# Patient Record
Sex: FEMALE | Race: WHITE | NOT HISPANIC OR LATINO
[De-identification: names, ages, dates, MRNs, and addresses within clinical notes are randomized per-mention and may not be internally consistent; named-entity substitution may affect disease eponyms.]

---

## 2017-01-04 ENCOUNTER — APPOINTMENT (OUTPATIENT)
Dept: PLASTIC SURGERY | Facility: CLINIC | Age: 67
End: 2017-01-04

## 2017-01-07 ENCOUNTER — EMERGENCY (EMERGENCY)
Facility: HOSPITAL | Age: 67
LOS: 1 days | Discharge: HOME | End: 2017-01-07
Attending: EMERGENCY MEDICINE | Admitting: EMERGENCY MEDICINE
Payer: MEDICARE

## 2017-01-07 VITALS
HEART RATE: 84 BPM | SYSTOLIC BLOOD PRESSURE: 144 MMHG | RESPIRATION RATE: 18 BRPM | DIASTOLIC BLOOD PRESSURE: 73 MMHG | TEMPERATURE: 98 F | OXYGEN SATURATION: 99 %

## 2017-01-07 VITALS — HEART RATE: 84 BPM | RESPIRATION RATE: 18 BRPM | SYSTOLIC BLOOD PRESSURE: 143 MMHG | DIASTOLIC BLOOD PRESSURE: 83 MMHG

## 2017-01-07 DIAGNOSIS — Z90.710 ACQUIRED ABSENCE OF BOTH CERVIX AND UTERUS: Chronic | ICD-10-CM

## 2017-01-07 DIAGNOSIS — Z90.49 ACQUIRED ABSENCE OF OTHER SPECIFIED PARTS OF DIGESTIVE TRACT: Chronic | ICD-10-CM

## 2017-01-07 DIAGNOSIS — Z93.3 COLOSTOMY STATUS: Chronic | ICD-10-CM

## 2017-01-07 LAB
ALBUMIN SERPL ELPH-MCNC: 3.7 G/DL — SIGNIFICANT CHANGE UP (ref 3.3–5)
ALP SERPL-CCNC: 78 U/L — SIGNIFICANT CHANGE UP (ref 40–120)
ALT FLD-CCNC: 20 U/L — SIGNIFICANT CHANGE UP (ref 4–33)
APTT BLD: 41.7 SEC — HIGH (ref 27.5–37.4)
AST SERPL-CCNC: 24 U/L — SIGNIFICANT CHANGE UP (ref 4–32)
BASE EXCESS BLDV CALC-SCNC: 3.8 MMOL/L — SIGNIFICANT CHANGE UP
BASOPHILS # BLD AUTO: 0.02 K/UL — SIGNIFICANT CHANGE UP (ref 0–0.2)
BASOPHILS NFR BLD AUTO: 0.2 % — SIGNIFICANT CHANGE UP (ref 0–2)
BILIRUB SERPL-MCNC: 0.3 MG/DL — SIGNIFICANT CHANGE UP (ref 0.2–1.2)
BLD GP AB SCN SERPL QL: NEGATIVE — SIGNIFICANT CHANGE UP
BLOOD GAS VENOUS - CREATININE: 1.11 MG/DL — SIGNIFICANT CHANGE UP (ref 0.5–1.3)
BUN SERPL-MCNC: 25 MG/DL — HIGH (ref 7–23)
CALCIUM SERPL-MCNC: 9 MG/DL — SIGNIFICANT CHANGE UP (ref 8.4–10.5)
CHLORIDE BLDV-SCNC: 99 MMOL/L — SIGNIFICANT CHANGE UP (ref 96–108)
CHLORIDE SERPL-SCNC: 97 MMOL/L — LOW (ref 98–107)
CO2 SERPL-SCNC: 27 MMOL/L — SIGNIFICANT CHANGE UP (ref 22–31)
CREAT SERPL-MCNC: 1.03 MG/DL — SIGNIFICANT CHANGE UP (ref 0.5–1.3)
EOSINOPHIL # BLD AUTO: 0.25 K/UL — SIGNIFICANT CHANGE UP (ref 0–0.5)
EOSINOPHIL NFR BLD AUTO: 2.5 % — SIGNIFICANT CHANGE UP (ref 0–6)
GAS PNL BLDV: 131 MMOL/L — LOW (ref 136–146)
GLUCOSE BLDV-MCNC: 119 — HIGH (ref 70–99)
GLUCOSE SERPL-MCNC: 123 MG/DL — HIGH (ref 70–99)
HCO3 BLDV-SCNC: 27 MMOL/L — SIGNIFICANT CHANGE UP (ref 20–27)
HCT VFR BLD CALC: 28.8 % — LOW (ref 34.5–45)
HCT VFR BLDV CALC: 30.5 % — LOW (ref 34.5–45)
HGB BLD-MCNC: 9.6 G/DL — LOW (ref 11.5–15.5)
HGB BLDV-MCNC: 9.9 G/DL — LOW (ref 11.5–15.5)
IMM GRANULOCYTES NFR BLD AUTO: 0.5 % — SIGNIFICANT CHANGE UP (ref 0–1.5)
INR BLD: 2.43 — HIGH (ref 0.87–1.18)
LACTATE BLDV-MCNC: 1.2 MMOL/L — SIGNIFICANT CHANGE UP (ref 0.5–2)
LYMPHOCYTES # BLD AUTO: 1.08 K/UL — SIGNIFICANT CHANGE UP (ref 1–3.3)
LYMPHOCYTES # BLD AUTO: 11 % — LOW (ref 13–44)
MCHC RBC-ENTMCNC: 33 PG — SIGNIFICANT CHANGE UP (ref 27–34)
MCHC RBC-ENTMCNC: 33.3 % — SIGNIFICANT CHANGE UP (ref 32–36)
MCV RBC AUTO: 99 FL — SIGNIFICANT CHANGE UP (ref 80–100)
MONOCYTES # BLD AUTO: 0.83 K/UL — SIGNIFICANT CHANGE UP (ref 0–0.9)
MONOCYTES NFR BLD AUTO: 8.5 % — SIGNIFICANT CHANGE UP (ref 2–14)
NEUTROPHILS # BLD AUTO: 7.59 K/UL — HIGH (ref 1.8–7.4)
NEUTROPHILS NFR BLD AUTO: 77.3 % — HIGH (ref 43–77)
PCO2 BLDV: 53 MMHG — HIGH (ref 41–51)
PH BLDV: 7.36 PH — SIGNIFICANT CHANGE UP (ref 7.32–7.43)
PLATELET # BLD AUTO: 164 K/UL — SIGNIFICANT CHANGE UP (ref 150–400)
PMV BLD: 9.1 FL — SIGNIFICANT CHANGE UP (ref 7–13)
PO2 BLDV: 46 MMHG — HIGH (ref 35–40)
POTASSIUM BLDV-SCNC: 3.8 MMOL/L — SIGNIFICANT CHANGE UP (ref 3.4–4.5)
POTASSIUM SERPL-MCNC: 4.1 MMOL/L — SIGNIFICANT CHANGE UP (ref 3.5–5.3)
POTASSIUM SERPL-SCNC: 4.1 MMOL/L — SIGNIFICANT CHANGE UP (ref 3.5–5.3)
PROT SERPL-MCNC: 7.1 G/DL — SIGNIFICANT CHANGE UP (ref 6–8.3)
PROTHROM AB SERPL-ACNC: 28 SEC — HIGH (ref 10–13.1)
RBC # BLD: 2.91 M/UL — LOW (ref 3.8–5.2)
RBC # FLD: 12.8 % — SIGNIFICANT CHANGE UP (ref 10.3–14.5)
RH IG SCN BLD-IMP: POSITIVE — SIGNIFICANT CHANGE UP
SAO2 % BLDV: 75.9 % — SIGNIFICANT CHANGE UP (ref 60–85)
SODIUM SERPL-SCNC: 138 MMOL/L — SIGNIFICANT CHANGE UP (ref 135–145)
WBC # BLD: 9.82 K/UL — SIGNIFICANT CHANGE UP (ref 3.8–10.5)
WBC # FLD AUTO: 9.82 K/UL — SIGNIFICANT CHANGE UP (ref 3.8–10.5)

## 2017-01-07 PROCEDURE — 99284 EMERGENCY DEPT VISIT MOD MDM: CPT | Mod: GC

## 2017-01-07 PROCEDURE — 71010: CPT | Mod: 26

## 2017-01-07 RX ORDER — SODIUM CHLORIDE 9 MG/ML
1000 INJECTION INTRAMUSCULAR; INTRAVENOUS; SUBCUTANEOUS ONCE
Qty: 0 | Refills: 0 | Status: COMPLETED | OUTPATIENT
Start: 2017-01-07 | End: 2017-01-07

## 2017-01-07 RX ORDER — PIPERACILLIN AND TAZOBACTAM 4; .5 G/20ML; G/20ML
3.38 INJECTION, POWDER, LYOPHILIZED, FOR SOLUTION INTRAVENOUS ONCE
Qty: 0 | Refills: 0 | Status: COMPLETED | OUTPATIENT
Start: 2017-01-07 | End: 2017-01-07

## 2017-01-07 RX ADMIN — SODIUM CHLORIDE 1000 MILLILITER(S): 9 INJECTION INTRAMUSCULAR; INTRAVENOUS; SUBCUTANEOUS at 10:41

## 2017-01-07 RX ADMIN — PIPERACILLIN AND TAZOBACTAM 200 GRAM(S): 4; .5 INJECTION, POWDER, LYOPHILIZED, FOR SOLUTION INTRAVENOUS at 10:41

## 2017-01-07 NOTE — ED PROVIDER NOTE - PROGRESS NOTE DETAILS
JW Discussed with surgery who read the CT from OSH with radiology and believe the fistula is chronically inflamed with no collection or intraperitoneal perforation.  Recommended placement of an ostomy bag over wound, continuation with amoxicillin and discharge with follow up next week. HUBERT Per surgery recommendation ostomy bag placed.  Pt instructed to continue amoxicillin.  Discussed  with Dr. Zhou who will see patient on Monday.  I reviewed the alarm symptoms of this patient's diagnosis and discussed criteria for their return to the emergency department.  I instructed the patient to return to the emergency department with any alarm symptoms for their specific diagnosis including nausea, vomiting, abdominal pain, fevers, chills, sweats, any worsening symptoms, and any other concerns.  I instructed this patient to call their primary doctor today, to inform them of their visit to the emergency department, and to obtain a repeat evaluation in the next 24 hours.  This patient understood and agreed with our plan for follow up and felt safe returning home.  At the time of discharge this patient remained in stable condition, in no acute distress, with stable vital signs.

## 2017-01-07 NOTE — ED PROVIDER NOTE - ATTENDING CONTRIBUTION TO CARE
Attending note:   After face to face evaluation of this patient, I concur with above noted hx, pe, and care plan for this patient. 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    Surgery contacted.

## 2017-01-07 NOTE — ED PROVIDER NOTE - PMH
Essential hypertension, hypertension with unspecified goal    Malignant neoplasm of ovary, unspecified laterality    Other acute pulmonary embolism    Ovarian cancer

## 2017-01-07 NOTE — ED ADULT TRIAGE NOTE - CHIEF COMPLAINT QUOTE
Sent by MD for right side ABD pain CT of ABD yest. = Fistula rupture. and  needs IV antibx + fluids.    Hx. left side colostomy, right side chest med. port.  fistula repaired 6/16/

## 2017-01-07 NOTE — ED PROVIDER NOTE - MEDICAL DECISION MAKING DETAILS
66 YOF PMH ovarian neoplasm NOS s/p debulking and total hysterectomy, sigmoid perforation s/p Escalona's Procedure (Dr. 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  VSS WNL.  Exam reveals draining fistula.  CT from OSH reveals no sign of peritoneal perforation.  Metabolic and hematologic evaluation, Pre-op labs, IVF, BCX, Zosyn.  Surgery consult.  Likely admit pending disposition from SX.  Treat symptomatically and reassess.

## 2017-01-07 NOTE — ED ADULT NURSE REASSESSMENT NOTE - NS ED NURSE REASSESS COMMENT FT1
iv started in lt ac/ pt evaluated by resident and attending pt has small opening in lower abd area fom old abcess small to moderate serous drainage noted. iv started and labs sent/ pt comfortable

## 2017-01-07 NOTE — ED PROVIDER NOTE - OBJECTIVE STATEMENT
66 YOF PMH sigmoid perforation s/p Escalona's Procedure(Dr. Zhou), HTN, ovarian neoplasm NOS s/p hysterectomy, PE no AC, sent by PMD for fistula perforation on CT p/w 66 YOF PM ovarian neoplasm NOS s/p debulking and total hysterectomy, sigmoid perforation s/p Escalona's Procedure (Dr. 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  Associated pain and tenderness.  Symptoms began two days ago continued to worsening.  Pt noted serosanguinous drainage called her NP and coordinated care with Dr. Zhou.  Pt received Ct showed new cutaneous perforation of fistula (read and CD on hand done at OSH).  Pt spoke with Dr. Zhou/NP and they sent her to the ED today.  PT has been on amoxicillin for two days.  No fevers, chills, sweats, weight loss, fatigue, or malaise. No abdominal pain, nausea, vomiting, bilious emesis, hematemesis, melena, hematochezia, bloody bowel movements, or rectal pain.

## 2017-01-07 NOTE — ED PROVIDER NOTE - ABDOMINAL EXAM
Midline abdominal surgical scar healed by secondary intention.  Colostomy present.  RLQ enteric fistula with serosanguinous drainage and induration present.  Mild cellulitis.  No crepitus or gas./soft

## 2017-01-08 LAB
SPECIMEN SOURCE: SIGNIFICANT CHANGE UP
SPECIMEN SOURCE: SIGNIFICANT CHANGE UP

## 2017-01-09 ENCOUNTER — APPOINTMENT (OUTPATIENT)
Dept: SURGERY | Facility: CLINIC | Age: 67
End: 2017-01-09

## 2017-01-09 VITALS
DIASTOLIC BLOOD PRESSURE: 81 MMHG | TEMPERATURE: 98 F | WEIGHT: 113 LBS | HEART RATE: 60 BPM | BODY MASS INDEX: 19.29 KG/M2 | HEIGHT: 64 IN | SYSTOLIC BLOOD PRESSURE: 132 MMHG

## 2017-01-12 LAB
BACTERIA BLD CULT: SIGNIFICANT CHANGE UP
BACTERIA BLD CULT: SIGNIFICANT CHANGE UP

## 2017-02-06 ENCOUNTER — APPOINTMENT (OUTPATIENT)
Dept: SURGERY | Facility: CLINIC | Age: 67
End: 2017-02-06

## 2017-02-06 VITALS
DIASTOLIC BLOOD PRESSURE: 76 MMHG | HEART RATE: 70 BPM | SYSTOLIC BLOOD PRESSURE: 122 MMHG | TEMPERATURE: 97.9 F | BODY MASS INDEX: 19.29 KG/M2 | HEIGHT: 64 IN | WEIGHT: 113 LBS

## 2017-03-27 ENCOUNTER — APPOINTMENT (OUTPATIENT)
Dept: GYNECOLOGIC ONCOLOGY | Facility: CLINIC | Age: 67
End: 2017-03-27

## 2017-03-27 VITALS
HEIGHT: 64 IN | DIASTOLIC BLOOD PRESSURE: 60 MMHG | SYSTOLIC BLOOD PRESSURE: 100 MMHG | WEIGHT: 115 LBS | BODY MASS INDEX: 19.63 KG/M2

## 2017-03-27 DIAGNOSIS — Z86.79 PERSONAL HISTORY OF OTHER DISEASES OF THE CIRCULATORY SYSTEM: ICD-10-CM

## 2017-04-10 ENCOUNTER — APPOINTMENT (OUTPATIENT)
Dept: SURGERY | Facility: CLINIC | Age: 67
End: 2017-04-10

## 2017-04-10 ENCOUNTER — OUTPATIENT (OUTPATIENT)
Dept: OUTPATIENT SERVICES | Facility: HOSPITAL | Age: 67
LOS: 1 days | End: 2017-04-10
Payer: MEDICARE

## 2017-04-10 VITALS
HEART RATE: 61 BPM | BODY MASS INDEX: 19.63 KG/M2 | WEIGHT: 115 LBS | SYSTOLIC BLOOD PRESSURE: 134 MMHG | DIASTOLIC BLOOD PRESSURE: 72 MMHG | HEIGHT: 64 IN

## 2017-04-10 VITALS
DIASTOLIC BLOOD PRESSURE: 80 MMHG | HEIGHT: 62.5 IN | HEART RATE: 55 BPM | WEIGHT: 119.05 LBS | RESPIRATION RATE: 14 BRPM | OXYGEN SATURATION: 100 % | SYSTOLIC BLOOD PRESSURE: 140 MMHG | TEMPERATURE: 98 F

## 2017-04-10 DIAGNOSIS — K63.2 FISTULA OF INTESTINE: ICD-10-CM

## 2017-04-10 DIAGNOSIS — Z90.710 ACQUIRED ABSENCE OF BOTH CERVIX AND UTERUS: Chronic | ICD-10-CM

## 2017-04-10 DIAGNOSIS — G47.33 OBSTRUCTIVE SLEEP APNEA (ADULT) (PEDIATRIC): ICD-10-CM

## 2017-04-10 DIAGNOSIS — I26.99 OTHER PULMONARY EMBOLISM WITHOUT ACUTE COR PULMONALE: ICD-10-CM

## 2017-04-10 DIAGNOSIS — Z93.3 COLOSTOMY STATUS: Chronic | ICD-10-CM

## 2017-04-10 DIAGNOSIS — Z90.49 ACQUIRED ABSENCE OF OTHER SPECIFIED PARTS OF DIGESTIVE TRACT: Chronic | ICD-10-CM

## 2017-04-10 DIAGNOSIS — I10 ESSENTIAL (PRIMARY) HYPERTENSION: ICD-10-CM

## 2017-04-10 DIAGNOSIS — Z98.890 OTHER SPECIFIED POSTPROCEDURAL STATES: Chronic | ICD-10-CM

## 2017-04-10 LAB
ALBUMIN SERPL ELPH-MCNC: 4.4 G/DL — SIGNIFICANT CHANGE UP (ref 3.3–5)
ALP SERPL-CCNC: 60 U/L — SIGNIFICANT CHANGE UP (ref 40–120)
ALT FLD-CCNC: 38 U/L — HIGH (ref 4–33)
AST SERPL-CCNC: 41 U/L — HIGH (ref 4–32)
BILIRUB SERPL-MCNC: 0.3 MG/DL — SIGNIFICANT CHANGE UP (ref 0.2–1.2)
BLD GP AB SCN SERPL QL: NEGATIVE — SIGNIFICANT CHANGE UP
BUN SERPL-MCNC: 33 MG/DL — HIGH (ref 7–23)
CALCIUM SERPL-MCNC: 9.7 MG/DL — SIGNIFICANT CHANGE UP (ref 8.4–10.5)
CHLORIDE SERPL-SCNC: 102 MMOL/L — SIGNIFICANT CHANGE UP (ref 98–107)
CO2 SERPL-SCNC: 26 MMOL/L — SIGNIFICANT CHANGE UP (ref 22–31)
CREAT SERPL-MCNC: 0.97 MG/DL — SIGNIFICANT CHANGE UP (ref 0.5–1.3)
GLUCOSE SERPL-MCNC: 80 MG/DL — SIGNIFICANT CHANGE UP (ref 70–99)
HCT VFR BLD CALC: 33.7 % — LOW (ref 34.5–45)
HGB BLD-MCNC: 10.8 G/DL — LOW (ref 11.5–15.5)
MCHC RBC-ENTMCNC: 32 % — SIGNIFICANT CHANGE UP (ref 32–36)
MCHC RBC-ENTMCNC: 32.4 PG — SIGNIFICANT CHANGE UP (ref 27–34)
MCV RBC AUTO: 101.2 FL — HIGH (ref 80–100)
PLATELET # BLD AUTO: 136 K/UL — LOW (ref 150–400)
PMV BLD: 9.5 FL — SIGNIFICANT CHANGE UP (ref 7–13)
POTASSIUM SERPL-MCNC: 4.8 MMOL/L — SIGNIFICANT CHANGE UP (ref 3.5–5.3)
POTASSIUM SERPL-SCNC: 4.8 MMOL/L — SIGNIFICANT CHANGE UP (ref 3.5–5.3)
PROT SERPL-MCNC: 7.7 G/DL — SIGNIFICANT CHANGE UP (ref 6–8.3)
RBC # BLD: 3.33 M/UL — LOW (ref 3.8–5.2)
RBC # FLD: 13.6 % — SIGNIFICANT CHANGE UP (ref 10.3–14.5)
RH IG SCN BLD-IMP: POSITIVE — SIGNIFICANT CHANGE UP
SODIUM SERPL-SCNC: 142 MMOL/L — SIGNIFICANT CHANGE UP (ref 135–145)
WBC # BLD: 7.01 K/UL — SIGNIFICANT CHANGE UP (ref 3.8–10.5)
WBC # FLD AUTO: 7.01 K/UL — SIGNIFICANT CHANGE UP (ref 3.8–10.5)

## 2017-04-10 PROCEDURE — 93010 ELECTROCARDIOGRAM REPORT: CPT

## 2017-04-10 RX ORDER — APIXABAN 2.5 MG/1
0 TABLET, FILM COATED ORAL
Qty: 0 | Refills: 0 | COMMUNITY

## 2017-04-10 RX ORDER — SODIUM CHLORIDE 9 MG/ML
1000 INJECTION, SOLUTION INTRAVENOUS
Qty: 0 | Refills: 0 | Status: DISCONTINUED | OUTPATIENT
Start: 2017-04-25 | End: 2017-04-25

## 2017-04-10 RX ORDER — CARVEDILOL 6.25 MG/1
6.25 TABLET, FILM COATED ORAL TWICE DAILY
Refills: 0 | Status: ACTIVE | COMMUNITY

## 2017-04-10 NOTE — H&P PST ADULT - NSANTHOSAYNRD_GEN_A_CORE
No. JENNIFFER screening performed.  STOP BANG Legend: 0-2 = LOW Risk; 3-4 = INTERMEDIATE Risk; 5-8 = HIGH Risk

## 2017-04-10 NOTE — H&P PST ADULT - PROBLEM SELECTOR PLAN 3
hx of PE, on Xeralto, manage by oncologist. Message left with Yolanda BAZAN for Xeralto instruction. hx of PE, on Xarelto, manage by Dr. Nunez (oncologist). Patient was instructed to stop Xarelto 5 days prior to surgery (last dose 4/20), and administer Lovenox injection 80 mg daily x 3 days (4/21,4/22,4/23). Will obtain written instruction from oncologist for PST chart. Patient verbalized understanding. hx of PE, on Xarelto, manage by Dr. Nunez (oncologist). Dr. Nunez's office was called & spoke with Yolanda BAZAN. Patient will stop Xarelto 5 days prior to surgery (last dose 4/20), and administer Lovenox injection 80 mg daily x 3 days (4/21,4/22,4/23). Patient verbalized understanding. Will obtain written instruction from oncologist for PST chart.  Dr. Zhou was notified about the instruction.

## 2017-04-10 NOTE — H&P PST ADULT - NEGATIVE GENERAL GENITOURINARY SYMPTOMS
no urinary hesitancy/no hematuria/no bladder infections/no flank pain L/no flank pain R/normal urinary frequency

## 2017-04-10 NOTE — H&P PST ADULT - RS GEN PE MLT RESP DETAILS PC
no rales/airway patent/good air movement/respirations non-labored/clear to auscultation bilaterally/no rhonchi/no wheezes/breath sounds equal

## 2017-04-10 NOTE — H&P PST ADULT - HISTORY OF PRESENT ILLNESS
This is a 66 year old female, who was transferred from [facility] on 6/1/2016. The patient has a complicated gynecologic and surgical history. She has ovarian cancer and underwent a tumor debulking procedure at [facility]. She was subsequently discharged to home after this procedure. However, she developed peritonitis and underwent an exploratory laparotomy at [facility], where she was found to have a sigmoid perforation; therefore an emergent Reymundo procedure was performed (by Dr. Zhou). The patient underwent one course of chemotherapy at [facility] on May 27, 2016. She was scheduled to have an Infusaport placed at [facility] on the morning of 6/1/2016. However on the morning of 6/1/2016, while still at home, the patient had eruption of liquid stool through an old drain site in the right lower quadrant of her abdomen. The patient presented to [facility] ED, where she continued to drain liquid stool through the wound and that area became exquisitely tender. A CT scan performed there showed a large pelvic abscess close to the stapled rectal stump. The patient was subsequently transferred to [facility] for further management.   68 yo female with hx of ovarian cancer s/p tumor debulking exploratory  laparotomy (2016) 66 yo female with hx of ovarian cancer s/p tumor debulking (4/2016) & chemotherapy (5/2016), s/p Reymundo procedure for sigmoid perforation(4/2016), s/p skin graft to abdominal wound (11/2016) presents to have PST evaluation for closure of colostomy for enterocutaneous fistula on 4/25/2017. Patient reports, hx of PE, is currently on Xarelto.

## 2017-04-10 NOTE — H&P PST ADULT - ALLERGY TYPES
outdoor environmental allergies/reactions to food/reactions to medicines/indoor environmental allergies

## 2017-04-10 NOTE — H&P PST ADULT - GASTROINTESTINAL COMMENTS
Midline wound vac holding suction ostomy preop dx of fistula of intestine LLQ colostomy, c/o intermittent drainage from right groin - evaluated by Dr. Zhou per pt.

## 2017-04-10 NOTE — H&P PST ADULT - PMH
Essential hypertension, hypertension with unspecified goal    Malignant neoplasm of ovary, unspecified laterality    Other acute pulmonary embolism    Ovarian cancer Essential hypertension, hypertension with unspecified goal    Malignant neoplasm of ovary, unspecified laterality    Other acute pulmonary embolism    Ovarian cancer    Perforation of sigmoid colon  s/p Reymundo procedure 4/2016

## 2017-04-10 NOTE — H&P PST ADULT - PSH
H/O abdominal hysterectomy    S/P cholecystectomy    Status post Reymundo procedure  April 2016 H/O abdominal hysterectomy    H/O skin graft  to abdominal wound (11/2016)  S/P cholecystectomy    Status post Reymundo procedure  April 2016

## 2017-04-10 NOTE — H&P PST ADULT - NEGATIVE ENMT SYMPTOMS
no post-nasal discharge/no nasal discharge/no nasal obstruction/no nasal congestion/no vertigo/no sinus symptoms/no ear pain/no hearing difficulty

## 2017-04-10 NOTE — H&P PST ADULT - PROBLEM SELECTOR PLAN 2
Instructed to take carvedilol AM of surgery with a sip of water. Pending medical clearance from Dr. Bernard. Will obtain last echo, stress tests report

## 2017-04-10 NOTE — H&P PST ADULT - NEGATIVE OPHTHALMOLOGIC SYMPTOMS
no blurred vision R/no photophobia/no lacrimation L/no lacrimation R/no diplopia/no blurred vision L

## 2017-04-10 NOTE — H&P PST ADULT - MUSCULOSKELETAL
details… No joint pain, swelling or deformity; no limitation of movement no calf tenderness/no joint swelling/normal strength detailed exam

## 2017-04-10 NOTE — H&P PST ADULT - NEGATIVE CARDIOVASCULAR SYMPTOMS
no peripheral edema/no palpitations/no chest pain/no dyspnea on exertion no dyspnea on exertion/no palpitations/no chest pain

## 2017-04-10 NOTE — H&P PST ADULT - PROBLEM SELECTOR PLAN 1
Scheduled for closure of colostomy for enterocutaneous fistula on 4/25/2017. labs done and results pending. Surgical scrub & preop instruction given and explained. Verbalized understanding.

## 2017-04-24 ENCOUNTER — RESULT REVIEW (OUTPATIENT)
Age: 67
End: 2017-04-24

## 2017-04-24 NOTE — ASU PATIENT PROFILE, ADULT - PMH
Essential hypertension, hypertension with unspecified goal    Malignant neoplasm of ovary, unspecified laterality    Other acute pulmonary embolism    Ovarian cancer    Perforation of sigmoid colon  s/p Reymundo procedure 4/2016

## 2017-04-24 NOTE — ASU PATIENT PROFILE, ADULT - PSH
H/O abdominal hysterectomy    H/O skin graft  to abdominal wound (11/2016)  S/P cholecystectomy    Status post Reymundo procedure  April 2016 H/O abdominal hysterectomy  removal of ovaries, tubes ,omentum,radical debulking  H/O skin graft  to abdominal wound (11/2016)  History of conization of cervix  1984  S/P cholecystectomy  2011  Status post Reymundo procedure  April 2016  Uterus disorder  removed in 2003, due to fibroids

## 2017-04-25 ENCOUNTER — INPATIENT (INPATIENT)
Facility: HOSPITAL | Age: 67
LOS: 3 days | Discharge: HOME HEALTH | End: 2017-04-29
Attending: SURGERY | Admitting: SURGERY
Payer: MEDICARE

## 2017-04-25 ENCOUNTER — APPOINTMENT (OUTPATIENT)
Dept: SURGERY | Facility: HOSPITAL | Age: 67
End: 2017-04-25

## 2017-04-25 VITALS
HEIGHT: 62.5 IN | RESPIRATION RATE: 16 BRPM | DIASTOLIC BLOOD PRESSURE: 71 MMHG | HEART RATE: 60 BPM | OXYGEN SATURATION: 100 % | TEMPERATURE: 98 F | SYSTOLIC BLOOD PRESSURE: 127 MMHG | WEIGHT: 119.05 LBS

## 2017-04-25 DIAGNOSIS — Z98.890 OTHER SPECIFIED POSTPROCEDURAL STATES: Chronic | ICD-10-CM

## 2017-04-25 DIAGNOSIS — N85.9 NONINFLAMMATORY DISORDER OF UTERUS, UNSPECIFIED: Chronic | ICD-10-CM

## 2017-04-25 DIAGNOSIS — K63.2 FISTULA OF INTESTINE: ICD-10-CM

## 2017-04-25 DIAGNOSIS — Z90.49 ACQUIRED ABSENCE OF OTHER SPECIFIED PARTS OF DIGESTIVE TRACT: Chronic | ICD-10-CM

## 2017-04-25 DIAGNOSIS — Z93.3 COLOSTOMY STATUS: Chronic | ICD-10-CM

## 2017-04-25 DIAGNOSIS — Z90.710 ACQUIRED ABSENCE OF BOTH CERVIX AND UTERUS: Chronic | ICD-10-CM

## 2017-04-25 LAB
BUN SERPL-MCNC: 11 MG/DL — SIGNIFICANT CHANGE UP (ref 7–23)
CALCIUM SERPL-MCNC: 9.6 MG/DL — SIGNIFICANT CHANGE UP (ref 8.4–10.5)
CHLORIDE SERPL-SCNC: 103 MMOL/L — SIGNIFICANT CHANGE UP (ref 98–107)
CO2 SERPL-SCNC: 24 MMOL/L — SIGNIFICANT CHANGE UP (ref 22–31)
CREAT SERPL-MCNC: 0.9 MG/DL — SIGNIFICANT CHANGE UP (ref 0.5–1.3)
GLUCOSE SERPL-MCNC: 150 MG/DL — HIGH (ref 70–99)
HCT VFR BLD CALC: 37.4 % — SIGNIFICANT CHANGE UP (ref 34.5–45)
HGB BLD-MCNC: 12.2 G/DL — SIGNIFICANT CHANGE UP (ref 11.5–15.5)
MCHC RBC-ENTMCNC: 32.6 % — SIGNIFICANT CHANGE UP (ref 32–36)
MCHC RBC-ENTMCNC: 33.2 PG — SIGNIFICANT CHANGE UP (ref 27–34)
MCV RBC AUTO: 101.6 FL — HIGH (ref 80–100)
PLATELET # BLD AUTO: 130 K/UL — LOW (ref 150–400)
PMV BLD: 9.1 FL — SIGNIFICANT CHANGE UP (ref 7–13)
POTASSIUM SERPL-MCNC: 4.2 MMOL/L — SIGNIFICANT CHANGE UP (ref 3.5–5.3)
POTASSIUM SERPL-SCNC: 4.2 MMOL/L — SIGNIFICANT CHANGE UP (ref 3.5–5.3)
RBC # BLD: 3.68 M/UL — LOW (ref 3.8–5.2)
RBC # FLD: 12.8 % — SIGNIFICANT CHANGE UP (ref 10.3–14.5)
SODIUM SERPL-SCNC: 140 MMOL/L — SIGNIFICANT CHANGE UP (ref 135–145)
WBC # BLD: 10.48 K/UL — SIGNIFICANT CHANGE UP (ref 3.8–10.5)
WBC # FLD AUTO: 10.48 K/UL — SIGNIFICANT CHANGE UP (ref 3.8–10.5)

## 2017-04-25 PROCEDURE — 88305 TISSUE EXAM BY PATHOLOGIST: CPT | Mod: 26

## 2017-04-25 PROCEDURE — 15734 MUSCLE-SKIN GRAFT TRUNK: CPT

## 2017-04-25 PROCEDURE — 49566: CPT

## 2017-04-25 PROCEDURE — 49560: CPT

## 2017-04-25 PROCEDURE — 88331 PATH CONSLTJ SURG 1 BLK 1SPC: CPT | Mod: 26

## 2017-04-25 RX ORDER — HYDROMORPHONE HYDROCHLORIDE 2 MG/ML
1 INJECTION INTRAMUSCULAR; INTRAVENOUS; SUBCUTANEOUS ONCE
Qty: 0 | Refills: 0 | Status: DISCONTINUED | OUTPATIENT
Start: 2017-04-25 | End: 2017-04-25

## 2017-04-25 RX ORDER — ENOXAPARIN SODIUM 100 MG/ML
30 INJECTION SUBCUTANEOUS EVERY 24 HOURS
Qty: 0 | Refills: 0 | Status: DISCONTINUED | OUTPATIENT
Start: 2017-04-25 | End: 2017-04-25

## 2017-04-25 RX ORDER — HYDROMORPHONE HYDROCHLORIDE 2 MG/ML
1 INJECTION INTRAMUSCULAR; INTRAVENOUS; SUBCUTANEOUS
Qty: 0 | Refills: 0 | Status: DISCONTINUED | OUTPATIENT
Start: 2017-04-25 | End: 2017-04-25

## 2017-04-25 RX ORDER — ZOLPIDEM TARTRATE 10 MG/1
5 TABLET ORAL AT BEDTIME
Qty: 0 | Refills: 0 | Status: DISCONTINUED | OUTPATIENT
Start: 2017-04-25 | End: 2017-04-29

## 2017-04-25 RX ORDER — METOCLOPRAMIDE HCL 10 MG
10 TABLET ORAL ONCE
Qty: 0 | Refills: 0 | Status: DISCONTINUED | OUTPATIENT
Start: 2017-04-25 | End: 2017-04-25

## 2017-04-25 RX ORDER — HYDRALAZINE HCL 50 MG
10 TABLET ORAL ONCE
Qty: 0 | Refills: 0 | Status: COMPLETED | OUTPATIENT
Start: 2017-04-25 | End: 2017-04-25

## 2017-04-25 RX ORDER — ONDANSETRON 8 MG/1
4 TABLET, FILM COATED ORAL EVERY 6 HOURS
Qty: 0 | Refills: 0 | Status: DISCONTINUED | OUTPATIENT
Start: 2017-04-25 | End: 2017-04-26

## 2017-04-25 RX ORDER — HYDROMORPHONE HYDROCHLORIDE 2 MG/ML
30 INJECTION INTRAMUSCULAR; INTRAVENOUS; SUBCUTANEOUS
Qty: 0 | Refills: 0 | Status: DISCONTINUED | OUTPATIENT
Start: 2017-04-25 | End: 2017-04-27

## 2017-04-25 RX ORDER — HYDROMORPHONE HYDROCHLORIDE 2 MG/ML
0.5 INJECTION INTRAMUSCULAR; INTRAVENOUS; SUBCUTANEOUS
Qty: 0 | Refills: 0 | Status: DISCONTINUED | OUTPATIENT
Start: 2017-04-25 | End: 2017-04-25

## 2017-04-25 RX ORDER — SODIUM CHLORIDE 9 MG/ML
1000 INJECTION, SOLUTION INTRAVENOUS
Qty: 0 | Refills: 0 | Status: DISCONTINUED | OUTPATIENT
Start: 2017-04-25 | End: 2017-04-26

## 2017-04-25 RX ORDER — MIDAZOLAM HYDROCHLORIDE 1 MG/ML
1 INJECTION, SOLUTION INTRAMUSCULAR; INTRAVENOUS ONCE
Qty: 0 | Refills: 0 | Status: DISCONTINUED | OUTPATIENT
Start: 2017-04-25 | End: 2017-04-25

## 2017-04-25 RX ORDER — METOPROLOL TARTRATE 50 MG
5 TABLET ORAL EVERY 6 HOURS
Qty: 0 | Refills: 0 | Status: DISCONTINUED | OUTPATIENT
Start: 2017-04-25 | End: 2017-04-26

## 2017-04-25 RX ORDER — PANTOPRAZOLE SODIUM 20 MG/1
40 TABLET, DELAYED RELEASE ORAL DAILY
Qty: 0 | Refills: 0 | Status: DISCONTINUED | OUTPATIENT
Start: 2017-04-25 | End: 2017-04-26

## 2017-04-25 RX ORDER — NALOXONE HYDROCHLORIDE 4 MG/.1ML
0.1 SPRAY NASAL
Qty: 0 | Refills: 0 | Status: DISCONTINUED | OUTPATIENT
Start: 2017-04-25 | End: 2017-04-27

## 2017-04-25 RX ORDER — ENOXAPARIN SODIUM 100 MG/ML
40 INJECTION SUBCUTANEOUS DAILY
Qty: 0 | Refills: 0 | Status: DISCONTINUED | OUTPATIENT
Start: 2017-04-25 | End: 2017-04-26

## 2017-04-25 RX ADMIN — Medication 5 MILLIGRAM(S): at 18:53

## 2017-04-25 RX ADMIN — MIDAZOLAM HYDROCHLORIDE 1 MILLIGRAM(S): 1 INJECTION, SOLUTION INTRAMUSCULAR; INTRAVENOUS at 18:36

## 2017-04-25 RX ADMIN — Medication 10 MILLIGRAM(S): at 17:00

## 2017-04-25 RX ADMIN — HYDROMORPHONE HYDROCHLORIDE 0.5 MILLIGRAM(S): 2 INJECTION INTRAMUSCULAR; INTRAVENOUS; SUBCUTANEOUS at 16:20

## 2017-04-25 RX ADMIN — HYDROMORPHONE HYDROCHLORIDE 30 MILLILITER(S): 2 INJECTION INTRAMUSCULAR; INTRAVENOUS; SUBCUTANEOUS at 17:28

## 2017-04-25 RX ADMIN — HYDROMORPHONE HYDROCHLORIDE 0.5 MILLIGRAM(S): 2 INJECTION INTRAMUSCULAR; INTRAVENOUS; SUBCUTANEOUS at 16:30

## 2017-04-25 RX ADMIN — HYDROMORPHONE HYDROCHLORIDE 30 MILLILITER(S): 2 INJECTION INTRAMUSCULAR; INTRAVENOUS; SUBCUTANEOUS at 19:14

## 2017-04-25 RX ADMIN — HYDROMORPHONE HYDROCHLORIDE 30 MILLILITER(S): 2 INJECTION INTRAMUSCULAR; INTRAVENOUS; SUBCUTANEOUS at 23:34

## 2017-04-25 RX ADMIN — HYDROMORPHONE HYDROCHLORIDE 1 MILLIGRAM(S): 2 INJECTION INTRAMUSCULAR; INTRAVENOUS; SUBCUTANEOUS at 17:00

## 2017-04-25 RX ADMIN — SODIUM CHLORIDE 100 MILLILITER(S): 9 INJECTION, SOLUTION INTRAVENOUS at 16:17

## 2017-04-25 RX ADMIN — Medication 5 MILLIGRAM(S): at 23:08

## 2017-04-25 RX ADMIN — HYDROMORPHONE HYDROCHLORIDE 1 MILLIGRAM(S): 2 INJECTION INTRAMUSCULAR; INTRAVENOUS; SUBCUTANEOUS at 17:50

## 2017-04-25 RX ADMIN — HYDROMORPHONE HYDROCHLORIDE 1 MILLIGRAM(S): 2 INJECTION INTRAMUSCULAR; INTRAVENOUS; SUBCUTANEOUS at 18:00

## 2017-04-25 RX ADMIN — SODIUM CHLORIDE 100 MILLILITER(S): 9 INJECTION, SOLUTION INTRAVENOUS at 23:08

## 2017-04-25 RX ADMIN — ZOLPIDEM TARTRATE 5 MILLIGRAM(S): 10 TABLET ORAL at 23:33

## 2017-04-25 RX ADMIN — HYDROMORPHONE HYDROCHLORIDE 0.5 MILLIGRAM(S): 2 INJECTION INTRAMUSCULAR; INTRAVENOUS; SUBCUTANEOUS at 16:05

## 2017-04-25 RX ADMIN — HYDROMORPHONE HYDROCHLORIDE 30 MILLILITER(S): 2 INJECTION INTRAMUSCULAR; INTRAVENOUS; SUBCUTANEOUS at 23:07

## 2017-04-25 RX ADMIN — MIDAZOLAM HYDROCHLORIDE 1 MILLIGRAM(S): 1 INJECTION, SOLUTION INTRAMUSCULAR; INTRAVENOUS at 18:54

## 2017-04-25 RX ADMIN — HYDROMORPHONE HYDROCHLORIDE 1 MILLIGRAM(S): 2 INJECTION INTRAMUSCULAR; INTRAVENOUS; SUBCUTANEOUS at 16:45

## 2017-04-25 NOTE — BRIEF OPERATIVE NOTE - OPERATION/FINDINGS
exploratory laparotomy, lysis of adhesions, repair of ventral hernia with mesh.    Upon entering the abdomen peritoneal implants were identified. The decision was made to abort closure of the colostomy and the ventral hernia defect was repaired.

## 2017-04-26 ENCOUNTER — TRANSCRIPTION ENCOUNTER (OUTPATIENT)
Age: 67
End: 2017-04-26

## 2017-04-26 LAB
BUN SERPL-MCNC: 12 MG/DL — SIGNIFICANT CHANGE UP (ref 7–23)
CALCIUM SERPL-MCNC: 9.1 MG/DL — SIGNIFICANT CHANGE UP (ref 8.4–10.5)
CHLORIDE SERPL-SCNC: 101 MMOL/L — SIGNIFICANT CHANGE UP (ref 98–107)
CO2 SERPL-SCNC: 25 MMOL/L — SIGNIFICANT CHANGE UP (ref 22–31)
CREAT SERPL-MCNC: 0.85 MG/DL — SIGNIFICANT CHANGE UP (ref 0.5–1.3)
GLUCOSE SERPL-MCNC: 112 MG/DL — HIGH (ref 70–99)
HCT VFR BLD CALC: 35.9 % — SIGNIFICANT CHANGE UP (ref 34.5–45)
HGB BLD-MCNC: 11.6 G/DL — SIGNIFICANT CHANGE UP (ref 11.5–15.5)
MAGNESIUM SERPL-MCNC: 1 MG/DL — CRITICAL LOW (ref 1.6–2.6)
MCHC RBC-ENTMCNC: 32.3 % — SIGNIFICANT CHANGE UP (ref 32–36)
MCHC RBC-ENTMCNC: 33.1 PG — SIGNIFICANT CHANGE UP (ref 27–34)
MCV RBC AUTO: 102.6 FL — HIGH (ref 80–100)
PHOSPHATE SERPL-MCNC: 4.4 MG/DL — SIGNIFICANT CHANGE UP (ref 2.5–4.5)
PLATELET # BLD AUTO: 151 K/UL — SIGNIFICANT CHANGE UP (ref 150–400)
PMV BLD: 9.1 FL — SIGNIFICANT CHANGE UP (ref 7–13)
POTASSIUM SERPL-MCNC: 4.2 MMOL/L — SIGNIFICANT CHANGE UP (ref 3.5–5.3)
POTASSIUM SERPL-SCNC: 4.2 MMOL/L — SIGNIFICANT CHANGE UP (ref 3.5–5.3)
RBC # BLD: 3.5 M/UL — LOW (ref 3.8–5.2)
RBC # FLD: 13 % — SIGNIFICANT CHANGE UP (ref 10.3–14.5)
SODIUM SERPL-SCNC: 139 MMOL/L — SIGNIFICANT CHANGE UP (ref 135–145)
WBC # BLD: 10.65 K/UL — HIGH (ref 3.8–10.5)
WBC # FLD AUTO: 10.65 K/UL — HIGH (ref 3.8–10.5)

## 2017-04-26 RX ORDER — CARVEDILOL PHOSPHATE 80 MG/1
6.25 CAPSULE, EXTENDED RELEASE ORAL EVERY 12 HOURS
Qty: 0 | Refills: 0 | Status: DISCONTINUED | OUTPATIENT
Start: 2017-04-26 | End: 2017-04-29

## 2017-04-26 RX ORDER — PANTOPRAZOLE SODIUM 20 MG/1
40 TABLET, DELAYED RELEASE ORAL
Qty: 0 | Refills: 0 | Status: DISCONTINUED | OUTPATIENT
Start: 2017-04-26 | End: 2017-04-29

## 2017-04-26 RX ORDER — CLONAZEPAM 1 MG
0.5 TABLET ORAL
Qty: 0 | Refills: 0 | Status: DISCONTINUED | OUTPATIENT
Start: 2017-04-26 | End: 2017-04-26

## 2017-04-26 RX ORDER — MAGNESIUM SULFATE 500 MG/ML
2 VIAL (ML) INJECTION
Qty: 0 | Refills: 0 | Status: COMPLETED | OUTPATIENT
Start: 2017-04-26 | End: 2017-04-26

## 2017-04-26 RX ORDER — OXYCODONE HYDROCHLORIDE 5 MG/1
20 TABLET ORAL ONCE
Qty: 0 | Refills: 0 | Status: DISCONTINUED | OUTPATIENT
Start: 2017-04-26 | End: 2017-04-26

## 2017-04-26 RX ORDER — RIVAROXABAN 15 MG-20MG
20 KIT ORAL DAILY
Qty: 0 | Refills: 0 | Status: DISCONTINUED | OUTPATIENT
Start: 2017-04-26 | End: 2017-04-29

## 2017-04-26 RX ORDER — CLONAZEPAM 1 MG
0.5 TABLET ORAL
Qty: 0 | Refills: 0 | Status: DISCONTINUED | OUTPATIENT
Start: 2017-04-26 | End: 2017-04-29

## 2017-04-26 RX ORDER — DEXTROSE MONOHYDRATE, SODIUM CHLORIDE, AND POTASSIUM CHLORIDE 50; .745; 4.5 G/1000ML; G/1000ML; G/1000ML
1000 INJECTION, SOLUTION INTRAVENOUS
Qty: 0 | Refills: 0 | Status: DISCONTINUED | OUTPATIENT
Start: 2017-04-26 | End: 2017-04-27

## 2017-04-26 RX ORDER — SODIUM CHLORIDE 9 MG/ML
1000 INJECTION, SOLUTION INTRAVENOUS
Qty: 0 | Refills: 0 | Status: DISCONTINUED | OUTPATIENT
Start: 2017-04-26 | End: 2017-04-26

## 2017-04-26 RX ORDER — ACETAMINOPHEN 500 MG
650 TABLET ORAL ONCE
Qty: 0 | Refills: 0 | Status: COMPLETED | OUTPATIENT
Start: 2017-04-26 | End: 2017-04-26

## 2017-04-26 RX ORDER — OXYCODONE HYDROCHLORIDE 5 MG/1
20 TABLET ORAL EVERY 12 HOURS
Qty: 0 | Refills: 0 | Status: DISCONTINUED | OUTPATIENT
Start: 2017-04-26 | End: 2017-04-29

## 2017-04-26 RX ORDER — SIMVASTATIN 20 MG/1
10 TABLET, FILM COATED ORAL AT BEDTIME
Qty: 0 | Refills: 0 | Status: DISCONTINUED | OUTPATIENT
Start: 2017-04-26 | End: 2017-04-29

## 2017-04-26 RX ADMIN — PANTOPRAZOLE SODIUM 40 MILLIGRAM(S): 20 TABLET, DELAYED RELEASE ORAL at 10:13

## 2017-04-26 RX ADMIN — Medication 650 MILLIGRAM(S): at 22:36

## 2017-04-26 RX ADMIN — CARVEDILOL PHOSPHATE 6.25 MILLIGRAM(S): 80 CAPSULE, EXTENDED RELEASE ORAL at 18:16

## 2017-04-26 RX ADMIN — Medication 50 GRAM(S): at 13:21

## 2017-04-26 RX ADMIN — OXYCODONE HYDROCHLORIDE 20 MILLIGRAM(S): 5 TABLET ORAL at 13:31

## 2017-04-26 RX ADMIN — SODIUM CHLORIDE 75 MILLILITER(S): 9 INJECTION, SOLUTION INTRAVENOUS at 10:15

## 2017-04-26 RX ADMIN — SIMVASTATIN 10 MILLIGRAM(S): 20 TABLET, FILM COATED ORAL at 22:37

## 2017-04-26 RX ADMIN — Medication 50 GRAM(S): at 10:13

## 2017-04-26 RX ADMIN — ZOLPIDEM TARTRATE 5 MILLIGRAM(S): 10 TABLET ORAL at 22:52

## 2017-04-26 RX ADMIN — Medication 650 MILLIGRAM(S): at 23:06

## 2017-04-26 RX ADMIN — HYDROMORPHONE HYDROCHLORIDE 30 MILLILITER(S): 2 INJECTION INTRAMUSCULAR; INTRAVENOUS; SUBCUTANEOUS at 20:29

## 2017-04-26 RX ADMIN — DEXTROSE MONOHYDRATE, SODIUM CHLORIDE, AND POTASSIUM CHLORIDE 30 MILLILITER(S): 50; .745; 4.5 INJECTION, SOLUTION INTRAVENOUS at 18:15

## 2017-04-26 RX ADMIN — OXYCODONE HYDROCHLORIDE 20 MILLIGRAM(S): 5 TABLET ORAL at 18:16

## 2017-04-26 RX ADMIN — RIVAROXABAN 20 MILLIGRAM(S): KIT at 12:48

## 2017-04-26 RX ADMIN — Medication 5 MILLIGRAM(S): at 06:00

## 2017-04-26 RX ADMIN — HYDROMORPHONE HYDROCHLORIDE 30 MILLILITER(S): 2 INJECTION INTRAMUSCULAR; INTRAVENOUS; SUBCUTANEOUS at 08:19

## 2017-04-26 RX ADMIN — ZOLPIDEM TARTRATE 5 MILLIGRAM(S): 10 TABLET ORAL at 01:11

## 2017-04-26 RX ADMIN — OXYCODONE HYDROCHLORIDE 20 MILLIGRAM(S): 5 TABLET ORAL at 12:47

## 2017-04-27 LAB
BUN SERPL-MCNC: 10 MG/DL — SIGNIFICANT CHANGE UP (ref 7–23)
CALCIUM SERPL-MCNC: 9.3 MG/DL — SIGNIFICANT CHANGE UP (ref 8.4–10.5)
CHLORIDE SERPL-SCNC: 100 MMOL/L — SIGNIFICANT CHANGE UP (ref 98–107)
CO2 SERPL-SCNC: 26 MMOL/L — SIGNIFICANT CHANGE UP (ref 22–31)
CREAT SERPL-MCNC: 0.99 MG/DL — SIGNIFICANT CHANGE UP (ref 0.5–1.3)
GLUCOSE SERPL-MCNC: 102 MG/DL — HIGH (ref 70–99)
HCT VFR BLD CALC: 35 % — SIGNIFICANT CHANGE UP (ref 34.5–45)
HGB BLD-MCNC: 11.1 G/DL — LOW (ref 11.5–15.5)
MAGNESIUM SERPL-MCNC: 1.7 MG/DL — SIGNIFICANT CHANGE UP (ref 1.6–2.6)
MCHC RBC-ENTMCNC: 31.7 % — LOW (ref 32–36)
MCHC RBC-ENTMCNC: 32.7 PG — SIGNIFICANT CHANGE UP (ref 27–34)
MCV RBC AUTO: 103.2 FL — HIGH (ref 80–100)
PHOSPHATE SERPL-MCNC: 3.2 MG/DL — SIGNIFICANT CHANGE UP (ref 2.5–4.5)
PLATELET # BLD AUTO: 142 K/UL — LOW (ref 150–400)
PMV BLD: 9.2 FL — SIGNIFICANT CHANGE UP (ref 7–13)
POTASSIUM SERPL-MCNC: 4.1 MMOL/L — SIGNIFICANT CHANGE UP (ref 3.5–5.3)
POTASSIUM SERPL-SCNC: 4.1 MMOL/L — SIGNIFICANT CHANGE UP (ref 3.5–5.3)
RBC # BLD: 3.39 M/UL — LOW (ref 3.8–5.2)
RBC # FLD: 12.9 % — SIGNIFICANT CHANGE UP (ref 10.3–14.5)
SODIUM SERPL-SCNC: 140 MMOL/L — SIGNIFICANT CHANGE UP (ref 135–145)
WBC # BLD: 10.26 K/UL — SIGNIFICANT CHANGE UP (ref 3.8–10.5)
WBC # FLD AUTO: 10.26 K/UL — SIGNIFICANT CHANGE UP (ref 3.8–10.5)

## 2017-04-27 RX ORDER — MAGNESIUM SULFATE 500 MG/ML
2 VIAL (ML) INJECTION ONCE
Qty: 0 | Refills: 0 | Status: COMPLETED | OUTPATIENT
Start: 2017-04-27 | End: 2017-04-27

## 2017-04-27 RX ORDER — OXYCODONE HYDROCHLORIDE 5 MG/1
10 TABLET ORAL
Qty: 0 | Refills: 0 | Status: DISCONTINUED | OUTPATIENT
Start: 2017-04-27 | End: 2017-04-29

## 2017-04-27 RX ORDER — HYDROMORPHONE HYDROCHLORIDE 2 MG/ML
1 INJECTION INTRAMUSCULAR; INTRAVENOUS; SUBCUTANEOUS EVERY 6 HOURS
Qty: 0 | Refills: 0 | Status: DISCONTINUED | OUTPATIENT
Start: 2017-04-27 | End: 2017-04-29

## 2017-04-27 RX ADMIN — OXYCODONE HYDROCHLORIDE 20 MILLIGRAM(S): 5 TABLET ORAL at 07:32

## 2017-04-27 RX ADMIN — OXYCODONE HYDROCHLORIDE 20 MILLIGRAM(S): 5 TABLET ORAL at 17:44

## 2017-04-27 RX ADMIN — PANTOPRAZOLE SODIUM 40 MILLIGRAM(S): 20 TABLET, DELAYED RELEASE ORAL at 07:02

## 2017-04-27 RX ADMIN — HYDROMORPHONE HYDROCHLORIDE 1 MILLIGRAM(S): 2 INJECTION INTRAMUSCULAR; INTRAVENOUS; SUBCUTANEOUS at 18:00

## 2017-04-27 RX ADMIN — OXYCODONE HYDROCHLORIDE 10 MILLIGRAM(S): 5 TABLET ORAL at 23:57

## 2017-04-27 RX ADMIN — HYDROMORPHONE HYDROCHLORIDE 1 MILLIGRAM(S): 2 INJECTION INTRAMUSCULAR; INTRAVENOUS; SUBCUTANEOUS at 19:00

## 2017-04-27 RX ADMIN — Medication 50 GRAM(S): at 10:59

## 2017-04-27 RX ADMIN — SIMVASTATIN 10 MILLIGRAM(S): 20 TABLET, FILM COATED ORAL at 22:53

## 2017-04-27 RX ADMIN — CARVEDILOL PHOSPHATE 6.25 MILLIGRAM(S): 80 CAPSULE, EXTENDED RELEASE ORAL at 07:02

## 2017-04-27 RX ADMIN — OXYCODONE HYDROCHLORIDE 20 MILLIGRAM(S): 5 TABLET ORAL at 07:02

## 2017-04-27 RX ADMIN — HYDROMORPHONE HYDROCHLORIDE 30 MILLILITER(S): 2 INJECTION INTRAMUSCULAR; INTRAVENOUS; SUBCUTANEOUS at 08:26

## 2017-04-27 RX ADMIN — ZOLPIDEM TARTRATE 5 MILLIGRAM(S): 10 TABLET ORAL at 22:53

## 2017-04-27 RX ADMIN — CARVEDILOL PHOSPHATE 6.25 MILLIGRAM(S): 80 CAPSULE, EXTENDED RELEASE ORAL at 17:47

## 2017-04-27 RX ADMIN — ZOLPIDEM TARTRATE 5 MILLIGRAM(S): 10 TABLET ORAL at 00:58

## 2017-04-27 RX ADMIN — OXYCODONE HYDROCHLORIDE 10 MILLIGRAM(S): 5 TABLET ORAL at 17:44

## 2017-04-27 RX ADMIN — OXYCODONE HYDROCHLORIDE 10 MILLIGRAM(S): 5 TABLET ORAL at 19:57

## 2017-04-27 RX ADMIN — OXYCODONE HYDROCHLORIDE 10 MILLIGRAM(S): 5 TABLET ORAL at 16:52

## 2017-04-27 RX ADMIN — OXYCODONE HYDROCHLORIDE 10 MILLIGRAM(S): 5 TABLET ORAL at 20:55

## 2017-04-27 RX ADMIN — OXYCODONE HYDROCHLORIDE 10 MILLIGRAM(S): 5 TABLET ORAL at 22:57

## 2017-04-27 RX ADMIN — RIVAROXABAN 20 MILLIGRAM(S): KIT at 11:00

## 2017-04-27 RX ADMIN — OXYCODONE HYDROCHLORIDE 20 MILLIGRAM(S): 5 TABLET ORAL at 17:47

## 2017-04-28 RX ADMIN — HYDROMORPHONE HYDROCHLORIDE 1 MILLIGRAM(S): 2 INJECTION INTRAMUSCULAR; INTRAVENOUS; SUBCUTANEOUS at 12:26

## 2017-04-28 RX ADMIN — OXYCODONE HYDROCHLORIDE 20 MILLIGRAM(S): 5 TABLET ORAL at 07:20

## 2017-04-28 RX ADMIN — HYDROMORPHONE HYDROCHLORIDE 1 MILLIGRAM(S): 2 INJECTION INTRAMUSCULAR; INTRAVENOUS; SUBCUTANEOUS at 12:40

## 2017-04-28 RX ADMIN — OXYCODONE HYDROCHLORIDE 10 MILLIGRAM(S): 5 TABLET ORAL at 16:46

## 2017-04-28 RX ADMIN — CARVEDILOL PHOSPHATE 6.25 MILLIGRAM(S): 80 CAPSULE, EXTENDED RELEASE ORAL at 18:20

## 2017-04-28 RX ADMIN — CARVEDILOL PHOSPHATE 6.25 MILLIGRAM(S): 80 CAPSULE, EXTENDED RELEASE ORAL at 07:20

## 2017-04-28 RX ADMIN — OXYCODONE HYDROCHLORIDE 10 MILLIGRAM(S): 5 TABLET ORAL at 13:46

## 2017-04-28 RX ADMIN — OXYCODONE HYDROCHLORIDE 10 MILLIGRAM(S): 5 TABLET ORAL at 05:30

## 2017-04-28 RX ADMIN — ZOLPIDEM TARTRATE 5 MILLIGRAM(S): 10 TABLET ORAL at 00:28

## 2017-04-28 RX ADMIN — HYDROMORPHONE HYDROCHLORIDE 1 MILLIGRAM(S): 2 INJECTION INTRAMUSCULAR; INTRAVENOUS; SUBCUTANEOUS at 08:00

## 2017-04-28 RX ADMIN — HYDROMORPHONE HYDROCHLORIDE 1 MILLIGRAM(S): 2 INJECTION INTRAMUSCULAR; INTRAVENOUS; SUBCUTANEOUS at 00:50

## 2017-04-28 RX ADMIN — HYDROMORPHONE HYDROCHLORIDE 1 MILLIGRAM(S): 2 INJECTION INTRAMUSCULAR; INTRAVENOUS; SUBCUTANEOUS at 07:29

## 2017-04-28 RX ADMIN — OXYCODONE HYDROCHLORIDE 10 MILLIGRAM(S): 5 TABLET ORAL at 23:03

## 2017-04-28 RX ADMIN — OXYCODONE HYDROCHLORIDE 10 MILLIGRAM(S): 5 TABLET ORAL at 17:45

## 2017-04-28 RX ADMIN — OXYCODONE HYDROCHLORIDE 10 MILLIGRAM(S): 5 TABLET ORAL at 10:24

## 2017-04-28 RX ADMIN — ZOLPIDEM TARTRATE 5 MILLIGRAM(S): 10 TABLET ORAL at 23:12

## 2017-04-28 RX ADMIN — RIVAROXABAN 20 MILLIGRAM(S): KIT at 11:19

## 2017-04-28 RX ADMIN — OXYCODONE HYDROCHLORIDE 10 MILLIGRAM(S): 5 TABLET ORAL at 21:20

## 2017-04-28 RX ADMIN — OXYCODONE HYDROCHLORIDE 10 MILLIGRAM(S): 5 TABLET ORAL at 14:45

## 2017-04-28 RX ADMIN — SIMVASTATIN 10 MILLIGRAM(S): 20 TABLET, FILM COATED ORAL at 21:20

## 2017-04-28 RX ADMIN — OXYCODONE HYDROCHLORIDE 10 MILLIGRAM(S): 5 TABLET ORAL at 09:25

## 2017-04-28 RX ADMIN — HYDROMORPHONE HYDROCHLORIDE 1 MILLIGRAM(S): 2 INJECTION INTRAMUSCULAR; INTRAVENOUS; SUBCUTANEOUS at 01:10

## 2017-04-28 RX ADMIN — PANTOPRAZOLE SODIUM 40 MILLIGRAM(S): 20 TABLET, DELAYED RELEASE ORAL at 07:20

## 2017-04-28 RX ADMIN — OXYCODONE HYDROCHLORIDE 20 MILLIGRAM(S): 5 TABLET ORAL at 18:58

## 2017-04-28 RX ADMIN — OXYCODONE HYDROCHLORIDE 20 MILLIGRAM(S): 5 TABLET ORAL at 18:20

## 2017-04-28 RX ADMIN — OXYCODONE HYDROCHLORIDE 10 MILLIGRAM(S): 5 TABLET ORAL at 04:30

## 2017-04-29 ENCOUNTER — TRANSCRIPTION ENCOUNTER (OUTPATIENT)
Age: 67
End: 2017-04-29

## 2017-04-29 VITALS
TEMPERATURE: 97 F | HEART RATE: 80 BPM | RESPIRATION RATE: 17 BRPM | OXYGEN SATURATION: 98 % | SYSTOLIC BLOOD PRESSURE: 139 MMHG | DIASTOLIC BLOOD PRESSURE: 72 MMHG

## 2017-04-29 LAB
BUN SERPL-MCNC: 10 MG/DL — SIGNIFICANT CHANGE UP (ref 7–23)
CALCIUM SERPL-MCNC: 9.5 MG/DL — SIGNIFICANT CHANGE UP (ref 8.4–10.5)
CHLORIDE SERPL-SCNC: 99 MMOL/L — SIGNIFICANT CHANGE UP (ref 98–107)
CO2 SERPL-SCNC: 26 MMOL/L — SIGNIFICANT CHANGE UP (ref 22–31)
CREAT SERPL-MCNC: 0.93 MG/DL — SIGNIFICANT CHANGE UP (ref 0.5–1.3)
GLUCOSE SERPL-MCNC: 114 MG/DL — HIGH (ref 70–99)
HCT VFR BLD CALC: 30.3 % — LOW (ref 34.5–45)
HGB BLD-MCNC: 9.9 G/DL — LOW (ref 11.5–15.5)
MAGNESIUM SERPL-MCNC: 1.1 MG/DL — LOW (ref 1.6–2.6)
MCHC RBC-ENTMCNC: 32.7 % — SIGNIFICANT CHANGE UP (ref 32–36)
MCHC RBC-ENTMCNC: 33.3 PG — SIGNIFICANT CHANGE UP (ref 27–34)
MCV RBC AUTO: 102 FL — HIGH (ref 80–100)
PHOSPHATE SERPL-MCNC: 4.2 MG/DL — SIGNIFICANT CHANGE UP (ref 2.5–4.5)
PLATELET # BLD AUTO: 146 K/UL — LOW (ref 150–400)
PMV BLD: 9.2 FL — SIGNIFICANT CHANGE UP (ref 7–13)
POTASSIUM SERPL-MCNC: 4.1 MMOL/L — SIGNIFICANT CHANGE UP (ref 3.5–5.3)
POTASSIUM SERPL-SCNC: 4.1 MMOL/L — SIGNIFICANT CHANGE UP (ref 3.5–5.3)
RBC # BLD: 2.97 M/UL — LOW (ref 3.8–5.2)
RBC # FLD: 12.5 % — SIGNIFICANT CHANGE UP (ref 10.3–14.5)
SODIUM SERPL-SCNC: 137 MMOL/L — SIGNIFICANT CHANGE UP (ref 135–145)
WBC # BLD: 8.43 K/UL — SIGNIFICANT CHANGE UP (ref 3.8–10.5)
WBC # FLD AUTO: 8.43 K/UL — SIGNIFICANT CHANGE UP (ref 3.8–10.5)

## 2017-04-29 RX ORDER — ACETAMINOPHEN 500 MG
650 TABLET ORAL EVERY 6 HOURS
Qty: 0 | Refills: 0 | Status: DISCONTINUED | OUTPATIENT
Start: 2017-04-29 | End: 2017-04-29

## 2017-04-29 RX ORDER — ENOXAPARIN SODIUM 100 MG/ML
0 INJECTION SUBCUTANEOUS
Qty: 0 | Refills: 0 | COMMUNITY

## 2017-04-29 RX ORDER — MAGNESIUM SULFATE 500 MG/ML
2 VIAL (ML) INJECTION ONCE
Qty: 0 | Refills: 0 | Status: COMPLETED | OUTPATIENT
Start: 2017-04-29 | End: 2017-04-29

## 2017-04-29 RX ADMIN — OXYCODONE HYDROCHLORIDE 10 MILLIGRAM(S): 5 TABLET ORAL at 01:06

## 2017-04-29 RX ADMIN — OXYCODONE HYDROCHLORIDE 20 MILLIGRAM(S): 5 TABLET ORAL at 07:56

## 2017-04-29 RX ADMIN — ZOLPIDEM TARTRATE 5 MILLIGRAM(S): 10 TABLET ORAL at 00:26

## 2017-04-29 RX ADMIN — OXYCODONE HYDROCHLORIDE 10 MILLIGRAM(S): 5 TABLET ORAL at 01:50

## 2017-04-29 RX ADMIN — Medication 50 GRAM(S): at 09:15

## 2017-04-29 RX ADMIN — OXYCODONE HYDROCHLORIDE 10 MILLIGRAM(S): 5 TABLET ORAL at 14:22

## 2017-04-29 RX ADMIN — Medication 650 MILLIGRAM(S): at 12:15

## 2017-04-29 RX ADMIN — Medication 650 MILLIGRAM(S): at 13:09

## 2017-04-29 RX ADMIN — CARVEDILOL PHOSPHATE 6.25 MILLIGRAM(S): 80 CAPSULE, EXTENDED RELEASE ORAL at 06:42

## 2017-04-29 RX ADMIN — RIVAROXABAN 20 MILLIGRAM(S): KIT at 12:15

## 2017-04-29 RX ADMIN — OXYCODONE HYDROCHLORIDE 10 MILLIGRAM(S): 5 TABLET ORAL at 09:15

## 2017-04-29 RX ADMIN — PANTOPRAZOLE SODIUM 40 MILLIGRAM(S): 20 TABLET, DELAYED RELEASE ORAL at 06:49

## 2017-04-29 RX ADMIN — OXYCODONE HYDROCHLORIDE 20 MILLIGRAM(S): 5 TABLET ORAL at 06:49

## 2017-04-29 RX ADMIN — OXYCODONE HYDROCHLORIDE 10 MILLIGRAM(S): 5 TABLET ORAL at 15:07

## 2017-04-29 RX ADMIN — OXYCODONE HYDROCHLORIDE 10 MILLIGRAM(S): 5 TABLET ORAL at 10:00

## 2017-04-29 NOTE — DISCHARGE NOTE ADULT - INSTRUCTIONS
You may resume a low fiber diet Call MD for signs of infection, fever, chills foul smelling drainage from wound

## 2017-04-29 NOTE — DISCHARGE NOTE ADULT - CARE PROVIDER_API CALL
Sloan Zhou), ColonRectal Surgery; Surgery  [address] [place] [address]  Phone: [phone]  Fax: [phone]    Parish Ribeiro), Gynecologic Oncology; Obstetrics and Gynecology  [address] [place] [address]  Phone: [phone]  Fax: [phone]

## 2017-04-29 NOTE — DISCHARGE NOTE ADULT - CARE PROVIDERS DIRECT ADDRESSES
[email or web address]

## 2017-04-29 NOTE — DISCHARGE NOTE ADULT - PLAN OF CARE
Followup treatment Please follow up with Dr. Zhou on Monday 5/1/17 or Wednesday 5/3/17. You may call [phone] to schedule an appointment.    Please follow up with Dr. Ribeiro and your Oncologist at [facility].    You have two drains in place. Please keep drains in place until removed by Dr. Zhou in the office. Please record all drain outputs.    You may resume a low fiber diet and regular activities. Please do not participate in heavy lifting or strenuous exercise. Do not drive while taking pain medication.    Please go to the Emergency Department if you experience pain not controlled by pain medication, fevers, chills or bleeding that will not stop. Please follow up with Dr. Zhou on Monday 5/1/17 or Wednesday 5/3/17. You may call [phone] to schedule an appointment.

## 2017-04-29 NOTE — DISCHARGE NOTE ADULT - CARE PLAN
Principal Discharge DX:	Malignant neoplasm of ovary, unspecified laterality  Goal:	Followup treatment  Instructions for follow-up, activity and diet:	Please follow up with Dr. Zhou on Monday 5/1/17 or Wednesday 5/3/17. You may call [phone] to schedule an appointment.    Please follow up with Dr. Ribeiro and your Oncologist at [facility].    You have two drains in place. Please keep drains in place until removed by Dr. Zhou in the office. Please record all drain outputs.    You may resume a low fiber diet and regular activities. Please do not participate in heavy lifting or strenuous exercise. Do not drive while taking pain medication.    Please go to the Emergency Department if you experience pain not controlled by pain medication, fevers, chills or bleeding that will not stop.  Secondary Diagnosis:	Fistula of intestine  Instructions for follow-up, activity and diet:	Please follow up with Dr. Zhou on Monday 5/1/17 or Wednesday 5/3/17. You may call [phone] to schedule an appointment. Principal Discharge DX:	Malignant neoplasm of ovary, unspecified laterality  Goal:	Followup treatment  Instructions for follow-up, activity and diet:	Please follow up with Dr. Zhou on Monday 5/1/17 or Wednesday 5/3/17. You may call [phone] to schedule an appointment.    Please follow up with Dr. Ribeiro and your Oncologist at [facility].    You have two drains in place. Please keep drains in place until removed by Dr. Zhou in the office. Please record all drain outputs.    You may resume a low fiber diet and regular activities. Please do not participate in heavy lifting or strenuous exercise. Do not drive while taking pain medication.    Please go to the Emergency Department if you experience pain not controlled by pain medication, fevers, chills or bleeding that will not stop.  Secondary Diagnosis:	Fistula of intestine  Instructions for follow-up, activity and diet:	Please follow up with Dr. Zhou on Monday 5/1/17 or Wednesday 5/3/17. You may call [phone] to schedule an appointment. Principal Discharge DX:	Malignant neoplasm of ovary, unspecified laterality  Goal:	Followup treatment  Instructions for follow-up, activity and diet:	Please follow up with Dr. Zhou on Monday 5/1/17 or Wednesday 5/3/17. You may call [phone] to schedule an appointment.    Please follow up with Dr. Ribeiro and your Oncologist at [facility].    You have two drains in place. Please keep drains in place until removed by Dr. Zhou in the office. Please record all drain outputs.    You may resume a low fiber diet and regular activities. Please do not participate in heavy lifting or strenuous exercise. Do not drive while taking pain medication.    Please go to the Emergency Department if you experience pain not controlled by pain medication, fevers, chills or bleeding that will not stop.  Secondary Diagnosis:	Fistula of intestine  Instructions for follow-up, activity and diet:	Please follow up with Dr. Zhou on Monday 5/1/17 or Wednesday 5/3/17. You may call [phone] to schedule an appointment. Principal Discharge DX:	Malignant neoplasm of ovary, unspecified laterality  Goal:	Followup treatment  Instructions for follow-up, activity and diet:	Please follow up with Dr. Zhou on Monday 5/1/17 or Wednesday 5/3/17. You may call [phone] to schedule an appointment.    Please follow up with Dr. Ribeiro and your Oncologist at [facility].    You have two drains in place. Please keep drains in place until removed by Dr. Zhou in the office. Please record all drain outputs.    You may resume a low fiber diet and regular activities. Please do not participate in heavy lifting or strenuous exercise. Do not drive while taking pain medication.    Please go to the Emergency Department if you experience pain not controlled by pain medication, fevers, chills or bleeding that will not stop.  Secondary Diagnosis:	Fistula of intestine  Instructions for follow-up, activity and diet:	Please follow up with Dr. Zhou on Monday 5/1/17 or Wednesday 5/3/17. You may call [phone] to schedule an appointment. Principal Discharge DX:	Malignant neoplasm of ovary, unspecified laterality  Goal:	Followup treatment  Instructions for follow-up, activity and diet:	Please follow up with Dr. Zhou on Monday 5/1/17 or Wednesday 5/3/17. You may call [phone] to schedule an appointment.    Please follow up with Dr. Ribeiro and your Oncologist at [facility].    You have two drains in place. Please keep drains in place until removed by Dr. Zhou in the office. Please record all drain outputs.    You may resume a low fiber diet and regular activities. Please do not participate in heavy lifting or strenuous exercise. Do not drive while taking pain medication.    Please go to the Emergency Department if you experience pain not controlled by pain medication, fevers, chills or bleeding that will not stop.  Secondary Diagnosis:	Fistula of intestine  Instructions for follow-up, activity and diet:	Please follow up with Dr. Zhou on Monday 5/1/17 or Wednesday 5/3/17. You may call [phone] to schedule an appointment.

## 2017-04-29 NOTE — DISCHARGE NOTE ADULT - HOSPITAL COURSE
67 year old female, PMH HTN, ovarian cancer s/p tumor debulking, chemotherapy, PRABHU/BSO, s/p Reymundo's for sigmoid perforation, pulmonary embolism, presented for closure of colostomy and takedown of enterocutaneous fistula on 4/25/17. During the operation, possible peritoneal implants were identified. These implants were biopsied and the colostomy closure was aborted. The patient's ventral hernia was repaired. Postoperatively, the patient was placed on a PCA and made NPO. On POD#1, the patient's pain was well controlled with the PCA. On POD#2, the patient was taken off PCA, placed on a clear liquid diet and started on oral pain medication. The patient tolerated the diet well and was advanced to a low fiber diet. On POD#4, the patient was voiding, well tolerating diet, ambulating and pain was well controlled with medication. She was deemed to be stable for discharge.    At the time of discharge, the patient was hemodynamically stable, was tolerating PO diet, was voiding urine and passing stool, was ambulating, and was comfortable with adequate pain control. The patient was instructed to follow up with Dr. Zhou within 1-2 weeks after discharge from the hospital. The patient was instructed to follow up with Dr. Ribeiro and her oncologist at [facility] for management of her ovarian cancer. 67 year old female, PMH HTN, ovarian cancer s/p tumor debulking, chemotherapy, PRABHU/BSO, s/p Reymundo's for sigmoid perforation, pulmonary embolism, presented for closure of colostomy and takedown of enterocutaneous fistula on 4/25/17. During the operation, possible peritoneal implants were identified. These implants were biopsied and the colostomy closure was aborted. The patient's ventral hernia was repaired. Postoperatively, the patient was placed on a PCA and made NPO. On POD#1, the patient's pain was well controlled with the PCA. On POD#2, the patient was taken off PCA, placed on a clear liquid diet and started on oral pain medication. The patient tolerated the diet well and was advanced to a low fiber diet. On POD#4, the patient was voiding, well tolerating diet, ambulating and pain was well controlled with medication. She was deemed to be stable for discharge.    At the time of discharge, the patient was hemodynamically stable, was tolerating PO diet, was voiding urine and passing stool, was ambulating, and was comfortable with adequate pain control. The patient was instructed to follow up with Dr. Zhou within 1-2 weeks after discharge from the hospital. The patient was instructed to follow up with Dr. Ribeiro and her oncologist at [facility] for management of her ovarian cancer.    ISTOP: [id number]

## 2017-04-29 NOTE — DISCHARGE NOTE ADULT - MEDICATION SUMMARY - MEDICATIONS TO TAKE
I will START or STAY ON the medications listed below when I get home from the hospital:    oxyCODONE 10 mg oral tablet  -- 1 tab(s) by mouth every 6 hours MDD:40mg - for moderate pain  -- Caution federal law prohibits the transfer of this drug to any person other  than the person for whom it was prescribed.  Check with your doctor before becoming pregnant.  Do not drink alcoholic beverages when taking this medication.  May cause drowsiness.  Alcohol may intensify this effect.  Use care when operating dangerous machinery.  This drug may impair the ability to drive or operate machinery.  Use care until you become familiar with its effects.  This prescription cannot be refilled.  Using more of this medication than prescribed may cause serious breathing problems.    -- Indication: For Pain    Xarelto 20 mg oral tablet  -- 1 tab(s) by mouth once a day (in the evening)  -- Indication: For Anticoagulation    clonazePAM 0.5 mg oral tablet  -- 1 tab(s) by mouth 2 times a day, As needed, anxiety  -- Indication: For Anxiety    simvastatin 10 mg oral tablet  -- 1 tab(s) by mouth once a day (at bedtime)  -- Indication: For Hyperlipidemia    Ambien 10 mg oral tablet  -- 1 tab(s) by mouth once a day (at bedtime) as needed  -- Indication: For Anxiety    carvedilol 6.25 mg oral tablet  -- 1 tab(s) by mouth 2 times a day  -- Indication: For Hypertension    miconazole 2% topical cream  -- 1 application on skin 2 times a day  -- Indication: For Antifungal    Protonix 20 mg oral delayed release tablet  -- 1 tab(s) by mouth once a day  -- Indication: For GERD    multivitamin  -- 1 tab(s) by mouth once a day. Last dose 4/10/7  -- Indication: For Supplement

## 2017-04-29 NOTE — DISCHARGE NOTE ADULT - NS AS ACTIVITY OBS
Do not drive while taking pain medication/Walking-Outdoors allowed/Do not make important decisions/Do not drive or operate machinery/Walking-Indoors allowed

## 2017-04-29 NOTE — DISCHARGE NOTE ADULT - PATIENT PORTAL LINK FT
“You can access the Follow[facility] Patient Portal, offered by [facility], by registering with the following website: [email or web address]”

## 2017-04-29 NOTE — DISCHARGE NOTE ADULT - HOME CARE AGENCY
[facility] [phone]. Initial visit will be day after discharge home. A nurse will call prior to home visit

## 2017-05-03 ENCOUNTER — APPOINTMENT (OUTPATIENT)
Dept: SURGERY | Facility: CLINIC | Age: 67
End: 2017-05-03

## 2017-05-03 VITALS
WEIGHT: 120 LBS | DIASTOLIC BLOOD PRESSURE: 80 MMHG | TEMPERATURE: 97.6 F | HEIGHT: 64 IN | BODY MASS INDEX: 20.49 KG/M2 | HEART RATE: 74 BPM | SYSTOLIC BLOOD PRESSURE: 143 MMHG

## 2017-05-05 ENCOUNTER — APPOINTMENT (OUTPATIENT)
Dept: GYNECOLOGIC ONCOLOGY | Facility: CLINIC | Age: 67
End: 2017-05-05

## 2017-05-06 RX ORDER — CLONAZEPAM 1 MG/1
1 TABLET ORAL
Qty: 20 | Refills: 0 | Status: ACTIVE | COMMUNITY
Start: 2017-04-17

## 2017-05-10 ENCOUNTER — APPOINTMENT (OUTPATIENT)
Dept: SURGERY | Facility: CLINIC | Age: 67
End: 2017-05-10

## 2017-05-10 VITALS
BODY MASS INDEX: 20.49 KG/M2 | SYSTOLIC BLOOD PRESSURE: 137 MMHG | DIASTOLIC BLOOD PRESSURE: 70 MMHG | WEIGHT: 120 LBS | HEIGHT: 64 IN | HEART RATE: 68 BPM

## 2017-05-10 DIAGNOSIS — Z09 ENCOUNTER FOR FOLLOW-UP EXAMINATION AFTER COMPLETED TREATMENT FOR CONDITIONS OTHER THAN MALIGNANT NEOPLASM: ICD-10-CM

## 2017-05-15 ENCOUNTER — APPOINTMENT (OUTPATIENT)
Dept: SURGERY | Facility: CLINIC | Age: 67
End: 2017-05-15

## 2017-05-15 VITALS
DIASTOLIC BLOOD PRESSURE: 77 MMHG | TEMPERATURE: 98.4 F | BODY MASS INDEX: 20.49 KG/M2 | HEIGHT: 64 IN | HEART RATE: 60 BPM | SYSTOLIC BLOOD PRESSURE: 147 MMHG | WEIGHT: 120 LBS

## 2017-05-22 ENCOUNTER — APPOINTMENT (OUTPATIENT)
Dept: SURGERY | Facility: CLINIC | Age: 67
End: 2017-05-22

## 2017-05-22 VITALS
HEIGHT: 64 IN | BODY MASS INDEX: 20.49 KG/M2 | SYSTOLIC BLOOD PRESSURE: 113 MMHG | DIASTOLIC BLOOD PRESSURE: 73 MMHG | TEMPERATURE: 98 F | HEART RATE: 69 BPM | WEIGHT: 120 LBS

## 2017-05-31 ENCOUNTER — APPOINTMENT (OUTPATIENT)
Dept: SURGERY | Facility: CLINIC | Age: 67
End: 2017-05-31

## 2017-05-31 VITALS
WEIGHT: 120 LBS | HEART RATE: 68 BPM | TEMPERATURE: 98.7 F | DIASTOLIC BLOOD PRESSURE: 65 MMHG | SYSTOLIC BLOOD PRESSURE: 110 MMHG | BODY MASS INDEX: 20.49 KG/M2 | HEIGHT: 64 IN

## 2017-06-14 ENCOUNTER — APPOINTMENT (OUTPATIENT)
Dept: SURGERY | Facility: CLINIC | Age: 67
End: 2017-06-14

## 2017-06-14 VITALS
SYSTOLIC BLOOD PRESSURE: 114 MMHG | TEMPERATURE: 98 F | HEIGHT: 64 IN | BODY MASS INDEX: 19.29 KG/M2 | HEART RATE: 61 BPM | WEIGHT: 113 LBS | DIASTOLIC BLOOD PRESSURE: 72 MMHG

## 2017-06-14 DIAGNOSIS — Z98.890 OTHER SPECIFIED POSTPROCEDURAL STATES: ICD-10-CM

## 2017-06-23 ENCOUNTER — EMERGENCY (EMERGENCY)
Facility: HOSPITAL | Age: 67
LOS: 1 days | Discharge: HOME | End: 2017-06-23
Attending: EMERGENCY MEDICINE | Admitting: EMERGENCY MEDICINE
Payer: MEDICARE

## 2017-06-23 VITALS
DIASTOLIC BLOOD PRESSURE: 74 MMHG | TEMPERATURE: 98 F | SYSTOLIC BLOOD PRESSURE: 131 MMHG | OXYGEN SATURATION: 98 % | HEART RATE: 80 BPM | RESPIRATION RATE: 18 BRPM

## 2017-06-23 VITALS
SYSTOLIC BLOOD PRESSURE: 160 MMHG | HEART RATE: 81 BPM | DIASTOLIC BLOOD PRESSURE: 72 MMHG | TEMPERATURE: 99 F | RESPIRATION RATE: 18 BRPM | OXYGEN SATURATION: 97 %

## 2017-06-23 DIAGNOSIS — Z98.890 OTHER SPECIFIED POSTPROCEDURAL STATES: Chronic | ICD-10-CM

## 2017-06-23 DIAGNOSIS — Z90.710 ACQUIRED ABSENCE OF BOTH CERVIX AND UTERUS: Chronic | ICD-10-CM

## 2017-06-23 DIAGNOSIS — N85.9 NONINFLAMMATORY DISORDER OF UTERUS, UNSPECIFIED: Chronic | ICD-10-CM

## 2017-06-23 DIAGNOSIS — Z90.49 ACQUIRED ABSENCE OF OTHER SPECIFIED PARTS OF DIGESTIVE TRACT: Chronic | ICD-10-CM

## 2017-06-23 DIAGNOSIS — Z93.3 COLOSTOMY STATUS: Chronic | ICD-10-CM

## 2017-06-23 LAB
ALBUMIN SERPL ELPH-MCNC: 3.7 G/DL — SIGNIFICANT CHANGE UP (ref 3.3–5)
ALP SERPL-CCNC: 69 U/L — SIGNIFICANT CHANGE UP (ref 40–120)
ALT FLD-CCNC: 11 U/L — SIGNIFICANT CHANGE UP (ref 4–33)
APPEARANCE UR: CLEAR — SIGNIFICANT CHANGE UP
APTT BLD: 37.2 SEC — SIGNIFICANT CHANGE UP (ref 27.5–37.4)
AST SERPL-CCNC: 17 U/L — SIGNIFICANT CHANGE UP (ref 4–32)
BASE EXCESS BLDV CALC-SCNC: 3.2 MMOL/L — SIGNIFICANT CHANGE UP
BASOPHILS # BLD AUTO: 0.02 K/UL — SIGNIFICANT CHANGE UP (ref 0–0.2)
BASOPHILS NFR BLD AUTO: 0.2 % — SIGNIFICANT CHANGE UP (ref 0–2)
BILIRUB SERPL-MCNC: 0.5 MG/DL — SIGNIFICANT CHANGE UP (ref 0.2–1.2)
BILIRUB UR-MCNC: NEGATIVE — SIGNIFICANT CHANGE UP
BLD GP AB SCN SERPL QL: NEGATIVE — SIGNIFICANT CHANGE UP
BLOOD GAS VENOUS - CREATININE: 0.77 MG/DL — SIGNIFICANT CHANGE UP (ref 0.5–1.3)
BLOOD UR QL VISUAL: HIGH
BUN SERPL-MCNC: 22 MG/DL — SIGNIFICANT CHANGE UP (ref 7–23)
CALCIUM SERPL-MCNC: 9.4 MG/DL — SIGNIFICANT CHANGE UP (ref 8.4–10.5)
CHLORIDE BLDV-SCNC: 99 MMOL/L — SIGNIFICANT CHANGE UP (ref 96–108)
CHLORIDE SERPL-SCNC: 96 MMOL/L — LOW (ref 98–107)
CO2 SERPL-SCNC: 27 MMOL/L — SIGNIFICANT CHANGE UP (ref 22–31)
COLOR SPEC: SIGNIFICANT CHANGE UP
CREAT SERPL-MCNC: 0.94 MG/DL — SIGNIFICANT CHANGE UP (ref 0.5–1.3)
EOSINOPHIL # BLD AUTO: 0.14 K/UL — SIGNIFICANT CHANGE UP (ref 0–0.5)
EOSINOPHIL NFR BLD AUTO: 1.7 % — SIGNIFICANT CHANGE UP (ref 0–6)
GAS PNL BLDV: 136 MMOL/L — SIGNIFICANT CHANGE UP (ref 136–146)
GLUCOSE BLDV-MCNC: 94 — SIGNIFICANT CHANGE UP (ref 70–99)
GLUCOSE SERPL-MCNC: 94 MG/DL — SIGNIFICANT CHANGE UP (ref 70–99)
GLUCOSE UR-MCNC: NEGATIVE — SIGNIFICANT CHANGE UP
HCO3 BLDV-SCNC: 26 MMOL/L — SIGNIFICANT CHANGE UP (ref 20–27)
HCT VFR BLD CALC: 30.2 % — LOW (ref 34.5–45)
HCT VFR BLDV CALC: 29.6 % — LOW (ref 34.5–45)
HGB BLD-MCNC: 9.4 G/DL — LOW (ref 11.5–15.5)
HGB BLDV-MCNC: 9.6 G/DL — LOW (ref 11.5–15.5)
IMM GRANULOCYTES NFR BLD AUTO: 0.6 % — SIGNIFICANT CHANGE UP (ref 0–1.5)
INR BLD: 2.07 — HIGH (ref 0.88–1.17)
KETONES UR-MCNC: NEGATIVE — SIGNIFICANT CHANGE UP
LACTATE BLDV-MCNC: 0.9 MMOL/L — SIGNIFICANT CHANGE UP (ref 0.5–2)
LEUKOCYTE ESTERASE UR-ACNC: NEGATIVE — SIGNIFICANT CHANGE UP
LYMPHOCYTES # BLD AUTO: 0.6 K/UL — LOW (ref 1–3.3)
LYMPHOCYTES # BLD AUTO: 7.1 % — LOW (ref 13–44)
MCHC RBC-ENTMCNC: 30.5 PG — SIGNIFICANT CHANGE UP (ref 27–34)
MCHC RBC-ENTMCNC: 31.1 % — LOW (ref 32–36)
MCV RBC AUTO: 98.1 FL — SIGNIFICANT CHANGE UP (ref 80–100)
MONOCYTES # BLD AUTO: 0.04 K/UL — SIGNIFICANT CHANGE UP (ref 0–0.9)
MONOCYTES NFR BLD AUTO: 0.5 % — LOW (ref 2–14)
NEUTROPHILS # BLD AUTO: 7.62 K/UL — HIGH (ref 1.8–7.4)
NEUTROPHILS NFR BLD AUTO: 89.9 % — HIGH (ref 43–77)
NITRITE UR-MCNC: NEGATIVE — SIGNIFICANT CHANGE UP
NON-SQ EPI CELLS # UR AUTO: <1 — SIGNIFICANT CHANGE UP
PCO2 BLDV: 51 MMHG — SIGNIFICANT CHANGE UP (ref 41–51)
PH BLDV: 7.36 PH — SIGNIFICANT CHANGE UP (ref 7.32–7.43)
PH UR: 5.5 — SIGNIFICANT CHANGE UP (ref 4.6–8)
PLATELET # BLD AUTO: 239 K/UL — SIGNIFICANT CHANGE UP (ref 150–400)
PMV BLD: 8.9 FL — SIGNIFICANT CHANGE UP (ref 7–13)
PO2 BLDV: < 24 MMHG — LOW (ref 35–40)
POTASSIUM BLDV-SCNC: 4.3 MMOL/L — SIGNIFICANT CHANGE UP (ref 3.4–4.5)
POTASSIUM SERPL-MCNC: 4.4 MMOL/L — SIGNIFICANT CHANGE UP (ref 3.5–5.3)
POTASSIUM SERPL-SCNC: 4.4 MMOL/L — SIGNIFICANT CHANGE UP (ref 3.5–5.3)
PROT SERPL-MCNC: 7.4 G/DL — SIGNIFICANT CHANGE UP (ref 6–8.3)
PROT UR-MCNC: 20 — SIGNIFICANT CHANGE UP
PROTHROM AB SERPL-ACNC: 23.5 SEC — HIGH (ref 9.8–13.1)
RBC # BLD: 3.08 M/UL — LOW (ref 3.8–5.2)
RBC # FLD: 14.1 % — SIGNIFICANT CHANGE UP (ref 10.3–14.5)
RBC CASTS # UR COMP ASSIST: SIGNIFICANT CHANGE UP (ref 0–?)
RH IG SCN BLD-IMP: POSITIVE — SIGNIFICANT CHANGE UP
SAO2 % BLDV: 21.5 % — LOW (ref 60–85)
SODIUM SERPL-SCNC: 138 MMOL/L — SIGNIFICANT CHANGE UP (ref 135–145)
SP GR SPEC: 1.01 — SIGNIFICANT CHANGE UP (ref 1–1.03)
SQUAMOUS # UR AUTO: SIGNIFICANT CHANGE UP
UROBILINOGEN FLD QL: NORMAL E.U. — SIGNIFICANT CHANGE UP (ref 0.1–0.2)
WBC # BLD: 8.47 K/UL — SIGNIFICANT CHANGE UP (ref 3.8–10.5)
WBC # FLD AUTO: 8.47 K/UL — SIGNIFICANT CHANGE UP (ref 3.8–10.5)
WBC UR QL: SIGNIFICANT CHANGE UP (ref 0–?)

## 2017-06-23 PROCEDURE — 99284 EMERGENCY DEPT VISIT MOD MDM: CPT | Mod: GC

## 2017-06-23 NOTE — ED PROVIDER NOTE - OBJECTIVE STATEMENT
68 yo F hx of ovarian CA, multiple abdominal surgeries, ostomy, here for stool at abdominal wound site. Pt states she is seen by visiting nurse and was noted today that she had stool at the site of the abdominal wound. otherwise without complaints. Denies fever, chills, vomiting, diarrhea, CP, SOB, abdominal pain. 66 yo F hx of ovarian CA, multiple abdominal surgeries, ostomy, here for stool at abdominal wound site. Pt states she is seen by visiting nurse and was noted today that she had stool at the site of the abdominal wound. otherwise without complaints. Denies fever, chills, vomiting, diarrhea, CP, SOB, abdominal pain.    Attendinyo female with extensive surgical history presents with stool protruding through chronic abdominal wound for one day.  No new abdominal pain.  Tolerating po well.

## 2017-06-23 NOTE — ED PROVIDER NOTE - CARE PLAN
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 GUNTER. Rest, drink plenty of fluids.  Advance activity as tolerated.  Continue all previously prescribed medications as directed. You can use motrin 600mg every 6-8 hours for pain or fever, and/or Tylenol 650 mg every 4 hours for pain/fever. Follow up with your primary care physician in 48-72 hours- bring copies of your results.  Return to the emergency department for chest pain, shortness of breath, dizziness, or worsening, concerning, or persistent symptoms.

## 2017-06-23 NOTE — ED PROVIDER NOTE - GASTROINTESTINAL NEGATIVE STATEMENT, MLM
no abdominal pain, no bloating, no constipation, no diarrhea, no nausea and no vomiting. +stool at wound site

## 2017-06-23 NOTE — ED PROVIDER NOTE - PLAN OF CARE
FOLLOW UP WITH DR. GUNTER. Rest, drink plenty of fluids.  Advance activity as tolerated.  Continue all previously prescribed medications as directed. You can use motrin 600mg every 6-8 hours for pain or fever, and/or Tylenol 650 mg every 4 hours for pain/fever. Follow up with your primary care physician in 48-72 hours- bring copies of your results.  Return to the emergency department for chest pain, shortness of breath, dizziness, or worsening, concerning, or persistent symptoms.

## 2017-06-23 NOTE — ED ADULT NURSE REASSESSMENT NOTE - NS ED NURSE REASSESS COMMENT FT1
Patient seen on skin care rounds after wound care referral received for assessment of abdominal fistula management and recommendations of topical management. Chart reviewed: Serum albumin 3.7g/dL, patient interviewed. Patient H/O of ovarian cancer s/p tumor debulking (4/2016) s/p Reymundo procedure for sigmoid perforation (4/2016), and chemotherapy (5/2016). Midline abdominal wound was slow healing, requiring skin graft (11/2016). As per patient, she was monitored for ovarian cancer at [facility], "once I was told that I was clear of cancer. Dr. Zhou planned for reversal of my Colostomy." As per patient during procedure by Dr. Zhou cancer tumors were noted, Colostomy was therefore not reversed (4/2017). New surgical abdominal wound was being treated by Visiting nurse services with wet-to-dry dressing three times a day. This morning when visiting nurse removed abdominal dressing a fistula was noted with stool effluent. Patient admitted with abdominal fistula. Followed by General Surgery and Dr. Zhou.  Assessment: General: A&Ox4, ambulates independently, Colostomy pouch in place and cared for by patient and  Anders. Skin warm, dry, adequate skin turgor.  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 Base of wound with thin pink, dry, exposed fascia. No drainage noted. Scattered areas along wound edges with epibole noted. At 12 o'clock in base of wound is an enterocutaneous stomatized fistula measuring 1 1/4" actively functioning formed stool. Periwound skin hypopigmentation surrounded by hyperpigmentation and irritant dermatitis from paper tape.    please see second note for wound/fistula management and recommendations.

## 2017-06-23 NOTE — ED ADULT NURSE NOTE - CHPI ED SYMPTOMS NEG
no decreased eating/drinking/no chills/no fever/no dizziness/no vomiting/no tingling/no weakness/no nausea

## 2017-06-23 NOTE — ED PROVIDER NOTE - GASTROINTESTINAL, MLM
Abdomen soft, non-tender, no guarding. 3 cm x 2 cm abdominal wound covered in dressing, +stool at top of wound site.

## 2017-06-23 NOTE — ED ADULT NURSE REASSESSMENT NOTE - NS ED NURSE REASSESS COMMENT FT1
Continued wound/ostomy note:  Wound management and recommendations:  1. Cleanse wound and periwound skin with NS. Pat dry.   2. Apply stoma powder to periwound skin and circumferentially around stomatized fistula, brushed away excess. Pat with liquid barrier film, allowed to dry.   3. Applied 2" adapt barrier ring around stomatized fistula on base of wound (where powder and liquid barrier film was placed).   4. Apply Hydrogel to wound base, cover with fluffled gauze, secure with tegaderm, leaving stomatized fistula exposed.   5. Apply second 2" adapt ring circumferentially around stoma over tegarderm.  6. Apply one piece drainable Colostomy pouch cut to 1 1/4" opening. Massage well.  7. Change one a week and prn.  Patient given supplies for 2 pouch changes with recommendation to order one piece pouches from [employer] (supply company already in use). Given Ostomy service line contact information for questions regarding pouching of stomatized fistula and Colostomy. Educated patient, all questions and concerns addressed.

## 2017-06-23 NOTE — ED ADULT NURSE NOTE - OBJECTIVE STATEMENT
pt received to Rm 7 with c/o of right abdominal pain (5 out of 10) r/t surgical wound for ovarian cancer x 1 years. as per pt, there is stool in the wound. pt has a infuser port in right subclavian and is receiving chemo through there at [facility]. Pt also has an ostomy bag on the left abdomen. Pt is AxOx4, denies of chest pain/SOB/N/V/D. Pt received surgical consult, lab are sent, VS are as noted. 20G IV placed to right AC. NSR on monitor. will continue to observe pt received to Rm 7 with c/o of right abdominal pain (5 out of 10) r/t surgical wound for ovarian cancer x 1 years. as per pt, there is stool in the wound. pt has a infusaport in right subclavian and is receiving chemo through there at [facility]. Pt also has an ostomy bag on the left abdomen. Pt is AxOx4, denies of chest pain/SOB/N/V/D. Pt received surgical consult, lab are sent, VS are as noted. 20G IV placed to right AC. NSR on monitor. will continue to observe

## 2017-06-23 NOTE — ED PROVIDER NOTE - MEDICAL DECISION MAKING DETAILS
68 yo F here for stool at abdominal wound site. follows with dr. held. spoke with surgery and called held office to page. PLAN: labs, imaging, surg consult. 68 yo F here for stool at abdominal wound site. follows with dr. held. spoke with surgery and called held office to page. PLAN: labs, surg consult.

## 2017-06-23 NOTE — ED PROVIDER NOTE - CHPI ED SYMPTOMS NEG
no dysuria/no fever/no diarrhea/no hematuria/no burning urination/no blood in stool/no abdominal distension/no vomiting/no chills/no nausea

## 2017-06-23 NOTE — ED PROVIDER NOTE - PSH
H/O abdominal hysterectomy  removal of ovaries, tubes ,omentum,radical debulking  H/O skin graft  to abdominal wound (11/2016)  History of conization of cervix  1984  S/P cholecystectomy  2011  Status post Reymundo procedure  April 2016  Uterus disorder  removed in 2003, due to fibroids

## 2017-06-23 NOTE — ED ADULT TRIAGE NOTE - CHIEF COMPLAINT QUOTE
States she has an abdominal wound and was told by her home care nurse that a fistula is forming because she saw stool in the wound. Co abdominal pain. Took oxycodone x 30 minutes ago.

## 2017-06-24 LAB — SPECIMEN SOURCE: SIGNIFICANT CHANGE UP

## 2017-06-24 NOTE — CONSULT NOTE ADULT - SUBJECTIVE AND OBJECTIVE BOX
67F complicated medical/surgical history came to ED after visiting nurse noted formed stool at midline abdominal wound. Patient is s/p Reymundo's and reports otherwise stooling normally. Denies fevers, nausea/vomiting, decreased PO intake. Denies pain.   Patient's PMH/PSH significant for ovarian CA s/p BSO, debulking, colonic perforation s/p Reymundo's procedure, chronic midline wound healing issues s/p skin graft, PRABHU 2003 for fibroids  On exam the patient was afebrile with stable vital signs. She was comfortable appearing and in no acute distress. Her breathing was nonlabored. Her abdomen was soft, NT/ND. Colostomy pink/viable with gas and some stool in bag. Midline wound appeared to have granulation tissue, large fistula at midline with formed stool.   Laboratory results were unremarkable. Imaging was not obtained nor indicated at this time.                           9.4    8.47  )-----------( 239      ( 23 Jun 2017 12:55 )             30.2   06-23    138  |  96<L>  |  22  ----------------------------<  94  4.4   |  27  |  0.94    Ca    9.4      23 Jun 2017 12:55    TPro  7.4  /  Alb  3.7  /  TBili  0.5  /  DBili  x   /  AST  17  /  ALT  11  /  AlkPhos  69  06-23

## 2017-06-24 NOTE — CONSULT NOTE ADULT - ASSESSMENT
67F new ECF  - Wound care (ostomy nurse) consult for placement of ostomy appliance over ECF within midline abdominal wound  - F/u with Dr. Zhou as outpatient  - Care and dispo per ED

## 2017-06-28 ENCOUNTER — APPOINTMENT (OUTPATIENT)
Dept: SURGERY | Facility: CLINIC | Age: 67
End: 2017-06-28

## 2017-06-28 ENCOUNTER — APPOINTMENT (OUTPATIENT)
Dept: UROLOGY | Facility: CLINIC | Age: 67
End: 2017-06-28

## 2017-06-28 VITALS
DIASTOLIC BLOOD PRESSURE: 68 MMHG | TEMPERATURE: 98.5 F | BODY MASS INDEX: 19.29 KG/M2 | HEART RATE: 76 BPM | SYSTOLIC BLOOD PRESSURE: 110 MMHG | WEIGHT: 113 LBS | HEIGHT: 64 IN

## 2017-06-28 VITALS — HEART RATE: 68 BPM | DIASTOLIC BLOOD PRESSURE: 68 MMHG | SYSTOLIC BLOOD PRESSURE: 112 MMHG

## 2017-06-28 LAB — BACTERIA BLD CULT: SIGNIFICANT CHANGE UP

## 2017-06-28 RX ORDER — AMOXICILLIN AND CLAVULANATE POTASSIUM 875; 125 MG/1; MG/1
875-125 TABLET, COATED ORAL
Qty: 20 | Refills: 0 | Status: DISCONTINUED | COMMUNITY
Start: 2017-02-02 | End: 2017-06-28

## 2017-06-28 RX ORDER — CLARITHROMYCIN 500 MG/1
500 TABLET, FILM COATED ORAL
Qty: 14 | Refills: 0 | Status: DISCONTINUED | COMMUNITY
Start: 2017-02-13 | End: 2017-06-28

## 2017-06-29 ENCOUNTER — OUTPATIENT (OUTPATIENT)
Dept: OUTPATIENT SERVICES | Facility: HOSPITAL | Age: 67
LOS: 1 days | End: 2017-06-29
Payer: MEDICARE

## 2017-06-29 ENCOUNTER — APPOINTMENT (OUTPATIENT)
Dept: CT IMAGING | Facility: CLINIC | Age: 67
End: 2017-06-29

## 2017-06-29 DIAGNOSIS — Z90.49 ACQUIRED ABSENCE OF OTHER SPECIFIED PARTS OF DIGESTIVE TRACT: Chronic | ICD-10-CM

## 2017-06-29 DIAGNOSIS — Z93.3 COLOSTOMY STATUS: Chronic | ICD-10-CM

## 2017-06-29 DIAGNOSIS — Z00.8 ENCOUNTER FOR OTHER GENERAL EXAMINATION: ICD-10-CM

## 2017-06-29 DIAGNOSIS — Z98.890 OTHER SPECIFIED POSTPROCEDURAL STATES: Chronic | ICD-10-CM

## 2017-06-29 DIAGNOSIS — N85.9 NONINFLAMMATORY DISORDER OF UTERUS, UNSPECIFIED: Chronic | ICD-10-CM

## 2017-06-29 DIAGNOSIS — Z90.710 ACQUIRED ABSENCE OF BOTH CERVIX AND UTERUS: Chronic | ICD-10-CM

## 2017-06-29 PROCEDURE — 74178 CT ABD&PLV WO CNTR FLWD CNTR: CPT

## 2017-06-29 PROCEDURE — 82565 ASSAY OF CREATININE: CPT

## 2017-07-03 ENCOUNTER — MESSAGE (OUTPATIENT)
Age: 67
End: 2017-07-03

## 2017-07-24 ENCOUNTER — APPOINTMENT (OUTPATIENT)
Dept: SURGERY | Facility: CLINIC | Age: 67
End: 2017-07-24

## 2017-07-24 VITALS
SYSTOLIC BLOOD PRESSURE: 121 MMHG | TEMPERATURE: 97.8 F | DIASTOLIC BLOOD PRESSURE: 65 MMHG | WEIGHT: 113 LBS | HEIGHT: 64 IN | BODY MASS INDEX: 19.29 KG/M2 | HEART RATE: 59 BPM

## 2017-07-24 DIAGNOSIS — S31.109A UNSPECIFIED OPEN WOUND OF ABDOMINAL WALL, UNSPECIFIED QUADRANT W/OUT PENETRATION INTO PERITONEAL CAVITY, INITIAL ENCOUNTER: ICD-10-CM

## 2017-07-24 RX ORDER — BUPROPION HYDROCHLORIDE 75 MG/1
75 TABLET, FILM COATED ORAL
Refills: 0 | Status: ACTIVE | COMMUNITY

## 2017-07-25 ENCOUNTER — APPOINTMENT (OUTPATIENT)
Dept: GYNECOLOGIC ONCOLOGY | Facility: CLINIC | Age: 67
End: 2017-07-25

## 2017-07-25 VITALS
DIASTOLIC BLOOD PRESSURE: 72 MMHG | SYSTOLIC BLOOD PRESSURE: 125 MMHG | HEIGHT: 64 IN | BODY MASS INDEX: 19.63 KG/M2 | WEIGHT: 115 LBS

## 2017-07-25 RX ORDER — ENOXAPARIN SODIUM 100 MG/ML
80 INJECTION SUBCUTANEOUS
Qty: 2 | Refills: 0 | Status: DISCONTINUED | COMMUNITY
Start: 2017-04-10 | End: 2017-07-25

## 2017-08-01 ENCOUNTER — MESSAGE (OUTPATIENT)
Age: 67
End: 2017-08-01

## 2017-08-01 DIAGNOSIS — R30.0 DYSURIA: ICD-10-CM

## 2017-08-02 ENCOUNTER — APPOINTMENT (OUTPATIENT)
Dept: UROLOGY | Facility: CLINIC | Age: 67
End: 2017-08-02
Payer: MEDICARE

## 2017-08-02 VITALS — SYSTOLIC BLOOD PRESSURE: 99 MMHG | DIASTOLIC BLOOD PRESSURE: 57 MMHG

## 2017-08-02 LAB
APPEARANCE: ABNORMAL
BACTERIA: ABNORMAL
BILIRUBIN URINE: ABNORMAL
BLOOD URINE: ABNORMAL
COLOR: ABNORMAL
GLUCOSE QUALITATIVE U: NORMAL
HYALINE CASTS: 1 /LPF
KETONES URINE: NEGATIVE
LEUKOCYTE ESTERASE URINE: ABNORMAL
MICROSCOPIC-UA: NORMAL
NITRITE URINE: POSITIVE
PH URINE: 5
PROTEIN URINE: 100
RED BLOOD CELLS URINE: 0 /HPF
SPECIFIC GRAVITY URINE: 1.01
SQUAMOUS EPITHELIAL CELLS: 0 /HPF
UROBILINOGEN URINE: 1
WHITE BLOOD CELLS URINE: 118 /HPF

## 2017-08-02 PROCEDURE — 99213 OFFICE O/P EST LOW 20 MIN: CPT

## 2017-08-03 LAB — BACTERIA UR CULT: ABNORMAL

## 2017-08-03 RX ORDER — PHENAZOPYRIDINE 200 MG/1
200 TABLET, FILM COATED ORAL 3 TIMES DAILY
Qty: 21 | Refills: 0 | Status: ACTIVE | COMMUNITY
Start: 2017-08-03 | End: 1900-01-01

## 2017-08-10 ENCOUNTER — MESSAGE (OUTPATIENT)
Age: 67
End: 2017-08-10

## 2017-08-14 LAB — BACTERIA UR CULT: ABNORMAL

## 2017-08-22 LAB — BACTERIA UR CULT: NORMAL

## 2017-08-31 ENCOUNTER — MESSAGE (OUTPATIENT)
Age: 67
End: 2017-08-31

## 2017-09-05 LAB — BACTERIA UR CULT: ABNORMAL

## 2017-09-08 ENCOUNTER — MESSAGE (OUTPATIENT)
Age: 67
End: 2017-09-08

## 2017-09-22 ENCOUNTER — RX RENEWAL (OUTPATIENT)
Age: 67
End: 2017-09-22

## 2017-09-22 LAB — BACTERIA UR CULT: ABNORMAL

## 2017-09-25 ENCOUNTER — RX RENEWAL (OUTPATIENT)
Age: 67
End: 2017-09-25

## 2017-09-25 ENCOUNTER — OTHER (OUTPATIENT)
Age: 67
End: 2017-09-25

## 2017-09-26 ENCOUNTER — RX RENEWAL (OUTPATIENT)
Age: 67
End: 2017-09-26

## 2017-09-26 DIAGNOSIS — F51.02 ADJUSTMENT INSOMNIA: ICD-10-CM

## 2017-09-26 RX ORDER — ZOLPIDEM TARTRATE 10 MG/1
10 TABLET ORAL
Qty: 30 | Refills: 5 | Status: ACTIVE | COMMUNITY
Start: 2017-09-26 | End: 1900-01-01

## 2017-09-27 ENCOUNTER — APPOINTMENT (OUTPATIENT)
Dept: SURGERY | Facility: CLINIC | Age: 67
End: 2017-09-27
Payer: MEDICARE

## 2017-09-27 VITALS
BODY MASS INDEX: 19.63 KG/M2 | WEIGHT: 115 LBS | HEART RATE: 64 BPM | DIASTOLIC BLOOD PRESSURE: 64 MMHG | TEMPERATURE: 98 F | SYSTOLIC BLOOD PRESSURE: 101 MMHG | HEIGHT: 64 IN

## 2017-09-27 PROCEDURE — 99212 OFFICE O/P EST SF 10 MIN: CPT

## 2017-09-27 RX ORDER — ONDANSETRON 8 MG/1
8 TABLET, ORALLY DISINTEGRATING ORAL
Qty: 15 | Refills: 0 | Status: ACTIVE | COMMUNITY
Start: 2016-05-20

## 2017-10-01 ENCOUNTER — INPATIENT (INPATIENT)
Facility: HOSPITAL | Age: 67
LOS: 1 days | Discharge: HOME | End: 2017-10-03
Attending: SURGERY | Admitting: SURGERY
Payer: MEDICARE

## 2017-10-01 VITALS — WEIGHT: 139.99 LBS | HEIGHT: 66 IN

## 2017-10-01 DIAGNOSIS — N85.9 NONINFLAMMATORY DISORDER OF UTERUS, UNSPECIFIED: Chronic | ICD-10-CM

## 2017-10-01 DIAGNOSIS — Z90.49 ACQUIRED ABSENCE OF OTHER SPECIFIED PARTS OF DIGESTIVE TRACT: Chronic | ICD-10-CM

## 2017-10-01 DIAGNOSIS — Z90.710 ACQUIRED ABSENCE OF BOTH CERVIX AND UTERUS: Chronic | ICD-10-CM

## 2017-10-01 DIAGNOSIS — Z98.890 OTHER SPECIFIED POSTPROCEDURAL STATES: Chronic | ICD-10-CM

## 2017-10-01 DIAGNOSIS — K56.5 INTESTINAL ADHESIONS [BANDS] WITH OBSTRUCTION (POSTINFECTION): ICD-10-CM

## 2017-10-01 DIAGNOSIS — R11.2 NAUSEA WITH VOMITING, UNSPECIFIED: ICD-10-CM

## 2017-10-01 DIAGNOSIS — Z93.3 COLOSTOMY STATUS: Chronic | ICD-10-CM

## 2017-10-01 DIAGNOSIS — C56.9 MALIGNANT NEOPLASM OF UNSPECIFIED OVARY: ICD-10-CM

## 2017-10-01 LAB
ALBUMIN SERPL ELPH-MCNC: 3.1 G/DL — LOW (ref 3.3–5)
ALP SERPL-CCNC: 82 U/L — SIGNIFICANT CHANGE UP (ref 40–120)
ALT FLD-CCNC: 14 U/L — SIGNIFICANT CHANGE UP (ref 12–78)
ANION GAP SERPL CALC-SCNC: 7 MMOL/L — SIGNIFICANT CHANGE UP (ref 5–17)
APPEARANCE UR: (no result)
APTT BLD: 30.6 SEC — SIGNIFICANT CHANGE UP (ref 27.5–37.4)
AST SERPL-CCNC: 19 U/L — SIGNIFICANT CHANGE UP (ref 15–37)
BACTERIA # UR AUTO: (no result)
BASOPHILS # BLD AUTO: 0.1 K/UL — SIGNIFICANT CHANGE UP (ref 0–0.2)
BASOPHILS NFR BLD AUTO: 0.6 % — SIGNIFICANT CHANGE UP (ref 0–2)
BILIRUB SERPL-MCNC: 0.4 MG/DL — SIGNIFICANT CHANGE UP (ref 0.2–1.2)
BILIRUB UR-MCNC: NEGATIVE — SIGNIFICANT CHANGE UP
BUN SERPL-MCNC: 18 MG/DL — SIGNIFICANT CHANGE UP (ref 7–23)
CALCIUM SERPL-MCNC: 9.9 MG/DL — SIGNIFICANT CHANGE UP (ref 8.5–10.1)
CHLORIDE SERPL-SCNC: 99 MMOL/L — SIGNIFICANT CHANGE UP (ref 96–108)
CO2 SERPL-SCNC: 29 MMOL/L — SIGNIFICANT CHANGE UP (ref 22–31)
COLOR SPEC: (no result)
CREAT SERPL-MCNC: 0.83 MG/DL — SIGNIFICANT CHANGE UP (ref 0.5–1.3)
DIFF PNL FLD: (no result)
EOSINOPHIL # BLD AUTO: 0 K/UL — SIGNIFICANT CHANGE UP (ref 0–0.5)
EOSINOPHIL NFR BLD AUTO: 0.1 % — SIGNIFICANT CHANGE UP (ref 0–6)
EPI CELLS # UR: SIGNIFICANT CHANGE UP
GLUCOSE SERPL-MCNC: 126 MG/DL — HIGH (ref 70–99)
GLUCOSE UR QL: NEGATIVE MG/DL — SIGNIFICANT CHANGE UP
HCT VFR BLD CALC: 41.2 % — SIGNIFICANT CHANGE UP (ref 34.5–45)
HGB BLD-MCNC: 13.1 G/DL — SIGNIFICANT CHANGE UP (ref 11.5–15.5)
INR BLD: 1.19 RATIO — HIGH (ref 0.88–1.16)
KETONES UR-MCNC: (no result)
LACTATE SERPL-SCNC: 0.9 MMOL/L — SIGNIFICANT CHANGE UP (ref 0.7–2)
LEUKOCYTE ESTERASE UR-ACNC: (no result)
LIDOCAIN IGE QN: 134 U/L — SIGNIFICANT CHANGE UP (ref 73–393)
LYMPHOCYTES # BLD AUTO: 0.7 K/UL — LOW (ref 1–3.3)
LYMPHOCYTES # BLD AUTO: 7 % — LOW (ref 13–44)
MAGNESIUM SERPL-MCNC: 1.2 MG/DL — LOW (ref 1.6–2.6)
MCHC RBC-ENTMCNC: 30.6 PG — SIGNIFICANT CHANGE UP (ref 27–34)
MCHC RBC-ENTMCNC: 31.9 GM/DL — LOW (ref 32–36)
MCV RBC AUTO: 95.8 FL — SIGNIFICANT CHANGE UP (ref 80–100)
MONOCYTES # BLD AUTO: 0.4 K/UL — SIGNIFICANT CHANGE UP (ref 0–0.9)
MONOCYTES NFR BLD AUTO: 4.2 % — SIGNIFICANT CHANGE UP (ref 2–14)
NEUTROPHILS # BLD AUTO: 8.6 K/UL — HIGH (ref 1.8–7.4)
NEUTROPHILS NFR BLD AUTO: 88.1 % — HIGH (ref 43–77)
NITRITE UR-MCNC: POSITIVE
PH UR: 5 — SIGNIFICANT CHANGE UP (ref 5–8)
PLATELET # BLD AUTO: 306 K/UL — SIGNIFICANT CHANGE UP (ref 150–400)
POTASSIUM SERPL-MCNC: 3.8 MMOL/L — SIGNIFICANT CHANGE UP (ref 3.5–5.3)
POTASSIUM SERPL-SCNC: 3.8 MMOL/L — SIGNIFICANT CHANGE UP (ref 3.5–5.3)
PROT SERPL-MCNC: 7.7 GM/DL — SIGNIFICANT CHANGE UP (ref 6–8.3)
PROT UR-MCNC: 100 MG/DL
PROTHROM AB SERPL-ACNC: 12.9 SEC — HIGH (ref 9.8–12.7)
RBC # BLD: 4.3 M/UL — SIGNIFICANT CHANGE UP (ref 3.8–5.2)
RBC # FLD: 15.5 % — HIGH (ref 10.3–14.5)
RBC CASTS # UR COMP ASSIST: >50 /HPF (ref 0–4)
SODIUM SERPL-SCNC: 135 MMOL/L — SIGNIFICANT CHANGE UP (ref 135–145)
SP GR SPEC: 1.01 — SIGNIFICANT CHANGE UP (ref 1.01–1.02)
TROPONIN I SERPL-MCNC: <0.015 NG/ML — SIGNIFICANT CHANGE UP (ref 0.01–0.04)
UROBILINOGEN FLD QL: NEGATIVE MG/DL — SIGNIFICANT CHANGE UP
WBC # BLD: 9.7 K/UL — SIGNIFICANT CHANGE UP (ref 3.8–10.5)
WBC # FLD AUTO: 9.7 K/UL — SIGNIFICANT CHANGE UP (ref 3.8–10.5)
WBC UR QL: >50

## 2017-10-01 PROCEDURE — 74177 CT ABD & PELVIS W/CONTRAST: CPT | Mod: 26

## 2017-10-01 PROCEDURE — 99285 EMERGENCY DEPT VISIT HI MDM: CPT

## 2017-10-01 RX ORDER — MORPHINE SULFATE 50 MG/1
8 CAPSULE, EXTENDED RELEASE ORAL ONCE
Qty: 0 | Refills: 0 | Status: DISCONTINUED | OUTPATIENT
Start: 2017-10-01 | End: 2017-10-01

## 2017-10-01 RX ORDER — DIPHENHYDRAMINE HCL 50 MG
25 CAPSULE ORAL AT BEDTIME
Qty: 0 | Refills: 0 | Status: DISCONTINUED | OUTPATIENT
Start: 2017-10-01 | End: 2017-10-03

## 2017-10-01 RX ORDER — HYDROMORPHONE HYDROCHLORIDE 2 MG/ML
1 INJECTION INTRAMUSCULAR; INTRAVENOUS; SUBCUTANEOUS ONCE
Qty: 0 | Refills: 0 | Status: DISCONTINUED | OUTPATIENT
Start: 2017-10-01 | End: 2017-10-01

## 2017-10-01 RX ORDER — MAGNESIUM SULFATE 500 MG/ML
2 VIAL (ML) INJECTION ONCE
Qty: 0 | Refills: 0 | Status: COMPLETED | OUTPATIENT
Start: 2017-10-01 | End: 2017-10-01

## 2017-10-01 RX ORDER — CARVEDILOL PHOSPHATE 80 MG/1
6.25 CAPSULE, EXTENDED RELEASE ORAL
Qty: 0 | Refills: 0 | Status: DISCONTINUED | OUTPATIENT
Start: 2017-10-01 | End: 2017-10-03

## 2017-10-01 RX ORDER — CIPROFLOXACIN LACTATE 400MG/40ML
400 VIAL (ML) INTRAVENOUS EVERY 12 HOURS
Qty: 0 | Refills: 0 | Status: DISCONTINUED | OUTPATIENT
Start: 2017-10-01 | End: 2017-10-02

## 2017-10-01 RX ORDER — SIMVASTATIN 20 MG/1
10 TABLET, FILM COATED ORAL AT BEDTIME
Qty: 0 | Refills: 0 | Status: DISCONTINUED | OUTPATIENT
Start: 2017-10-01 | End: 2017-10-03

## 2017-10-01 RX ORDER — ZOLPIDEM TARTRATE 10 MG/1
10 TABLET ORAL AT BEDTIME
Qty: 0 | Refills: 0 | Status: DISCONTINUED | OUTPATIENT
Start: 2017-10-01 | End: 2017-10-03

## 2017-10-01 RX ORDER — MICONAZOLE NITRATE 2 %
1 CREAM (GRAM) TOPICAL
Qty: 0 | Refills: 0 | Status: DISCONTINUED | OUTPATIENT
Start: 2017-10-01 | End: 2017-10-03

## 2017-10-01 RX ORDER — PANTOPRAZOLE SODIUM 20 MG/1
40 TABLET, DELAYED RELEASE ORAL DAILY
Qty: 0 | Refills: 0 | Status: DISCONTINUED | OUTPATIENT
Start: 2017-10-01 | End: 2017-10-03

## 2017-10-01 RX ORDER — MORPHINE SULFATE 50 MG/1
2 CAPSULE, EXTENDED RELEASE ORAL EVERY 6 HOURS
Qty: 0 | Refills: 0 | Status: DISCONTINUED | OUTPATIENT
Start: 2017-10-01 | End: 2017-10-03

## 2017-10-01 RX ORDER — METRONIDAZOLE 500 MG
500 TABLET ORAL ONCE
Qty: 0 | Refills: 0 | Status: COMPLETED | OUTPATIENT
Start: 2017-10-01 | End: 2017-10-01

## 2017-10-01 RX ORDER — ONDANSETRON 8 MG/1
4 TABLET, FILM COATED ORAL ONCE
Qty: 0 | Refills: 0 | Status: COMPLETED | OUTPATIENT
Start: 2017-10-01 | End: 2017-10-01

## 2017-10-01 RX ORDER — ONDANSETRON 8 MG/1
4 TABLET, FILM COATED ORAL EVERY 6 HOURS
Qty: 0 | Refills: 0 | Status: COMPLETED | OUTPATIENT
Start: 2017-10-01 | End: 2017-10-01

## 2017-10-01 RX ORDER — PANTOPRAZOLE SODIUM 20 MG/1
1 TABLET, DELAYED RELEASE ORAL
Qty: 0 | Refills: 0 | COMMUNITY

## 2017-10-01 RX ORDER — RIVAROXABAN 15 MG-20MG
1 KIT ORAL
Qty: 0 | Refills: 0 | COMMUNITY

## 2017-10-01 RX ORDER — ENOXAPARIN SODIUM 100 MG/ML
40 INJECTION SUBCUTANEOUS DAILY
Qty: 0 | Refills: 0 | Status: DISCONTINUED | OUTPATIENT
Start: 2017-10-01 | End: 2017-10-02

## 2017-10-01 RX ORDER — DEXTROSE MONOHYDRATE, SODIUM CHLORIDE, AND POTASSIUM CHLORIDE 50; .745; 4.5 G/1000ML; G/1000ML; G/1000ML
1000 INJECTION, SOLUTION INTRAVENOUS
Qty: 0 | Refills: 0 | Status: DISCONTINUED | OUTPATIENT
Start: 2017-10-01 | End: 2017-10-03

## 2017-10-01 RX ORDER — MORPHINE SULFATE 50 MG/1
4 CAPSULE, EXTENDED RELEASE ORAL EVERY 4 HOURS
Qty: 0 | Refills: 0 | Status: DISCONTINUED | OUTPATIENT
Start: 2017-10-01 | End: 2017-10-03

## 2017-10-01 RX ORDER — CLONAZEPAM 1 MG
0.5 TABLET ORAL
Qty: 0 | Refills: 0 | Status: DISCONTINUED | OUTPATIENT
Start: 2017-10-01 | End: 2017-10-03

## 2017-10-01 RX ORDER — CARVEDILOL PHOSPHATE 80 MG/1
6.25 CAPSULE, EXTENDED RELEASE ORAL
Qty: 0 | Refills: 0 | Status: DISCONTINUED | OUTPATIENT
Start: 2017-10-01 | End: 2017-10-01

## 2017-10-01 RX ADMIN — DEXTROSE MONOHYDRATE, SODIUM CHLORIDE, AND POTASSIUM CHLORIDE 125 MILLILITER(S): 50; .745; 4.5 INJECTION, SOLUTION INTRAVENOUS at 23:19

## 2017-10-01 RX ADMIN — ONDANSETRON 4 MILLIGRAM(S): 8 TABLET, FILM COATED ORAL at 22:57

## 2017-10-01 RX ADMIN — Medication 100 MILLIGRAM(S): at 22:00

## 2017-10-01 RX ADMIN — MORPHINE SULFATE 4 MILLIGRAM(S): 50 CAPSULE, EXTENDED RELEASE ORAL at 23:43

## 2017-10-01 RX ADMIN — HYDROMORPHONE HYDROCHLORIDE 1 MILLIGRAM(S): 2 INJECTION INTRAMUSCULAR; INTRAVENOUS; SUBCUTANEOUS at 18:19

## 2017-10-01 RX ADMIN — Medication 50 GRAM(S): at 18:20

## 2017-10-01 RX ADMIN — CARVEDILOL PHOSPHATE 6.25 MILLIGRAM(S): 80 CAPSULE, EXTENDED RELEASE ORAL at 23:43

## 2017-10-01 RX ADMIN — SIMVASTATIN 10 MILLIGRAM(S): 20 TABLET, FILM COATED ORAL at 23:47

## 2017-10-01 RX ADMIN — Medication 200 MILLIGRAM(S): at 19:55

## 2017-10-01 RX ADMIN — MORPHINE SULFATE 8 MILLIGRAM(S): 50 CAPSULE, EXTENDED RELEASE ORAL at 14:50

## 2017-10-01 RX ADMIN — HYDROMORPHONE HYDROCHLORIDE 1 MILLIGRAM(S): 2 INJECTION INTRAMUSCULAR; INTRAVENOUS; SUBCUTANEOUS at 18:49

## 2017-10-01 RX ADMIN — MORPHINE SULFATE 8 MILLIGRAM(S): 50 CAPSULE, EXTENDED RELEASE ORAL at 15:20

## 2017-10-01 RX ADMIN — ONDANSETRON 104 MILLIGRAM(S): 8 TABLET, FILM COATED ORAL at 14:50

## 2017-10-01 RX ADMIN — ENOXAPARIN SODIUM 40 MILLIGRAM(S): 100 INJECTION SUBCUTANEOUS at 23:43

## 2017-10-01 RX ADMIN — Medication 50 GRAM(S): at 23:20

## 2017-10-01 NOTE — ED ADULT NURSE NOTE - OBJECTIVE STATEMENT
Pt being treated for UTI, med was changed on FRI and pt developed abdominal pain. Pt states she is receiving chemo currently. Last tx 4 weeks ago, due for next tx tmw.

## 2017-10-01 NOTE — ED PROVIDER NOTE - PROGRESS NOTE DETAILS
STEFFANIE Oviedo have attested this document for and under the supervision of Dr. Burden Lazara DG: d/w surgery Dr. Huddleston who will see pt in ED and discuss further plan

## 2017-10-01 NOTE — CONSULT NOTE ADULT - PROBLEM SELECTOR RECOMMENDATION 2
NGT decompression  Patients surgeon is Dr Zhou at [facility] if the situation for surgery is required.  Will monitor and follow

## 2017-10-01 NOTE — ED PROVIDER NOTE - NS_ ATTENDINGSCRIBEDETAILS _ED_A_ED_FT
I, Hayes Starr MD,  performed the initial face to face bedside interview with this patient regarding history of present illness, review of symptoms and relevant past medical, social and family history.  I completed an independent physical examination.    The history, relevant review of systems, past medical and surgical history, medical decision making, and physical examination was documented by the scribe in my presence and I attest to the accuracy of the documentation.

## 2017-10-01 NOTE — ED ADULT NURSE REASSESSMENT NOTE - NS ED NURSE REASSESS COMMENT FT1
4mg morphine administered IV per verbal order prior to NG tube placement. Pt in distress requesting for pain meds.

## 2017-10-01 NOTE — ED ADULT TRIAGE NOTE - CHIEF COMPLAINT QUOTE
pt w/ hx of ovarian CA and ostomy, states decreased output from ostomy and severe abd pain constipation and nausea.

## 2017-10-01 NOTE — ED PROVIDER NOTE - OBJECTIVE STATEMENT
68 y/o F with hx of 2 colostomies done by dr. shepherd at [facility], Ovarian CA, debulking by Braxton Diaz, presents to ED for evaluation of sudden onset severe abd pain. +n/v. No other complaints. Pt denies fever, chills, HA, SOB, CP.

## 2017-10-02 LAB
ANION GAP SERPL CALC-SCNC: 7 MMOL/L — SIGNIFICANT CHANGE UP (ref 5–17)
BUN SERPL-MCNC: 15 MG/DL — SIGNIFICANT CHANGE UP (ref 7–23)
CALCIUM SERPL-MCNC: 8.8 MG/DL — SIGNIFICANT CHANGE UP (ref 8.5–10.1)
CHLORIDE SERPL-SCNC: 96 MMOL/L — SIGNIFICANT CHANGE UP (ref 96–108)
CO2 SERPL-SCNC: 30 MMOL/L — SIGNIFICANT CHANGE UP (ref 22–31)
CREAT SERPL-MCNC: 1.03 MG/DL — SIGNIFICANT CHANGE UP (ref 0.5–1.3)
GLUCOSE SERPL-MCNC: 160 MG/DL — HIGH (ref 70–99)
HCT VFR BLD CALC: 33.6 % — LOW (ref 34.5–45)
HGB BLD-MCNC: 11 G/DL — LOW (ref 11.5–15.5)
MAGNESIUM SERPL-MCNC: 2.3 MG/DL — SIGNIFICANT CHANGE UP (ref 1.6–2.6)
MCHC RBC-ENTMCNC: 31.5 PG — SIGNIFICANT CHANGE UP (ref 27–34)
MCHC RBC-ENTMCNC: 32.8 GM/DL — SIGNIFICANT CHANGE UP (ref 32–36)
MCV RBC AUTO: 95.9 FL — SIGNIFICANT CHANGE UP (ref 80–100)
PHOSPHATE SERPL-MCNC: 2.6 MG/DL — SIGNIFICANT CHANGE UP (ref 2.5–4.5)
PLATELET # BLD AUTO: 255 K/UL — SIGNIFICANT CHANGE UP (ref 150–400)
POTASSIUM SERPL-MCNC: 3.9 MMOL/L — SIGNIFICANT CHANGE UP (ref 3.5–5.3)
POTASSIUM SERPL-SCNC: 3.9 MMOL/L — SIGNIFICANT CHANGE UP (ref 3.5–5.3)
RBC # BLD: 3.51 M/UL — LOW (ref 3.8–5.2)
RBC # FLD: 15.5 % — HIGH (ref 10.3–14.5)
SODIUM SERPL-SCNC: 133 MMOL/L — LOW (ref 135–145)
WBC # BLD: 9.7 K/UL — SIGNIFICANT CHANGE UP (ref 3.8–10.5)
WBC # FLD AUTO: 9.7 K/UL — SIGNIFICANT CHANGE UP (ref 3.8–10.5)

## 2017-10-02 PROCEDURE — 93010 ELECTROCARDIOGRAM REPORT: CPT

## 2017-10-02 PROCEDURE — 93970 EXTREMITY STUDY: CPT | Mod: 26

## 2017-10-02 RX ORDER — BENZOCAINE AND MENTHOL 5; 1 G/100ML; G/100ML
1 LIQUID ORAL
Qty: 0 | Refills: 0 | Status: DISCONTINUED | OUTPATIENT
Start: 2017-10-02 | End: 2017-10-03

## 2017-10-02 RX ORDER — PIPERACILLIN AND TAZOBACTAM 4; .5 G/20ML; G/20ML
3.38 INJECTION, POWDER, LYOPHILIZED, FOR SOLUTION INTRAVENOUS EVERY 8 HOURS
Qty: 0 | Refills: 0 | Status: DISCONTINUED | OUTPATIENT
Start: 2017-10-02 | End: 2017-10-03

## 2017-10-02 RX ORDER — BENZOCAINE 10 %
1 GEL (GRAM) MUCOUS MEMBRANE
Qty: 0 | Refills: 0 | Status: DISCONTINUED | OUTPATIENT
Start: 2017-10-02 | End: 2017-10-03

## 2017-10-02 RX ORDER — ENOXAPARIN SODIUM 100 MG/ML
50 INJECTION SUBCUTANEOUS EVERY 12 HOURS
Qty: 0 | Refills: 0 | Status: DISCONTINUED | OUTPATIENT
Start: 2017-10-02 | End: 2017-10-03

## 2017-10-02 RX ADMIN — ENOXAPARIN SODIUM 50 MILLIGRAM(S): 100 INJECTION SUBCUTANEOUS at 18:02

## 2017-10-02 RX ADMIN — CARVEDILOL PHOSPHATE 6.25 MILLIGRAM(S): 80 CAPSULE, EXTENDED RELEASE ORAL at 18:01

## 2017-10-02 RX ADMIN — CARVEDILOL PHOSPHATE 6.25 MILLIGRAM(S): 80 CAPSULE, EXTENDED RELEASE ORAL at 05:43

## 2017-10-02 RX ADMIN — MORPHINE SULFATE 4 MILLIGRAM(S): 50 CAPSULE, EXTENDED RELEASE ORAL at 00:13

## 2017-10-02 RX ADMIN — Medication 0.5 MILLIGRAM(S): at 21:50

## 2017-10-02 RX ADMIN — PIPERACILLIN AND TAZOBACTAM 25 GRAM(S): 4; .5 INJECTION, POWDER, LYOPHILIZED, FOR SOLUTION INTRAVENOUS at 09:58

## 2017-10-02 RX ADMIN — BENZOCAINE AND MENTHOL 1 LOZENGE: 5; 1 LIQUID ORAL at 05:43

## 2017-10-02 RX ADMIN — ZOLPIDEM TARTRATE 10 MILLIGRAM(S): 10 TABLET ORAL at 00:09

## 2017-10-02 RX ADMIN — MORPHINE SULFATE 4 MILLIGRAM(S): 50 CAPSULE, EXTENDED RELEASE ORAL at 08:12

## 2017-10-02 RX ADMIN — PANTOPRAZOLE SODIUM 40 MILLIGRAM(S): 20 TABLET, DELAYED RELEASE ORAL at 05:43

## 2017-10-02 RX ADMIN — Medication 1 APPLICATION(S): at 18:02

## 2017-10-02 RX ADMIN — Medication 1 SPRAY(S): at 14:52

## 2017-10-02 RX ADMIN — MORPHINE SULFATE 4 MILLIGRAM(S): 50 CAPSULE, EXTENDED RELEASE ORAL at 04:27

## 2017-10-02 RX ADMIN — Medication 1 APPLICATION(S): at 06:28

## 2017-10-02 RX ADMIN — MORPHINE SULFATE 4 MILLIGRAM(S): 50 CAPSULE, EXTENDED RELEASE ORAL at 03:57

## 2017-10-02 RX ADMIN — BENZOCAINE AND MENTHOL 1 LOZENGE: 5; 1 LIQUID ORAL at 01:21

## 2017-10-02 RX ADMIN — PIPERACILLIN AND TAZOBACTAM 25 GRAM(S): 4; .5 INJECTION, POWDER, LYOPHILIZED, FOR SOLUTION INTRAVENOUS at 21:50

## 2017-10-02 RX ADMIN — PIPERACILLIN AND TAZOBACTAM 25 GRAM(S): 4; .5 INJECTION, POWDER, LYOPHILIZED, FOR SOLUTION INTRAVENOUS at 14:52

## 2017-10-02 RX ADMIN — SIMVASTATIN 10 MILLIGRAM(S): 20 TABLET, FILM COATED ORAL at 21:50

## 2017-10-02 RX ADMIN — PIPERACILLIN AND TAZOBACTAM 25 GRAM(S): 4; .5 INJECTION, POWDER, LYOPHILIZED, FOR SOLUTION INTRAVENOUS at 01:21

## 2017-10-02 NOTE — PROGRESS NOTE ADULT - ASSESSMENT
67 Y old female with Ca colon, on chemo, with SBO    IV hydration  DVT prophylaxis  Oncology consult  Await GI function  NGT  Medical consult  May need transfer to primary surgeon. 67 Y old female with ovarian CA , on chemo, with SBO    IV hydration  DVT prophylaxis  Oncology consult  Await GI function  NGT  Medical consult  May need transfer to primary surgeon.

## 2017-10-02 NOTE — CONSULT NOTE ADULT - ASSESSMENT
Patient is 67 year old female with past medical history hyperlipidemia, ovarian cancer s/p PRABHU, colectomy s/p colostomy now admitted on 10/1 for evaluation of nausea, vomiting. Patient recently started a new medication for bladder spasms and also was on po cipro for uti;  she  became constipated, upon admission imaging revealed SBO. Currently has ngt in place. Notes improvement in abdominal pain however, no output through ostomy. Also complains of dysuria.  1. Patient admitted with SBO, found to have urinary tract infection  - review of labwork, from 9/17 there is outpatient urine culture growing Klebsiella pneumoniae which is sensitive to zosyn  - will continue zosyn for now  - follow up cultures   - iv hydration and supportive care   - serial cbc and monitor temperature   - ngt in progress  - diet being held  - ostomy care  Will follow

## 2017-10-02 NOTE — CONSULT NOTE ADULT - ASSESSMENT
68 yo F with PMH of recurrent UTIs, ovarian cancer, HTN, h/o PE (on xarelto), dyslipidemia, h/o colonic perforation s/p colostomy, p/w abdominal pain    *High grade SBO  -Management as per primary surgery team    *UTI   -No sepsis  -Cystitis vs fistula on CT - consider urology consult  -Zosyn  -Blood cultures  -Urine cx     *Questionable pelvic fluid collection vs fluid within vaginal cuff vs loop of pelvic small bowel on CT  -Management as per surgery    *HTN  -On coreg at home  -Uncontrolled in systolic 160s likely 2/2 pain.  -After pain subsides, if patient continues to have elevated BP, consider adding norvasc 5mg PO daily  -Low salt diet     *Kidney cyst  -Outpatient f/u    *Hypomagnesemia  -Replete and recheck in AM    *Isolated episode of bradycardia in 40s  -Repeat HR was in the 80s  -Will monitor HR on coreg  -Asymptomatic  -Will order EKG    *H/o PE  -Patient was on xarelto at home, but may need surgery for SBO. If patient is not having surgery imminently, should resume xarelto ASAP 68 yo F with PMH of recurrent UTIs, ovarian cancer, HTN, h/o PE (on xarelto), dyslipidemia, h/o colonic perforation s/p colostomy, p/w abdominal pain    *High grade SBO  -Management as per primary surgery team    *UTI   -No sepsis  -Cystitis vs fistula on CT - consider urology consult  -Given that patient failed cipro, will start Zosyn instead  -Blood cultures  -Urine cx     *Questionable pelvic fluid collection vs fluid within vaginal cuff vs loop of pelvic small bowel on CT  -Management as per surgery    *HTN  -On coreg at home  -Uncontrolled in systolic 160s likely 2/2 pain.  -After pain subsides, if patient continues to have elevated BP, consider adding norvasc 5mg PO daily  -Low salt diet     *Kidney cyst  -Outpatient f/u    *Hypomagnesemia  -Replete and recheck in AM    *Isolated episode of bradycardia in 40s  -Repeat HR was in the 80s  -Will monitor HR on coreg  -Asymptomatic  -Will order EKG    *H/o PE  -Patient was on xarelto at home, but may need surgery for SBO. If patient is not having surgery imminently, should resume xarelto ASAP

## 2017-10-02 NOTE — CONSULT NOTE ADULT - SUBJECTIVE AND OBJECTIVE BOX
PCP: Marco Antonio  Onc: Enrique ([facility])  GI: Fried     66 yo F with PMH of recurrent UTIs, ovarian cancer, HTN, h/o PE (on xarelto), dyslipidemia, h/o colonic perforation s/p colostomy, p/w abdominal pain. Found to have high grade SBO. Hospitalist consulted for BP management. Patient states she does continue to have some abdominal pain when the morphine wears off.     Patient states she was a h/o recurrent UTIs, and was recently on a course of cipro and completed it. Patient continues to have strongly positive UA for UTI during hospitalization with dysuria.     PSH: ovarian CA s/p BSO, debulking, hysterectomy, colonic perforation s/p Reymundo's procedure, chronic midline wound healing issues s/p skin graft, PRABHU 2003 for fibroids, cholecystectomy    Social Hx: Denies tobacco, drinks 1 glass of wine daily, denies drugs, lives at home with     Family Hx: Denies
Patient is a 67y old  Female who presents with a chief complaint of SBO (01 Oct 2017 21:01)    HPI:  Patient is 67 year old female with past medical history hyperlipidemia, ovarian cancer s/p PRABHU, colectomy s/p colostomy now admitted on 10/1 for evaluation of nausea, vomiting. Patient recently started a new medication for bladder spasms and also was on po cipro for uti;  she  became constipated, upon admission imaging revealed SBO. Currently has ngt in place. Notes improvement in abdominal pain however, no output through ostomy. Also complains of dysuria.      PMH: as above  PSH: as above  Meds: per reconcilation sheet, noted below  MEDICATIONS  (STANDING):  dextrose 5% + sodium chloride 0.45% with potassium chloride 20 mEq/L 1000 milliLiter(s) (125 mL/Hr) IV Continuous <Continuous>  simvastatin 10 milliGRAM(s) Oral at bedtime  miconazole 2% Cream 1 Application(s) Topical two times a day  pantoprazole  Injectable 40 milliGRAM(s) IV Push daily  carvedilol 6.25 milliGRAM(s) Oral two times a day  piperacillin/tazobactam IVPB. 3.375 Gram(s) IV Intermittent every 8 hours  enoxaparin Injectable 50 milliGRAM(s) SubCutaneous every 12 hours    MEDICATIONS  (PRN):  morphine  - Injectable 4 milliGRAM(s) IV Push every 4 hours PRN Severe Pain (7 - 10)  morphine  - Injectable 2 milliGRAM(s) IV Push every 6 hours PRN Moderate Pain (4 - 6)  LORazepam   Injectable 0.5 milliGRAM(s) IV Push every 12 hours PRN Anxiety  diphenhydrAMINE   Injectable 25 milliGRAM(s) IV Push at bedtime PRN Insomnia  zolpidem 10 milliGRAM(s) Oral at bedtime PRN Insomnia  clonazePAM Tablet 0.5 milliGRAM(s) Oral two times a day PRN anxiety  benzocaine 15 mG/menthol 3.6 mG Lozenge 1 Lozenge Oral every 3 hours PRN Sore Throat  benzocaine 20% Spray 1 Spray(s) Topical four times a day PRN sore throat    Allergies    acetaminophen (Hives)  Bananas (Unknown)  ibuprofen (Other)  Lexapro (Unknown)  Originally Entered as [Unknown] reaction to [cantelope] (Unknown)  Originally Entered as [Unknown] reaction to [cherries] (Unknown)  Peaches (Unknown)  wallnuts, paches, bananas, honedew and cantalope (Hives; Short breath)  walnut (Stomach Upset (Severe))  Wellbutrin (Hives)    Intolerances      Social: no smoking, no alcohol, no illegal drugs; no recent travel, no exposure to TB  FAMILY HISTORY:  No pertinent family history in first degree relatives  No pertinent family history in first degree relatives    ROS: the patient has no fever, no chills, no HA, no dizziness, no sore throat, no blurry vision, no CP, no palpitations, no SOB, no cough, no diarrhea, no N/V,  no leg pain, no claudication, no rash, no joint aches, no rectal pain or bleeding, no night sweats  Vital Signs Last 24 Hrs  T(C): 36.5 (02 Oct 2017 12:43), Max: 36.8 (01 Oct 2017 21:30)  T(F): 97.7 (02 Oct 2017 12:43), Max: 98.2 (01 Oct 2017 21:30)  HR: 69 (02 Oct 2017 12:43) (69 - 98)  BP: 119/64 (02 Oct 2017 12:43) (108/63 - 166/87)  BP(mean): --  RR: 18 (02 Oct 2017 12:43) (16 - 18)  SpO2: 100% (02 Oct 2017 12:43) (96% - 100%)  Daily Height in cm: 162.56 (01 Oct 2017 23:07)    Daily   Constitutional: frail looking  HEENT: NC/AT, EOMI, PERRLA  Neck: supple  Respiratory: clear, no r/r/w  Cardiovascular: S1S2 regular, no murmurs  Abdomen: soft, ostomy in place, not distended, diminished BS  Genitourinary: deferred  Rectal: deferred  Musculoskeletal: no muscle tenderness, no joint swelling or tenderness  Neurological: AxOx3, moving all extremities, no focal deficits  Skin: no rashes                          11.0   9.7   )-----------( 255      ( 02 Oct 2017 06:37 )             33.6     10-02    133<L>  |  96  |  15  ----------------------------<  160<H>  3.9   |  30  |  1.03    Ca    8.8      02 Oct 2017 06:37  Phos  2.6     10-  Mg     2.3     10-    TPro  7.7  /  Alb  3.1<L>  /  TBili  0.4  /  DBili  x   /  AST  19  /  ALT  14  /  AlkPhos  82  10-     LIVER FUNCTIONS - ( 01 Oct 2017 14:44 )  Alb: 3.1 g/dL / Pro: 7.7 gm/dL / ALK PHOS: 82 U/L / ALT: 14 U/L / AST: 19 U/L / GGT: x           Urinalysis Basic - ( 01 Oct 2017 22:10 )    Color: Green / Appearance: x / S.010 / pH: x  Gluc: x / Ketone: Trace  / Bili: Negative / Urobili: Negative mg/dL   Blood: x / Protein: 100 mg/dL / Nitrite: Positive   Leuk Esterase: Moderate / RBC: >50 /HPF / WBC >50   Sq Epi: x / Non Sq Epi: Few / Bacteria: Moderate          Radiology:< from: CT Abdomen and Pelvis w/ Oral Cont and w/ IV Cont (10.01.17 @ 17:45) >    IMPRESSION:    Status post left-sided hemicolectomy with a left colostomy with findings   compatible with a high-grade small bowel obstruction with transition zone   in the deep pelvis. Mild enhancement of the proximal small bowel wall   with a short segment of thickening and edema of the small bowel wall and   associated interloop ascites clinically correlate for possible early   ischemic changes versus ileitis. Question pelvic fluid collection versus   fluid within the vaginal cuff versus loop of pelvic small bowel.    Thickening of the bladder wall may represent cystitis versus reactive to   the adjacent inflammatory process. Tiny droplet of air within the bladder   lumen may be secondary to cystitis less likely felt to represent a   enterovesicular fistula.            < end of copied text >      Advanced directive addressed: full resuscitation
· Chief Complaint: The patient is a 67y Female complaining of abdominal pain.	  · HPI Objective Statement: 66 y/o F with hx of 2 colostomies done by dr. zhou at [facility], Ovarian CA, debulking by Braxton Diaz, presents to ED for evaluation of sudden onset severe abd pain. +n/v. No other complaints. Pt denies fever, chills, HA, SOB, CP.	  · Presenting Symptoms: NAUSEA, PAIN, VOMITING	  · Negative Findings: no blood in stool, no burning urination, no chills, no dysuria	  · Location: abdomen	  · Laterality: bilateral	  · Area: lower	  · Radiation: no radiation	  · Timing: sudden onset	  · Duration: hour(s)	  · Severity: SEVERE	  · Recent Exposure To: none known	  · Relieving Factors: none	      PAST MEDICAL/SURGICAL/FAMILY/SOCIAL HISTORY:    Past Medical History:  Essential hypertension, hypertension with unspecified goal    Malignant neoplasm of ovary, unspecified laterality    Other acute pulmonary embolism    Ovarian cancer    Perforation of sigmoid colon  s/p Reymundo procedure 4/2016.     Past Surgical History:  H/O abdominal hysterectomy  removal of ovaries, tubes ,omentum,radical debulking  H/O skin graft  to abdominal wound (11/2016)  History of conization of cervix  1984  S/P cholecystectomy  2011  Status post Reymundo procedure  April 2016  Uterus disorder  removed in 2003, due to fibroids.    Oncology Care- [facility].  Surgical Attending Dr Zhou at [facility]    Vital Signs Last 24 Hrs  T(C): 36.7 (01 Oct 2017 15:56), Max: 36.7 (01 Oct 2017 15:56)  T(F): 98.1 (01 Oct 2017 15:56), Max: 98.1 (01 Oct 2017 15:56)  HR: 98 (01 Oct 2017 18:24) (48 - 98)  BP: 149/82 (01 Oct 2017 18:24) (149/82 - 166/87)  BP(mean): --  RR: 18 (01 Oct 2017 18:24) (18 - 18)  SpO2: 100% (01 Oct 2017 18:24) (100% - 100%)    PE  A and O  NAD, conversive.  Lungs- clear  Heart- RRR  Abd- Distended with diffuse tenderness to deep palpation, -rebound or peritoneal signs, hypoactive BS, ostomies viable without output. Large ventral hernia noted without incarceration.  Ext- -edema      CARDIAC MARKERS ( 01 Oct 2017 14:44 )  <0.015 ng/mL / x     / x     / x     / x                                13.1   9.7   )-----------( 306      ( 01 Oct 2017 14:44 )             41.2     CBC Full  -  ( 01 Oct 2017 14:44 )  WBC Count : 9.7 K/uL  Hemoglobin : 13.1 g/dL  Hematocrit : 41.2 %  Platelet Count - Automated : 306 K/uL  Mean Cell Volume : 95.8 fl  Mean Cell Hemoglobin : 30.6 pg  Mean Cell Hemoglobin Concentration : 31.9 gm/dL  Auto Neutrophil # : 8.6 K/uL  Auto Lymphocyte # : 0.7 K/uL  Auto Monocyte # : 0.4 K/uL  Auto Eosinophil # : 0.0 K/uL  Auto Basophil # : 0.1 K/uL  Auto Neutrophil % : 88.1 %  Auto Lymphocyte % : 7.0 %  Auto Monocyte % : 4.2 %  Auto Eosinophil % : 0.1 %  Auto Basophil % : 0.6 %    10-01    135  |  99  |  18  ----------------------------<  126<H>  3.8   |  29  |  0.83    Ca    9.9      01 Oct 2017 14:44  Mg     1.2     10-01    TPro  7.7  /  Alb  3.1<L>  /  TBili  0.4  /  DBili  x   /  AST  19  /  ALT  14  /  AlkPhos  82  10-01    LIVER FUNCTIONS - ( 01 Oct 2017 14:44 )  Alb: 3.1 g/dL / Pro: 7.7 gm/dL / ALK PHOS: 82 U/L / ALT: 14 U/L / AST: 19 U/L / GGT: x             PT/INR - ( 01 Oct 2017 14:44 )   PT: 12.9 sec;   INR: 1.19 ratio         PTT - ( 01 Oct 2017 14:44 )  PTT:30.6 sec    CT Abd- +SBO transition believed in the deep pelvis, thickened bladder. small ascites.

## 2017-10-03 ENCOUNTER — TRANSCRIPTION ENCOUNTER (OUTPATIENT)
Age: 67
End: 2017-10-03

## 2017-10-03 VITALS
TEMPERATURE: 98 F | RESPIRATION RATE: 16 BRPM | HEART RATE: 68 BPM | SYSTOLIC BLOOD PRESSURE: 140 MMHG | DIASTOLIC BLOOD PRESSURE: 66 MMHG | OXYGEN SATURATION: 99 %

## 2017-10-03 LAB
ANION GAP SERPL CALC-SCNC: 7 MMOL/L — SIGNIFICANT CHANGE UP (ref 5–17)
BUN SERPL-MCNC: 7 MG/DL — SIGNIFICANT CHANGE UP (ref 7–23)
CALCIUM SERPL-MCNC: 8.8 MG/DL — SIGNIFICANT CHANGE UP (ref 8.5–10.1)
CHLORIDE SERPL-SCNC: 102 MMOL/L — SIGNIFICANT CHANGE UP (ref 96–108)
CO2 SERPL-SCNC: 29 MMOL/L — SIGNIFICANT CHANGE UP (ref 22–31)
CREAT SERPL-MCNC: 0.94 MG/DL — SIGNIFICANT CHANGE UP (ref 0.5–1.3)
GLUCOSE SERPL-MCNC: 115 MG/DL — HIGH (ref 70–99)
HCT VFR BLD CALC: 30.7 % — LOW (ref 34.5–45)
HGB BLD-MCNC: 9.9 G/DL — LOW (ref 11.5–15.5)
MCHC RBC-ENTMCNC: 31 PG — SIGNIFICANT CHANGE UP (ref 27–34)
MCHC RBC-ENTMCNC: 32.3 GM/DL — SIGNIFICANT CHANGE UP (ref 32–36)
MCV RBC AUTO: 95.9 FL — SIGNIFICANT CHANGE UP (ref 80–100)
PLATELET # BLD AUTO: 230 K/UL — SIGNIFICANT CHANGE UP (ref 150–400)
POTASSIUM SERPL-MCNC: 3.9 MMOL/L — SIGNIFICANT CHANGE UP (ref 3.5–5.3)
POTASSIUM SERPL-SCNC: 3.9 MMOL/L — SIGNIFICANT CHANGE UP (ref 3.5–5.3)
RBC # BLD: 3.2 M/UL — LOW (ref 3.8–5.2)
RBC # FLD: 15.5 % — HIGH (ref 10.3–14.5)
SODIUM SERPL-SCNC: 138 MMOL/L — SIGNIFICANT CHANGE UP (ref 135–145)
WBC # BLD: 8.3 K/UL — SIGNIFICANT CHANGE UP (ref 3.8–10.5)
WBC # FLD AUTO: 8.3 K/UL — SIGNIFICANT CHANGE UP (ref 3.8–10.5)

## 2017-10-03 RX ORDER — CEFUROXIME AXETIL 250 MG
1 TABLET ORAL
Qty: 10 | Refills: 0 | OUTPATIENT
Start: 2017-10-03 | End: 2017-10-08

## 2017-10-03 RX ORDER — MORPHINE SULFATE 50 MG/1
4 CAPSULE, EXTENDED RELEASE ORAL ONCE
Qty: 0 | Refills: 0 | Status: DISCONTINUED | OUTPATIENT
Start: 2017-10-01 | End: 2017-10-03

## 2017-10-03 RX ADMIN — CARVEDILOL PHOSPHATE 6.25 MILLIGRAM(S): 80 CAPSULE, EXTENDED RELEASE ORAL at 05:39

## 2017-10-03 RX ADMIN — DEXTROSE MONOHYDRATE, SODIUM CHLORIDE, AND POTASSIUM CHLORIDE 125 MILLILITER(S): 50; .745; 4.5 INJECTION, SOLUTION INTRAVENOUS at 05:32

## 2017-10-03 RX ADMIN — ZOLPIDEM TARTRATE 10 MILLIGRAM(S): 10 TABLET ORAL at 01:20

## 2017-10-03 RX ADMIN — PIPERACILLIN AND TAZOBACTAM 25 GRAM(S): 4; .5 INJECTION, POWDER, LYOPHILIZED, FOR SOLUTION INTRAVENOUS at 05:33

## 2017-10-03 RX ADMIN — MORPHINE SULFATE 2 MILLIGRAM(S): 50 CAPSULE, EXTENDED RELEASE ORAL at 07:10

## 2017-10-03 RX ADMIN — ENOXAPARIN SODIUM 50 MILLIGRAM(S): 100 INJECTION SUBCUTANEOUS at 05:34

## 2017-10-03 RX ADMIN — MORPHINE SULFATE 4 MILLIGRAM(S): 50 CAPSULE, EXTENDED RELEASE ORAL at 10:26

## 2017-10-03 RX ADMIN — Medication 1 APPLICATION(S): at 05:48

## 2017-10-03 NOTE — PROGRESS NOTE ADULT - ASSESSMENT
Patient is 67 year old female with past medical history hyperlipidemia, ovarian cancer s/p PRABHU, colectomy s/p colostomy now admitted on 10/1 for evaluation of nausea, vomiting. Patient recently started a new medication for bladder spasms and also was on po cipro for uti;  she  became constipated, upon admission imaging revealed SBO. Currently has ngt in place. Notes improvement in abdominal pain however, no output through ostomy. Also complains of dysuria.  1. Patient admitted with SBO, found to have urinary tract infection  - review of labwork, from 9/17 there is outpatient urine culture growing Klebsiella pneumoniae which is sensitive to zosyn, and cephalosporins  - will discontinue ngt as per surgery approval; if patient tolerates clear diet, may be able to discharge home on ceftin 250 mg po q 12 hours for 5 more days  - follow up cultures   - iv hydration and supportive care   - serial cbc and monitor temperature   - ostomy care  Will follow

## 2017-10-03 NOTE — DISCHARGE NOTE ADULT - SECONDARY DIAGNOSIS.
Malignant neoplasm of ovary, unspecified laterality Essential hypertension, hypertension with unspecified goal

## 2017-10-03 NOTE — DISCHARGE NOTE ADULT - MEDICATION SUMMARY - MEDICATIONS TO TAKE
I will START or STAY ON the medications listed below when I get home from the hospital:    simvastatin 10 mg oral tablet  -- 1 tab(s) by mouth once a day (at bedtime)  -- Indication: For hypertension    carvedilol 6.25 mg oral tablet  -- 1 tab(s) by mouth 2 times a day  -- Indication: For hypertension    miconazole 2% topical cream  -- 1 application on skin 2 times a day  -- Indication: For thrush

## 2017-10-03 NOTE — PROGRESS NOTE ADULT - SUBJECTIVE AND OBJECTIVE BOX
Patient is a 67y old  Female who presents with a chief complaint of SBO (01 Oct 2017 21:01)      Pt seen and examined, Abdominal pain improved, no fever, no nausea. No GI function. ROS otherwise negative.    Vital Signs Last 24 Hrs  T(C): 36.4 (03 Oct 2017 05:49), Max: 36.8 (02 Oct 2017 20:14)  T(F): 97.5 (03 Oct 2017 05:49), Max: 98.3 (02 Oct 2017 20:14)  HR: 70 (03 Oct 2017 05:49) (66 - 70)  BP: 157/62 (03 Oct 2017 05:49) (119/64 - 157/62)  BP(mean): --  RR: 18 (03 Oct 2017 05:49) (18 - 18)  SpO2: 97% (03 Oct 2017 05:49) (97% - 100%)      I&O's Summary    02 Oct 2017 07:01  -  03 Oct 2017 07:00  --------------------------------------------------------  IN: 990 mL / OUT: 450 mL / NET: 540 mL        PHYSICAL EXAM:  Constitutional: NAD  Eyes:  WNL  ENMT:  WNL  Neck:  WNL, non tender  Back: Non tender  Respiratory: CTABL  Cardiovascular:  S1+S2+0  Gastrointestinal: Soft, NT. Distended, Ostomy viable, No function  Genitourinary:  WNL  Extremities: NV intact  Vascular:  Intact  Neurological: No focal neurological deficit,  CN, motor and sensory system grossly intact.  Skin: WNL  Musculoskeletal: WNL  Psychiatric: Grossly WNL        Labs:                          9.9    8.3   )-----------( 230      ( 03 Oct 2017 06:06 )             30.7       10-03    138  |  102  |  7   ----------------------------<  115<H>  3.9   |  29  |  0.94    Ca    8.8      03 Oct 2017 06:06  Phos  2.6     10-02  Mg     2.3     10-02    TPro  7.7  /  Alb  3.1<L>  /  TBili  0.4  /  DBili  x   /  AST  19  /  ALT  14  /  AlkPhos  82  10-01      PT/INR - ( 01 Oct 2017 14:44 )   PT: 12.9 sec;   INR: 1.19 ratio         PTT - ( 01 Oct 2017 14:44 )  PTT:30.6 sec  < from: CT Abdomen and Pelvis w/ Oral Cont and w/ IV Cont (10.01.17 @ 17:45) >  MPRESSION:    Status post left-sided hemicolectomy with a left colostomy with findings   compatible with a high-grade small bowel obstruction with transition zone   in the deep pelvis. Mild enhancement of the proximal small bowel wall   with a short segment of thickening and edema of the small bowel wall and   associated interloop ascites clinically correlate for possible early   ischemic changes versus ileitis. Question pelvic fluid collection versus   fluid within the vaginal cuff versus loop of pelvic small bowel.    Thickening of the bladder wall may represent cystitis versus reactive to   the adjacent inflammatory process. Tiny droplet of air within the bladder   lumen may be secondary to cystitis less likely felt to represent a   enterovesicular fistula.      < end of copied text >
Patient is a 67y old  Female who presents with a chief complaint of SBO (01 Oct 2017 21:01)    HPI:  Patient is 67 year old female with past medical history hyperlipidemia, ovarian cancer s/p PRABHU, colectomy s/p colostomy now admitted on 10/1 for evaluation of nausea, vomiting. Patient recently started a new medication for bladder spasms and also was on po cipro for uti;  she  became constipated, upon admission imaging revealed SBO. Currently has ngt in place. Notes improvement in abdominal pain however, no output through ostomy. Also complains of dysuria.  Today 10/3 patient notes stool in ostomy    MEDICATIONS  (STANDING):  carvedilol 6.25 milliGRAM(s) Oral two times a day  dextrose 5% + sodium chloride 0.45% with potassium chloride 20 mEq/L 1000 milliLiter(s) (125 mL/Hr) IV Continuous <Continuous>  enoxaparin Injectable 50 milliGRAM(s) SubCutaneous every 12 hours  miconazole 2% Cream 1 Application(s) Topical two times a day  pantoprazole  Injectable 40 milliGRAM(s) IV Push daily  piperacillin/tazobactam IVPB. 3.375 Gram(s) IV Intermittent every 8 hours  simvastatin 10 milliGRAM(s) Oral at bedtime    MEDICATIONS  (PRN):  benzocaine 15 mG/menthol 3.6 mG Lozenge 1 Lozenge Oral every 3 hours PRN Sore Throat  benzocaine 20% Spray 1 Spray(s) Topical four times a day PRN sore throat  clonazePAM Tablet 0.5 milliGRAM(s) Oral two times a day PRN anxiety  diphenhydrAMINE   Injectable 25 milliGRAM(s) IV Push at bedtime PRN Insomnia  LORazepam   Injectable 0.5 milliGRAM(s) IV Push every 12 hours PRN Anxiety  morphine  - Injectable 4 milliGRAM(s) IV Push every 4 hours PRN Severe Pain (7 - 10)  morphine  - Injectable 2 milliGRAM(s) IV Push every 6 hours PRN Moderate Pain (4 - 6)  zolpidem 10 milliGRAM(s) Oral at bedtime PRN Insomnia      Vital Signs Last 24 Hrs  T(C): 36.4 (03 Oct 2017 05:49), Max: 36.8 (02 Oct 2017 20:14)  T(F): 97.5 (03 Oct 2017 05:49), Max: 98.3 (02 Oct 2017 20:14)  HR: 70 (03 Oct 2017 05:49) (66 - 70)  BP: 157/62 (03 Oct 2017 05:49) (119/64 - 157/62)  BP(mean): --  RR: 18 (03 Oct 2017 05:49) (18 - 18)  SpO2: 97% (03 Oct 2017 05:49) (97% - 100%)    Physical Exam:          Constitutional: frail looking  HEENT: NC/AT, EOMI, PERRLA  Neck: supple  Respiratory: clear, no r/r/w  Cardiovascular: S1S2 regular, no murmurs  Abdomen: soft, ostomy in place, not distended, diminished BS; stool in ostomy  Genitourinary: deferred  Rectal: deferred  Musculoskeletal: no muscle tenderness, no joint swelling or tenderness  Neurological: AxOx3, moving all extremities, no focal deficits  Skin: no rashes                          11.0   9.7   )-----------( 255      ( 02 Oct 2017 06:37 )             33.6     10-02    133<L>  |  96  |  15  ----------------------------<  160<H>  3.9   |  30  |  1.03    Ca    8.8      02 Oct 2017 06:37  Phos  2.6     10-02  Mg     2.3     10-02    TPro  7.7  /  Alb  3.1<L>  /  TBili  0.4  /  DBili  x   /  AST  19  /  ALT  14  /  AlkPhos  82  10-01     LIVER FUNCTIONS - ( 01 Oct 2017 14:44 )  Alb: 3.1 g/dL / Pro: 7.7 gm/dL / ALK PHOS: 82 U/L / ALT: 14 U/L / AST: 19 U/L / GGT: x           Urinalysis Basic - ( 01 Oct 2017 22:10 )    Color: Green / Appearance: x / S.010 / pH: x  Gluc: x / Ketone: Trace  / Bili: Negative / Urobili: Negative mg/dL   Blood: x / Protein: 100 mg/dL / Nitrite: Positive   Leuk Esterase: Moderate / RBC: >50 /HPF / WBC >50   Sq Epi: x / Non Sq Epi: Few / Bacteria: Moderate      Culture - Blood (10.02.17 @ 01:34)    Specimen Source: .Blood None    Culture Results:   No growth to date.        Radiology:< from: CT Abdomen and Pelvis w/ Oral Cont and w/ IV Cont (10.01.17 @ 17:45) >    IMPRESSION:    Status post left-sided hemicolectomy with a left colostomy with findings   compatible with a high-grade small bowel obstruction with transition zone   in the deep pelvis. Mild enhancement of the proximal small bowel wall   with a short segment of thickening and edema of the small bowel wall and   associated interloop ascites clinically correlate for possible early   ischemic changes versus ileitis. Question pelvic fluid collection versus   fluid within the vaginal cuff versus loop of pelvic small bowel.    Thickening of the bladder wall may represent cystitis versus reactive to   the adjacent inflammatory process. Tiny droplet of air within the bladder   lumen may be secondary to cystitis less likely felt to represent a   enterovesicular fistula.            < end of copied text >      Advanced directive addressed: full resuscitation
· Chief Complaint: The patient is a 67y Female complaining of abdominal pain.	  · HPI Objective Statement: 66 y/o F with hx of 2 colostomies done by dr. shepherd at [facility], Ovarian CA, debulking by Braxton Diaz, presents to ED for evaluation of sudden onset severe abd pain. +n/v. No other complaints. Pt denies fever, chills, HA, SOB, CP.	  · Presenting Symptoms: NAUSEA, PAIN, VOMITING	  · Negative Findings: no blood in stool, no burning urination, no chills, no dysuria	  · Location: abdomen	  · Laterality: bilateral	  · Area: lower	  · Radiation: no radiation	  · Timing: sudden onset	  · Duration: hour(s)	  · Severity: SEVERE	  · Recent Exposure To: none known	  · Relieving Factors: none	    10/3 bowel function resumed.  NGT removed.  Diet started.    Vital Signs Last 24 Hrs  T(C): 36.4 (03 Oct 2017 05:49), Max: 36.8 (02 Oct 2017 20:14)  T(F): 97.5 (03 Oct 2017 05:49), Max: 98.3 (02 Oct 2017 20:14)  HR: 70 (03 Oct 2017 05:49) (66 - 70)  BP: 157/62 (03 Oct 2017 05:49) (119/64 - 157/62)  BP(mean): --  RR: 18 (03 Oct 2017 05:49) (18 - 18)  SpO2: 97% (03 Oct 2017 05:49) (97% - 100%)      CARDIAC MARKERS ( 01 Oct 2017 14:44 )  <0.015 ng/mL / x     / x     / x     / x                                9.9    8.3   )-----------( 230      ( 03 Oct 2017 06:06 )             30.7     CBC Full  -  ( 03 Oct 2017 06:06 )  WBC Count : 8.3 K/uL  Hemoglobin : 9.9 g/dL  Hematocrit : 30.7 %  Platelet Count - Automated : 230 K/uL  Mean Cell Volume : 95.9 fl  Mean Cell Hemoglobin : 31.0 pg  Mean Cell Hemoglobin Concentration : 32.3 gm/dL  Auto Neutrophil # : x  Auto Lymphocyte # : x  Auto Monocyte # : x  Auto Eosinophil # : x  Auto Basophil # : x  Auto Neutrophil % : x  Auto Lymphocyte % : x  Auto Monocyte % : x  Auto Eosinophil % : x  Auto Basophil % : x    10-03    138  |  102  |  7   ----------------------------<  115<H>  3.9   |  29  |  0.94    Ca    8.8      03 Oct 2017 06:06  Phos  2.6     10-  Mg     2.3     10-    TPro  7.7  /  Alb  3.1<L>  /  TBili  0.4  /  DBili  x   /  AST  19  /  ALT  14  /  AlkPhos  82  10    LIVER FUNCTIONS - ( 01 Oct 2017 14:44 )  Alb: 3.1 g/dL / Pro: 7.7 gm/dL / ALK PHOS: 82 U/L / ALT: 14 U/L / AST: 19 U/L / GGT: x           Urinalysis Basic - ( 01 Oct 2017 22:10 )    Color: Green / Appearance: very cloudy / S.010 / pH: x  Gluc: x / Ketone: Trace  / Bili: Negative / Urobili: Negative mg/dL   Blood: x / Protein: 100 mg/dL / Nitrite: Positive   Leuk Esterase: Moderate / RBC: >50 /HPF / WBC >50   Sq Epi: x / Non Sq Epi: Few / Bacteria: Moderate      PT/INR - ( 01 Oct 2017 14:44 )   PT: 12.9 sec;   INR: 1.19 ratio         PTT - ( 01 Oct 2017 14:44 )  PTT:30.6 sec

## 2017-10-03 NOTE — DISCHARGE NOTE ADULT - CARE PROVIDER_API CALL
Sloan Zhou), ColonRectal Surgery; Surgery  [address] [place] [address]  Phone: [phone]  Fax: [phone]

## 2017-10-03 NOTE — DISCHARGE NOTE ADULT - PATIENT PORTAL LINK FT
“You can access the [employer] Patient Portal, offered by [facility], by registering with the following website: [email or web address]”

## 2017-10-04 LAB
-  AMPICILLIN/SULBACTAM: SIGNIFICANT CHANGE UP
-  CEFAZOLIN: SIGNIFICANT CHANGE UP
-  CIPROFLOXACIN: SIGNIFICANT CHANGE UP
-  DAPTOMYCIN: SIGNIFICANT CHANGE UP
-  GENTAMICIN: SIGNIFICANT CHANGE UP
-  LEVOFLOXACIN: SIGNIFICANT CHANGE UP
-  LINEZOLID: SIGNIFICANT CHANGE UP
-  NITROFURANTOIN: SIGNIFICANT CHANGE UP
-  OXACILLIN: SIGNIFICANT CHANGE UP
-  PENICILLIN: SIGNIFICANT CHANGE UP
-  RIFAMPIN: SIGNIFICANT CHANGE UP
-  TETRACYCLINE: SIGNIFICANT CHANGE UP
-  TRIMETHOPRIM/SULFAMETHOXAZOLE: SIGNIFICANT CHANGE UP
-  VANCOMYCIN: SIGNIFICANT CHANGE UP
METHOD TYPE: SIGNIFICANT CHANGE UP

## 2017-10-05 ENCOUNTER — APPOINTMENT (OUTPATIENT)
Dept: UROLOGY | Facility: CLINIC | Age: 67
End: 2017-10-05
Payer: MEDICARE

## 2017-10-05 ENCOUNTER — LABORATORY RESULT (OUTPATIENT)
Age: 67
End: 2017-10-05

## 2017-10-05 DIAGNOSIS — E83.42 HYPOMAGNESEMIA: ICD-10-CM

## 2017-10-05 DIAGNOSIS — Z92.21 PERSONAL HISTORY OF ANTINEOPLASTIC CHEMOTHERAPY: ICD-10-CM

## 2017-10-05 DIAGNOSIS — Z91.018 ALLERGY TO OTHER FOODS: ICD-10-CM

## 2017-10-05 DIAGNOSIS — C56.9 MALIGNANT NEOPLASM OF UNSPECIFIED OVARY: ICD-10-CM

## 2017-10-05 DIAGNOSIS — B96.1 KLEBSIELLA PNEUMONIAE [K. PNEUMONIAE] AS THE CAUSE OF DISEASES CLASSIFIED ELSEWHERE: ICD-10-CM

## 2017-10-05 DIAGNOSIS — N28.1 CYST OF KIDNEY, ACQUIRED: ICD-10-CM

## 2017-10-05 DIAGNOSIS — N39.0 URINARY TRACT INFECTION, SITE NOT SPECIFIED: ICD-10-CM

## 2017-10-05 DIAGNOSIS — R00.1 BRADYCARDIA, UNSPECIFIED: ICD-10-CM

## 2017-10-05 DIAGNOSIS — R10.9 UNSPECIFIED ABDOMINAL PAIN: ICD-10-CM

## 2017-10-05 DIAGNOSIS — Z88.8 ALLERGY STATUS TO OTHER DRUGS, MEDICAMENTS AND BIOLOGICAL SUBSTANCES STATUS: ICD-10-CM

## 2017-10-05 DIAGNOSIS — Z79.899 OTHER LONG TERM (CURRENT) DRUG THERAPY: ICD-10-CM

## 2017-10-05 PROCEDURE — 51701 INSERT BLADDER CATHETER: CPT

## 2017-10-05 PROCEDURE — 99213 OFFICE O/P EST LOW 20 MIN: CPT | Mod: 25

## 2017-10-06 DIAGNOSIS — K56.50 INTESTINAL ADHESIONS [BANDS], UNSPECIFIED AS TO PARTIAL VERSUS COMPLETE OBSTRUCTION: ICD-10-CM

## 2017-10-07 LAB
-  AMPICILLIN: SIGNIFICANT CHANGE UP
-  CIPROFLOXACIN: SIGNIFICANT CHANGE UP
-  NITROFURANTOIN: SIGNIFICANT CHANGE UP
-  TETRACYCLINE: SIGNIFICANT CHANGE UP
-  VANCOMYCIN: SIGNIFICANT CHANGE UP
BACTERIA UR CULT: NORMAL
CULTURE RESULTS: SIGNIFICANT CHANGE UP
CULTURE RESULTS: SIGNIFICANT CHANGE UP
METHOD TYPE: SIGNIFICANT CHANGE UP
ORGANISM # SPEC MICROSCOPIC CNT: SIGNIFICANT CHANGE UP
SPECIMEN SOURCE: SIGNIFICANT CHANGE UP
SPECIMEN SOURCE: SIGNIFICANT CHANGE UP

## 2017-10-12 ENCOUNTER — APPOINTMENT (OUTPATIENT)
Dept: UROLOGY | Facility: CLINIC | Age: 67
End: 2017-10-12
Payer: MEDICARE

## 2017-10-12 VITALS
HEART RATE: 69 BPM | SYSTOLIC BLOOD PRESSURE: 98 MMHG | OXYGEN SATURATION: 100 % | DIASTOLIC BLOOD PRESSURE: 59 MMHG | TEMPERATURE: 97.6 F

## 2017-10-12 PROCEDURE — 52000 CYSTOURETHROSCOPY: CPT

## 2017-10-31 ENCOUNTER — APPOINTMENT (OUTPATIENT)
Dept: GYNECOLOGIC ONCOLOGY | Facility: CLINIC | Age: 67
End: 2017-10-31
Payer: MEDICARE

## 2017-10-31 VITALS
WEIGHT: 104 LBS | HEIGHT: 64 IN | SYSTOLIC BLOOD PRESSURE: 110 MMHG | BODY MASS INDEX: 17.75 KG/M2 | DIASTOLIC BLOOD PRESSURE: 70 MMHG

## 2017-10-31 DIAGNOSIS — R97.1 ELEVATED CANCER ANTIGEN 125 [CA 125]: ICD-10-CM

## 2017-10-31 PROCEDURE — 99214 OFFICE O/P EST MOD 30 MIN: CPT

## 2017-10-31 RX ORDER — SULFAMETHOXAZOLE AND TRIMETHOPRIM 800; 160 MG/1; MG/1
800-160 TABLET ORAL TWICE DAILY
Qty: 14 | Refills: 0 | Status: DISCONTINUED | COMMUNITY
Start: 2017-08-02 | End: 2017-10-31

## 2017-10-31 RX ORDER — SULFAMETHOXAZOLE AND TRIMETHOPRIM 800; 160 MG/1; MG/1
800-160 TABLET ORAL TWICE DAILY
Qty: 14 | Refills: 0 | Status: DISCONTINUED | COMMUNITY
Start: 2017-09-05 | End: 2017-10-31

## 2017-10-31 RX ORDER — METHENAMINE, SODIUM PHOSPHATE, MONOBASIC, MONOHYDRATE, PHENYL SALICYLATE, METHYLENE BLUE, AND HYOSCYAMINE SULFATE 118; 40.8; 36; 10; .12 MG/1; MG/1; MG/1; MG/1; MG/1
118 CAPSULE ORAL 4 TIMES DAILY
Qty: 12 | Refills: 0 | Status: DISCONTINUED | COMMUNITY
Start: 2017-09-29 | End: 2017-10-31

## 2017-10-31 RX ORDER — CIPROFLOXACIN HYDROCHLORIDE 500 MG/1
500 TABLET, FILM COATED ORAL
Qty: 14 | Refills: 2 | Status: DISCONTINUED | COMMUNITY
Start: 2017-08-14 | End: 2017-10-31

## 2017-10-31 RX ORDER — OXYBUTYNIN CHLORIDE 5 MG/1
5 TABLET, EXTENDED RELEASE ORAL
Qty: 30 | Refills: 0 | Status: DISCONTINUED | COMMUNITY
Start: 2017-06-28 | End: 2017-10-31

## 2017-10-31 RX ORDER — TRIMETHOPRIM 100 MG/1
100 TABLET ORAL AT BEDTIME
Qty: 30 | Refills: 1 | Status: DISCONTINUED | COMMUNITY
Start: 2017-09-05 | End: 2017-10-31

## 2017-10-31 RX ORDER — TRIMETHOPRIM 100 MG/1
100 TABLET ORAL
Qty: 30 | Refills: 1 | Status: DISCONTINUED | COMMUNITY
Start: 2017-10-12 | End: 2017-10-31

## 2017-11-07 ENCOUNTER — APPOINTMENT (OUTPATIENT)
Dept: UROLOGY | Facility: CLINIC | Age: 67
End: 2017-11-07
Payer: MEDICARE

## 2017-11-07 PROCEDURE — 99214 OFFICE O/P EST MOD 30 MIN: CPT

## 2017-11-10 ENCOUNTER — MESSAGE (OUTPATIENT)
Age: 67
End: 2017-11-10

## 2017-11-10 LAB — BACTERIA UR CULT: ABNORMAL

## 2017-11-10 RX ORDER — LEVOFLOXACIN 500 MG/1
500 TABLET, FILM COATED ORAL DAILY
Qty: 14 | Refills: 1 | Status: ACTIVE | COMMUNITY
Start: 2017-11-10 | End: 1900-01-01

## 2017-11-13 ENCOUNTER — MESSAGE (OUTPATIENT)
Age: 67
End: 2017-11-13

## 2017-11-17 ENCOUNTER — INPATIENT (INPATIENT)
Facility: HOSPITAL | Age: 67
LOS: 3 days | Discharge: HOME | End: 2017-11-21
Attending: SURGERY | Admitting: SURGERY
Payer: MEDICARE

## 2017-11-17 VITALS — HEIGHT: 64 IN | WEIGHT: 104.06 LBS

## 2017-11-17 DIAGNOSIS — K56.609 UNSPECIFIED INTESTINAL OBSTRUCTION, UNSPECIFIED AS TO PARTIAL VERSUS COMPLETE OBSTRUCTION: ICD-10-CM

## 2017-11-17 DIAGNOSIS — Z98.890 OTHER SPECIFIED POSTPROCEDURAL STATES: Chronic | ICD-10-CM

## 2017-11-17 DIAGNOSIS — I26.99 OTHER PULMONARY EMBOLISM WITHOUT ACUTE COR PULMONALE: ICD-10-CM

## 2017-11-17 DIAGNOSIS — N85.9 NONINFLAMMATORY DISORDER OF UTERUS, UNSPECIFIED: Chronic | ICD-10-CM

## 2017-11-17 DIAGNOSIS — Z93.3 COLOSTOMY STATUS: Chronic | ICD-10-CM

## 2017-11-17 DIAGNOSIS — Z90.49 ACQUIRED ABSENCE OF OTHER SPECIFIED PARTS OF DIGESTIVE TRACT: Chronic | ICD-10-CM

## 2017-11-17 DIAGNOSIS — Z90.710 ACQUIRED ABSENCE OF BOTH CERVIX AND UTERUS: Chronic | ICD-10-CM

## 2017-11-17 LAB
ALBUMIN SERPL ELPH-MCNC: 3 G/DL — LOW (ref 3.3–5)
ALP SERPL-CCNC: 76 U/L — SIGNIFICANT CHANGE UP (ref 40–120)
ALT FLD-CCNC: 14 U/L — SIGNIFICANT CHANGE UP (ref 12–78)
ANION GAP SERPL CALC-SCNC: 7 MMOL/L — SIGNIFICANT CHANGE UP (ref 5–17)
APPEARANCE UR: (no result)
APTT BLD: 38.2 SEC — HIGH (ref 27.5–37.4)
AST SERPL-CCNC: 22 U/L — SIGNIFICANT CHANGE UP (ref 15–37)
BACTERIA # UR AUTO: (no result)
BASOPHILS # BLD AUTO: 0 K/UL — SIGNIFICANT CHANGE UP (ref 0–0.2)
BASOPHILS NFR BLD AUTO: 0.5 % — SIGNIFICANT CHANGE UP (ref 0–2)
BILIRUB SERPL-MCNC: 0.3 MG/DL — SIGNIFICANT CHANGE UP (ref 0.2–1.2)
BILIRUB UR-MCNC: NEGATIVE — SIGNIFICANT CHANGE UP
BUN SERPL-MCNC: 25 MG/DL — HIGH (ref 7–23)
CALCIUM SERPL-MCNC: 9.6 MG/DL — SIGNIFICANT CHANGE UP (ref 8.5–10.1)
CHLORIDE SERPL-SCNC: 96 MMOL/L — SIGNIFICANT CHANGE UP (ref 96–108)
CO2 SERPL-SCNC: 33 MMOL/L — HIGH (ref 22–31)
COLOR SPEC: YELLOW — SIGNIFICANT CHANGE UP
CREAT SERPL-MCNC: 1.43 MG/DL — HIGH (ref 0.5–1.3)
DIFF PNL FLD: (no result)
EOSINOPHIL # BLD AUTO: 0 K/UL — SIGNIFICANT CHANGE UP (ref 0–0.5)
EOSINOPHIL NFR BLD AUTO: 0.9 % — SIGNIFICANT CHANGE UP (ref 0–6)
EPI CELLS # UR: SIGNIFICANT CHANGE UP
GLUCOSE SERPL-MCNC: 123 MG/DL — HIGH (ref 70–99)
GLUCOSE UR QL: NEGATIVE MG/DL — SIGNIFICANT CHANGE UP
HCT VFR BLD CALC: 31.7 % — LOW (ref 34.5–45)
HGB BLD-MCNC: 10.5 G/DL — LOW (ref 11.5–15.5)
INR BLD: 2.45 RATIO — HIGH (ref 0.88–1.16)
KETONES UR-MCNC: (no result)
LACTATE SERPL-SCNC: 0.9 MMOL/L — SIGNIFICANT CHANGE UP (ref 0.7–2)
LEUKOCYTE ESTERASE UR-ACNC: (no result)
LIDOCAIN IGE QN: 299 U/L — SIGNIFICANT CHANGE UP (ref 73–393)
LYMPHOCYTES # BLD AUTO: 0.5 K/UL — LOW (ref 1–3.3)
LYMPHOCYTES # BLD AUTO: 11.2 % — LOW (ref 13–44)
MCHC RBC-ENTMCNC: 33 GM/DL — SIGNIFICANT CHANGE UP (ref 32–36)
MCHC RBC-ENTMCNC: 33.5 PG — SIGNIFICANT CHANGE UP (ref 27–34)
MCV RBC AUTO: 101.5 FL — HIGH (ref 80–100)
MONOCYTES # BLD AUTO: 0.2 K/UL — SIGNIFICANT CHANGE UP (ref 0–0.9)
MONOCYTES NFR BLD AUTO: 4.1 % — SIGNIFICANT CHANGE UP (ref 2–14)
NEUTROPHILS # BLD AUTO: 3.8 K/UL — SIGNIFICANT CHANGE UP (ref 1.8–7.4)
NEUTROPHILS NFR BLD AUTO: 83.3 % — HIGH (ref 43–77)
NITRITE UR-MCNC: NEGATIVE — SIGNIFICANT CHANGE UP
PH UR: 6 — SIGNIFICANT CHANGE UP (ref 5–8)
PLATELET # BLD AUTO: 228 K/UL — SIGNIFICANT CHANGE UP (ref 150–400)
POTASSIUM SERPL-MCNC: 3.9 MMOL/L — SIGNIFICANT CHANGE UP (ref 3.5–5.3)
POTASSIUM SERPL-SCNC: 3.9 MMOL/L — SIGNIFICANT CHANGE UP (ref 3.5–5.3)
PROT SERPL-MCNC: 7.5 GM/DL — SIGNIFICANT CHANGE UP (ref 6–8.3)
PROT UR-MCNC: 15 MG/DL
PROTHROM AB SERPL-ACNC: 27 SEC — HIGH (ref 9.8–12.7)
RBC # BLD: 3.12 M/UL — LOW (ref 3.8–5.2)
RBC # FLD: 15.7 % — HIGH (ref 10.3–14.5)
RBC CASTS # UR COMP ASSIST: (no result) /HPF (ref 0–4)
SODIUM SERPL-SCNC: 136 MMOL/L — SIGNIFICANT CHANGE UP (ref 135–145)
SP GR SPEC: 1.02 — SIGNIFICANT CHANGE UP (ref 1.01–1.02)
UROBILINOGEN FLD QL: NEGATIVE MG/DL — SIGNIFICANT CHANGE UP
WBC # BLD: 4.5 K/UL — SIGNIFICANT CHANGE UP (ref 3.8–10.5)
WBC # FLD AUTO: 4.5 K/UL — SIGNIFICANT CHANGE UP (ref 3.8–10.5)
WBC UR QL: >50

## 2017-11-17 PROCEDURE — 93010 ELECTROCARDIOGRAM REPORT: CPT

## 2017-11-17 PROCEDURE — 74177 CT ABD & PELVIS W/CONTRAST: CPT | Mod: 26

## 2017-11-17 PROCEDURE — 99285 EMERGENCY DEPT VISIT HI MDM: CPT

## 2017-11-17 RX ORDER — ONDANSETRON 8 MG/1
4 TABLET, FILM COATED ORAL EVERY 6 HOURS
Qty: 0 | Refills: 0 | Status: DISCONTINUED | OUTPATIENT
Start: 2017-11-17 | End: 2017-11-21

## 2017-11-17 RX ORDER — HYDROMORPHONE HYDROCHLORIDE 2 MG/ML
0.5 INJECTION INTRAMUSCULAR; INTRAVENOUS; SUBCUTANEOUS ONCE
Qty: 0 | Refills: 0 | Status: DISCONTINUED | OUTPATIENT
Start: 2017-11-17 | End: 2017-11-17

## 2017-11-17 RX ORDER — MORPHINE SULFATE 50 MG/1
4 CAPSULE, EXTENDED RELEASE ORAL ONCE
Qty: 0 | Refills: 0 | Status: DISCONTINUED | OUTPATIENT
Start: 2017-11-17 | End: 2017-11-17

## 2017-11-17 RX ORDER — ONDANSETRON 8 MG/1
8 TABLET, FILM COATED ORAL ONCE
Qty: 0 | Refills: 0 | Status: COMPLETED | OUTPATIENT
Start: 2017-11-17 | End: 2017-11-17

## 2017-11-17 RX ORDER — METOCLOPRAMIDE HCL 10 MG
10 TABLET ORAL ONCE
Qty: 0 | Refills: 0 | Status: COMPLETED | OUTPATIENT
Start: 2017-11-17 | End: 2017-11-17

## 2017-11-17 RX ORDER — HYDROMORPHONE HYDROCHLORIDE 2 MG/ML
0.5 INJECTION INTRAMUSCULAR; INTRAVENOUS; SUBCUTANEOUS EVERY 4 HOURS
Qty: 0 | Refills: 0 | Status: DISCONTINUED | OUTPATIENT
Start: 2017-11-17 | End: 2017-11-20

## 2017-11-17 RX ORDER — SODIUM CHLORIDE 9 MG/ML
1000 INJECTION INTRAMUSCULAR; INTRAVENOUS; SUBCUTANEOUS
Qty: 0 | Refills: 0 | Status: DISCONTINUED | OUTPATIENT
Start: 2017-11-17 | End: 2017-11-20

## 2017-11-17 RX ORDER — METOPROLOL TARTRATE 50 MG
5 TABLET ORAL EVERY 6 HOURS
Qty: 0 | Refills: 0 | Status: DISCONTINUED | OUTPATIENT
Start: 2017-11-17 | End: 2017-11-20

## 2017-11-17 RX ORDER — PANTOPRAZOLE SODIUM 20 MG/1
40 TABLET, DELAYED RELEASE ORAL DAILY
Qty: 0 | Refills: 0 | Status: DISCONTINUED | OUTPATIENT
Start: 2017-11-17 | End: 2017-11-21

## 2017-11-17 RX ADMIN — SODIUM CHLORIDE 125 MILLILITER(S): 9 INJECTION INTRAMUSCULAR; INTRAVENOUS; SUBCUTANEOUS at 22:13

## 2017-11-17 RX ADMIN — HYDROMORPHONE HYDROCHLORIDE 0.5 MILLIGRAM(S): 2 INJECTION INTRAMUSCULAR; INTRAVENOUS; SUBCUTANEOUS at 20:00

## 2017-11-17 RX ADMIN — MORPHINE SULFATE 4 MILLIGRAM(S): 50 CAPSULE, EXTENDED RELEASE ORAL at 18:30

## 2017-11-17 RX ADMIN — SODIUM CHLORIDE 100 MILLILITER(S): 9 INJECTION INTRAMUSCULAR; INTRAVENOUS; SUBCUTANEOUS at 20:22

## 2017-11-17 RX ADMIN — Medication 10 MILLIGRAM(S): at 18:07

## 2017-11-17 RX ADMIN — MORPHINE SULFATE 4 MILLIGRAM(S): 50 CAPSULE, EXTENDED RELEASE ORAL at 18:00

## 2017-11-17 RX ADMIN — ONDANSETRON 8 MILLIGRAM(S): 8 TABLET, FILM COATED ORAL at 17:28

## 2017-11-17 RX ADMIN — MORPHINE SULFATE 4 MILLIGRAM(S): 50 CAPSULE, EXTENDED RELEASE ORAL at 18:07

## 2017-11-17 RX ADMIN — HYDROMORPHONE HYDROCHLORIDE 0.5 MILLIGRAM(S): 2 INJECTION INTRAMUSCULAR; INTRAVENOUS; SUBCUTANEOUS at 19:38

## 2017-11-17 RX ADMIN — MORPHINE SULFATE 4 MILLIGRAM(S): 50 CAPSULE, EXTENDED RELEASE ORAL at 17:33

## 2017-11-17 NOTE — H&P ADULT - NSHPREVIEWOFSYSTEMS_GEN_ALL_CORE
REVIEW OF SYSTEMS:    CONSTITUTIONAL: No fever, weight loss, or fatigue  EYES: No eye pain, visual disturbances, or discharge  ENMT:  No difficulty hearing, tinnitus, vertigo; No sinus or throat pain  NECK: No pain or stiffness  BREASTS: No pain, masses, or nipple discharge  RESPIRATORY: No cough, wheezing, chills or hemoptysis; No shortness of breath  CARDIOVASCULAR: No chest pain, palpitations, dizziness, or leg swelling  GASTROINTESTINAL:see hpi  GENITOURINARY: currently being treated for uti  NEUROLOGICAL: No headaches, memory loss, loss of strength, numbness, or tremors    LYMPH NODES: No enlarged glands  ENDOCRINE: No heat or cold intolerance; No hair loss  MUSCULOSKELETAL: No joint pain or swelling; No muscle, back, or extremity pain  PSYCHIATRIC: No depression, anxiety, mood swings, or difficulty sleeping

## 2017-11-17 NOTE — H&P ADULT - HISTORY OF PRESENT ILLNESS
67 y.o female with history of ovarian cancer diagnosed in 2016 currently on chem with h/o sbo presents with vomiting and not passing stool via fistula x one day.  pt denies fever. Pt has history of hartmans in 2016 that was attempted to be reversed in 2017 but reversal was not done because cancer was found.  That wound broke down and pt developed a fistula that has been drining stool until today.  PT also is on xeralto fo history of PE (2016) and is currently being treated for UTI.  Pt states she had a SBO 11 weeks ago and it presented the same way.

## 2017-11-17 NOTE — H&P ADULT - NSHPPHYSICALEXAM_GEN_ALL_CORE
CONSTITUTIONAL: NAD, well-groomed, well-developed  HEAD:  Atraumatic, Normocephalic  EYES: EOMI, PERRLA, conjunctiva and sclera clear  ENMT: No tonsillar erythema, exudates, or enlargement; Moist mucous membranes, Good dentition, No lesions  NECK: Supple, No JVD, Normal thyroid  NERVOUS SYSTEM:  Alert & Oriented X3, Good concentration; Motor Strength 5/5 B/L upper and lower extremities; DTRs 2+ intact and symmetric  CHEST/LUNG: Clear to percussion bilaterally; No rales, rhonchi, wheezing, or rubs  HEART: Regular rate and rhythm; No murmurs, rubs, or gallops  ABDOMEN: Soft, distended, hyperactive bs, ngt in place  EXTREMITIES:  2+ Peripheral Pulses, No clubbing, cyanosis, or edema

## 2017-11-17 NOTE — ED PROVIDER NOTE - OBJECTIVE STATEMENT
68 y/o female with hx of Ovarian cancer w/ surgery 03/2016 Dr. Diaz and currently on chemotherapy presents to the ED c/o abd pain starting two days ago. + constipation for two days, NV. Pt was in the ED 2 months ago for the same symptoms and was diagnosed with SBO. Pt is currently being treated for a UTI. Pt states she had a scan in March/April of this year and they thought everything was clear so they took her into surgery to remove the colostomy bag she had placed but found cancer. Pt had a CT done 2 days ago with her oncologist but doesn't know the results yet. Next chemo next Wednesday(in 5 days). Pt does have a colostomy bag in right now that is working but empty of stool for two days. PCP- Dr. Acevedo

## 2017-11-17 NOTE — H&P ADULT - ASSESSMENT
history of ovarian cancer- with sbo/uti/history of PE  -admit ngt/ivf  -med consut and anticolgulation consutl

## 2017-11-17 NOTE — ED STATDOCS - PROGRESS NOTE DETAILS
Herber Haile on behalf of Attending Dr. Calhoun. 66 y/o F with PMHx of ovarian CA presents to the ED c/o abd pain, N/V and constipation for the last two days. Last BM was two days ago. PSHx of ostomy last year, abdominal hysterectomy. Dr. Zhou, GI. Pt will be sent to the Main ED for further evaluation.

## 2017-11-17 NOTE — ED ADULT NURSE REASSESSMENT NOTE - NS ED NURSE REASSESS COMMENT FT1
NG tube 16 German placed as ordered. Patient tolerated well, attached to intermittent suctioning. VSS. Pending admission. Will continue monitoring patient.

## 2017-11-17 NOTE — ED ADULT TRIAGE NOTE - CHIEF COMPLAINT QUOTE
presents with abd pain and nausea since this morning. reports that she hasn't moved her bowels. denies fevers/chills

## 2017-11-18 LAB
ANION GAP SERPL CALC-SCNC: 7 MMOL/L — SIGNIFICANT CHANGE UP (ref 5–17)
APTT BLD: 31.6 SEC — SIGNIFICANT CHANGE UP (ref 27.5–37.4)
BASOPHILS # BLD AUTO: 0.1 K/UL — SIGNIFICANT CHANGE UP (ref 0–0.2)
BASOPHILS NFR BLD AUTO: 1.3 % — SIGNIFICANT CHANGE UP (ref 0–2)
BUN SERPL-MCNC: 23 MG/DL — SIGNIFICANT CHANGE UP (ref 7–23)
CALCIUM SERPL-MCNC: 9.3 MG/DL — SIGNIFICANT CHANGE UP (ref 8.5–10.1)
CHLORIDE SERPL-SCNC: 97 MMOL/L — SIGNIFICANT CHANGE UP (ref 96–108)
CO2 SERPL-SCNC: 35 MMOL/L — HIGH (ref 22–31)
CREAT SERPL-MCNC: 1.21 MG/DL — SIGNIFICANT CHANGE UP (ref 0.5–1.3)
EOSINOPHIL # BLD AUTO: 0.1 K/UL — SIGNIFICANT CHANGE UP (ref 0–0.5)
EOSINOPHIL NFR BLD AUTO: 3.1 % — SIGNIFICANT CHANGE UP (ref 0–6)
GLUCOSE SERPL-MCNC: 105 MG/DL — HIGH (ref 70–99)
HCT VFR BLD CALC: 29.6 % — LOW (ref 34.5–45)
HGB BLD-MCNC: 9.3 G/DL — LOW (ref 11.5–15.5)
LYMPHOCYTES # BLD AUTO: 0.7 K/UL — LOW (ref 1–3.3)
LYMPHOCYTES # BLD AUTO: 18.6 % — SIGNIFICANT CHANGE UP (ref 13–44)
MCHC RBC-ENTMCNC: 31.3 GM/DL — LOW (ref 32–36)
MCHC RBC-ENTMCNC: 32.2 PG — SIGNIFICANT CHANGE UP (ref 27–34)
MCV RBC AUTO: 102.7 FL — HIGH (ref 80–100)
MONOCYTES # BLD AUTO: 0.3 K/UL — SIGNIFICANT CHANGE UP (ref 0–0.9)
MONOCYTES NFR BLD AUTO: 7 % — SIGNIFICANT CHANGE UP (ref 2–14)
NEUTROPHILS # BLD AUTO: 2.8 K/UL — SIGNIFICANT CHANGE UP (ref 1.8–7.4)
NEUTROPHILS NFR BLD AUTO: 70.1 % — SIGNIFICANT CHANGE UP (ref 43–77)
PLATELET # BLD AUTO: 210 K/UL — SIGNIFICANT CHANGE UP (ref 150–400)
POTASSIUM SERPL-MCNC: 3.3 MMOL/L — LOW (ref 3.5–5.3)
POTASSIUM SERPL-SCNC: 3.3 MMOL/L — LOW (ref 3.5–5.3)
RBC # BLD: 2.88 M/UL — LOW (ref 3.8–5.2)
RBC # FLD: 16.2 % — HIGH (ref 10.3–14.5)
SODIUM SERPL-SCNC: 139 MMOL/L — SIGNIFICANT CHANGE UP (ref 135–145)
WBC # BLD: 4 K/UL — SIGNIFICANT CHANGE UP (ref 3.8–10.5)
WBC # FLD AUTO: 4 K/UL — SIGNIFICANT CHANGE UP (ref 3.8–10.5)

## 2017-11-18 PROCEDURE — 99223 1ST HOSP IP/OBS HIGH 75: CPT

## 2017-11-18 RX ORDER — INFLUENZA VIRUS VACCINE 15; 15; 15; 15 UG/.5ML; UG/.5ML; UG/.5ML; UG/.5ML
0.5 SUSPENSION INTRAMUSCULAR ONCE
Qty: 0 | Refills: 0 | Status: COMPLETED | OUTPATIENT
Start: 2017-11-18 | End: 2017-11-21

## 2017-11-18 RX ORDER — ENOXAPARIN SODIUM 100 MG/ML
40 INJECTION SUBCUTANEOUS EVERY 12 HOURS
Qty: 0 | Refills: 0 | Status: DISCONTINUED | OUTPATIENT
Start: 2017-11-18 | End: 2017-11-20

## 2017-11-18 RX ORDER — CIPROFLOXACIN LACTATE 400MG/40ML
400 VIAL (ML) INTRAVENOUS EVERY 12 HOURS
Qty: 0 | Refills: 0 | Status: DISCONTINUED | OUTPATIENT
Start: 2017-11-18 | End: 2017-11-20

## 2017-11-18 RX ORDER — POTASSIUM CHLORIDE 20 MEQ
10 PACKET (EA) ORAL
Qty: 0 | Refills: 0 | Status: COMPLETED | OUTPATIENT
Start: 2017-11-18 | End: 2017-11-18

## 2017-11-18 RX ADMIN — SODIUM CHLORIDE 125 MILLILITER(S): 9 INJECTION INTRAMUSCULAR; INTRAVENOUS; SUBCUTANEOUS at 06:50

## 2017-11-18 RX ADMIN — Medication 100 MILLIEQUIVALENT(S): at 09:39

## 2017-11-18 RX ADMIN — HYDROMORPHONE HYDROCHLORIDE 0.5 MILLIGRAM(S): 2 INJECTION INTRAMUSCULAR; INTRAVENOUS; SUBCUTANEOUS at 03:54

## 2017-11-18 RX ADMIN — HYDROMORPHONE HYDROCHLORIDE 0.5 MILLIGRAM(S): 2 INJECTION INTRAMUSCULAR; INTRAVENOUS; SUBCUTANEOUS at 10:49

## 2017-11-18 RX ADMIN — Medication 5 MILLIGRAM(S): at 17:57

## 2017-11-18 RX ADMIN — Medication 5 MILLIGRAM(S): at 06:50

## 2017-11-18 RX ADMIN — HYDROMORPHONE HYDROCHLORIDE 0.5 MILLIGRAM(S): 2 INJECTION INTRAMUSCULAR; INTRAVENOUS; SUBCUTANEOUS at 06:51

## 2017-11-18 RX ADMIN — Medication 100 MILLIEQUIVALENT(S): at 12:08

## 2017-11-18 RX ADMIN — PANTOPRAZOLE SODIUM 40 MILLIGRAM(S): 20 TABLET, DELAYED RELEASE ORAL at 12:07

## 2017-11-18 RX ADMIN — HYDROMORPHONE HYDROCHLORIDE 0.5 MILLIGRAM(S): 2 INJECTION INTRAMUSCULAR; INTRAVENOUS; SUBCUTANEOUS at 14:30

## 2017-11-18 RX ADMIN — Medication 200 MILLIGRAM(S): at 17:57

## 2017-11-18 RX ADMIN — Medication 5 MILLIGRAM(S): at 12:07

## 2017-11-18 RX ADMIN — HYDROMORPHONE HYDROCHLORIDE 0.5 MILLIGRAM(S): 2 INJECTION INTRAMUSCULAR; INTRAVENOUS; SUBCUTANEOUS at 19:20

## 2017-11-18 RX ADMIN — Medication 5 MILLIGRAM(S): at 00:38

## 2017-11-18 RX ADMIN — Medication 100 MILLIEQUIVALENT(S): at 08:28

## 2017-11-18 NOTE — PROGRESS NOTE ADULT - ASSESSMENT
This is a 67 y.o female with history of HTN/HLD, PE diagnosed in  (Xarelto 20 mg), perforated sigmoid colon with s/p Reymundo procedure 2016 and attempted to reverse in 2017 but new tumor found, ovarian cancer diagnosed in  currently on chemo (last session "1st week of November" with h/o SBO presents with vomiting and not passing stool via fistula x one day. Currently being treated for UTI (cipro 400 mg q12h), positive MRSA and on contact isolation. She reports that initially Dr. Braxton Diaz had placed her on lovenox when PE first diagnosed in 2016, and then Dr. Nunez from [facility] had transitioned her to Xarelto 20 mg. She denies bleeding complications and tolerated medication well. However, pt currently NPO, has NGT in place with 700 ml of green fluid, her home medication Xarelto 20 mg needs to be taken with food for absorption of med.     PLAN:  - will start treatment dose lovenox 40 mg SQ q12h (please avoid abdomen). Pt with IMPROVE VTE score of 4  - recommend f/u with Dr. Nunez from [facility] to [place] recent CT chest if PE resolved  - d/t high thrombosis risk, pt may resume Xarelto 20 mg once cleared diet consumption as per primary team; however avoid   - monitor s/s bleeding, daily CBC/BMP. Consider transfusion if Hgb < 7 (admission Hgb 13.1->9.3 today)  - encourage ambulation   - maintain b/l venodynes  - avoid CY if and when pt on Xarelto (e.g. ketoconazole, itraconazole, ritonavir, indinavir, conivaptan) increase the serum concentration of rivaroxaban, (e.g. rifampin, carbamazepine, phenytoin, [place]’s Wort) reduce the serum concentration of rivaroxaban, (e.g. diltiazem, verapamil, dronedarone, erythromycin) or Clarithromycin may increase the serum concentration of rivaroxaban     Thank you for the consult, will continue to follow. Please call [phone] for this weekend.

## 2017-11-19 LAB
-  AMPICILLIN/SULBACTAM: SIGNIFICANT CHANGE UP
-  CEFAZOLIN: SIGNIFICANT CHANGE UP
-  CIPROFLOXACIN: SIGNIFICANT CHANGE UP
-  DAPTOMYCIN: SIGNIFICANT CHANGE UP
-  GENTAMICIN: SIGNIFICANT CHANGE UP
-  LEVOFLOXACIN: SIGNIFICANT CHANGE UP
-  LINEZOLID: SIGNIFICANT CHANGE UP
-  NITROFURANTOIN: SIGNIFICANT CHANGE UP
-  OXACILLIN: SIGNIFICANT CHANGE UP
-  PENICILLIN: SIGNIFICANT CHANGE UP
-  RIFAMPIN: SIGNIFICANT CHANGE UP
-  TETRACYCLINE: SIGNIFICANT CHANGE UP
-  TRIMETHOPRIM/SULFAMETHOXAZOLE: SIGNIFICANT CHANGE UP
-  VANCOMYCIN: SIGNIFICANT CHANGE UP
APPEARANCE UR: (no result)
BILIRUB UR-MCNC: NEGATIVE — SIGNIFICANT CHANGE UP
COLOR SPEC: YELLOW — SIGNIFICANT CHANGE UP
CULTURE RESULTS: SIGNIFICANT CHANGE UP
DIFF PNL FLD: (no result)
GLUCOSE UR QL: NEGATIVE MG/DL — SIGNIFICANT CHANGE UP
KETONES UR-MCNC: (no result)
LEUKOCYTE ESTERASE UR-ACNC: (no result)
METHOD TYPE: SIGNIFICANT CHANGE UP
NITRITE UR-MCNC: NEGATIVE — SIGNIFICANT CHANGE UP
ORGANISM # SPEC MICROSCOPIC CNT: SIGNIFICANT CHANGE UP
ORGANISM # SPEC MICROSCOPIC CNT: SIGNIFICANT CHANGE UP
PH UR: 5 — SIGNIFICANT CHANGE UP (ref 5–8)
PROT UR-MCNC: 100 MG/DL
SP GR SPEC: 1.02 — SIGNIFICANT CHANGE UP (ref 1.01–1.02)
SPECIMEN SOURCE: SIGNIFICANT CHANGE UP
UROBILINOGEN FLD QL: NEGATIVE MG/DL — SIGNIFICANT CHANGE UP

## 2017-11-19 PROCEDURE — 99233 SBSQ HOSP IP/OBS HIGH 50: CPT

## 2017-11-19 RX ORDER — LANOLIN ALCOHOL/MO/W.PET/CERES
3 CREAM (GRAM) TOPICAL AT BEDTIME
Qty: 0 | Refills: 0 | Status: DISCONTINUED | OUTPATIENT
Start: 2017-11-19 | End: 2017-11-21

## 2017-11-19 RX ADMIN — HYDROMORPHONE HYDROCHLORIDE 0.5 MILLIGRAM(S): 2 INJECTION INTRAMUSCULAR; INTRAVENOUS; SUBCUTANEOUS at 19:56

## 2017-11-19 RX ADMIN — ENOXAPARIN SODIUM 40 MILLIGRAM(S): 100 INJECTION SUBCUTANEOUS at 17:45

## 2017-11-19 RX ADMIN — Medication 5 MILLIGRAM(S): at 06:35

## 2017-11-19 RX ADMIN — PANTOPRAZOLE SODIUM 40 MILLIGRAM(S): 20 TABLET, DELAYED RELEASE ORAL at 12:57

## 2017-11-19 RX ADMIN — Medication 200 MILLIGRAM(S): at 17:34

## 2017-11-19 RX ADMIN — Medication 5 MILLIGRAM(S): at 00:03

## 2017-11-19 RX ADMIN — SODIUM CHLORIDE 100 MILLILITER(S): 9 INJECTION INTRAMUSCULAR; INTRAVENOUS; SUBCUTANEOUS at 12:57

## 2017-11-19 RX ADMIN — ONDANSETRON 4 MILLIGRAM(S): 8 TABLET, FILM COATED ORAL at 23:28

## 2017-11-19 RX ADMIN — HYDROMORPHONE HYDROCHLORIDE 0.5 MILLIGRAM(S): 2 INJECTION INTRAMUSCULAR; INTRAVENOUS; SUBCUTANEOUS at 07:00

## 2017-11-19 RX ADMIN — HYDROMORPHONE HYDROCHLORIDE 0.5 MILLIGRAM(S): 2 INJECTION INTRAMUSCULAR; INTRAVENOUS; SUBCUTANEOUS at 00:04

## 2017-11-19 RX ADMIN — Medication 5 MILLIGRAM(S): at 12:57

## 2017-11-19 RX ADMIN — HYDROMORPHONE HYDROCHLORIDE 0.5 MILLIGRAM(S): 2 INJECTION INTRAMUSCULAR; INTRAVENOUS; SUBCUTANEOUS at 14:32

## 2017-11-19 RX ADMIN — HYDROMORPHONE HYDROCHLORIDE 0.5 MILLIGRAM(S): 2 INJECTION INTRAMUSCULAR; INTRAVENOUS; SUBCUTANEOUS at 04:50

## 2017-11-19 RX ADMIN — Medication 5 MILLIGRAM(S): at 18:49

## 2017-11-19 RX ADMIN — Medication 5 MILLIGRAM(S): at 23:28

## 2017-11-19 RX ADMIN — ENOXAPARIN SODIUM 40 MILLIGRAM(S): 100 INJECTION SUBCUTANEOUS at 06:35

## 2017-11-19 RX ADMIN — Medication 200 MILLIGRAM(S): at 06:35

## 2017-11-19 RX ADMIN — HYDROMORPHONE HYDROCHLORIDE 0.5 MILLIGRAM(S): 2 INJECTION INTRAMUSCULAR; INTRAVENOUS; SUBCUTANEOUS at 03:57

## 2017-11-19 RX ADMIN — HYDROMORPHONE HYDROCHLORIDE 0.5 MILLIGRAM(S): 2 INJECTION INTRAMUSCULAR; INTRAVENOUS; SUBCUTANEOUS at 19:41

## 2017-11-19 RX ADMIN — ONDANSETRON 4 MILLIGRAM(S): 8 TABLET, FILM COATED ORAL at 14:40

## 2017-11-19 RX ADMIN — HYDROMORPHONE HYDROCHLORIDE 0.5 MILLIGRAM(S): 2 INJECTION INTRAMUSCULAR; INTRAVENOUS; SUBCUTANEOUS at 09:01

## 2017-11-19 RX ADMIN — Medication 3 MILLIGRAM(S): at 23:28

## 2017-11-19 NOTE — CONSULT NOTE ADULT - SUBJECTIVE AND OBJECTIVE BOX
CHIEF COMPLAINT: Patient is a 67y old  Female who presents with a chief complaint of no ostomy output (17 Nov 2017 23:53)      HPI:  67 y.o female with history of ovarian cancer diagnosed in 2016 currently on chem with h/o sbo presents with vomiting and not passing stool via fistula x one day.  pt denies fever. Pt has history of hartmans in 2016 that was attempted to be reversed in 2017 but reversal was not done because cancer was found.  That wound broke down and pt developed a fistula that has been drining stool until today.  PT also is on xeralto fo history of PE (2016) and is currently being treated for UTI.  Pt states she had a SBO 11 weeks ago and it presented the same way. (17 Nov 2017 21:25)      PMHx: PAST MEDICAL & SURGICAL HISTORY:  Perforation of sigmoid colon: s/p Reymundo procedure 4/2016  Ovarian cancer  Other acute pulmonary embolism  Essential hypertension, hypertension with unspecified goal  Malignant neoplasm of ovary, unspecified laterality  History of conization of cervix: 1984  Uterus disorder: removed in 2003, due to fibroids  H/O skin graft: to abdominal wound (11/2016)  S/P cholecystectomy: 2011  H/O abdominal hysterectomy: removal of ovaries, tubes ,omentum,radical debulking  Status post Reymundo procedure: April 2016        Soc Hx:  livces at home      Allergies: Allergies    acetaminophen (Hives)  Bananas (Unknown)  ibuprofen (Other)  Lexapro (Unknown)  Originally Entered as [Unknown] reaction to [cantelope] (Unknown)  Originally Entered as [Unknown] reaction to [cherries] (Unknown)  Peaches (Unknown)  wallnuts, paches, bananas, honedew and cantalope (Hives; Short breath)  walnut (Stomach Upset (Severe))  Wellbutrin (Hives)    Intolerances          REVIEW OF SYSTEMS:    CONSTITUTIONAL: No weakness, fevers or chills  EYES/ENT: No visual changes;  No vertigo or throat pain   NECK: No pain or stiffness  RESPIRATORY: No cough, wheezing, hemoptysis; No shortness of breath  CARDIOVASCULAR: No chest pain or palpitations  GASTROINTESTINAL: No abdominal or epigastric pain. No nausea, vomiting, or hematemesis; No diarrhea or constipation. No melena or hematochezia.  GENITOURINARY: No dysuria, frequency or hematuria  NEUROLOGICAL: No numbness or weakness  SKIN: No itching, burning, rashes, or lesions   All other review of systems is negative unless indicated above    ICU Vital Signs Last 24 Hrs  T(C): 36.8 (18 Nov 2017 21:04), Max: 36.8 (18 Nov 2017 21:04)  T(F): 98.2 (18 Nov 2017 21:04), Max: 98.2 (18 Nov 2017 21:04)  HR: 84 (18 Nov 2017 21:04) (80 - 91)  BP: 152/85 (18 Nov 2017 21:04) (148/87 - 162/87)  BP(mean): --  ABP: --  ABP(mean): --  RR: 19 (18 Nov 2017 21:04) (17 - 19)  SpO2: 97% (18 Nov 2017 21:04) (94% - 97%)          PHYSICAL EXAM:   Constitutional: NAD, awake and alert, well-developed  HEENT: PERR, EOMI, Normal Hearing, MMM  Neck: Soft and supple, No LAD, No JVD  Respiratory: Breath sounds are clear bilaterally, No wheezing, rales or rhonchi  Cardiovascular: S1 and S2, regular rate and rhythm, no Murmurs, gallops or rubs  Gastrointestinal: Bowel Sounds present, soft, nontender, nondistended, no guarding, no rebound  Extremities: No peripheral edema  Vascular: 2+ peripheral pulses  Neurological: A/O x 3, no focal deficits  Musculoskeletal: 5/5 strength b/l upper and lower extremities  Skin: No rashes    MEDICATIONS:  MEDICATIONS  (STANDING):  ciprofloxacin   IVPB 400 milliGRAM(s) IV Intermittent every 12 hours  enoxaparin Injectable 40 milliGRAM(s) SubCutaneous every 12 hours  influenza   Vaccine 0.5 milliLiter(s) IntraMuscular once  metoprolol    tartrate Injectable 5 milliGRAM(s) IV Push every 6 hours  pantoprazole  Injectable 40 milliGRAM(s) IV Push daily  sodium chloride 0.9%. 1000 milliLiter(s) (100 mL/Hr) IV Continuous <Continuous>  sodium chloride 0.9%. 1000 milliLiter(s) (125 mL/Hr) IV Continuous <Continuous>      LABS: All Labs Reviewed:                                     9.3    4.0   )-----------( 210      ( 18 Nov 2017 06:19 )             29.6   11-18    139  |  97  |  23  ----------------------------<  105<H>  3.3<L>   |  35<H>  |  1.21    Ca    9.3      18 Nov 2017 06:19    TPro  7.5  /  Alb  3.0<L>  /  TBili  0.3  /  DBili  x   /  AST  22  /  ALT  14  /  AlkPhos  76  11-17          Blood Culture: Organism --  Gram Stain Blood -- Gram Stain --  Specimen Source .Urine None  Culture-Blood --    EKG: SR,. no signficiant STchanges    Telemetry: SR

## 2017-11-19 NOTE — PROGRESS NOTE ADULT - ASSESSMENT
This is a 67 y.o female with history of HTN/HLD, PE diagnosed in  (Xarelto 20 mg), perforated sigmoid colon with s/p Reymundo procedure 2016 and attempted to reverse in 2017 but new tumor found, ovarian cancer diagnosed in  currently on chemo (last session "1st week of November" with h/o SBO presents with vomiting and not passing stool via fistula x one day. Currently being treated for UTI (cipro 400 mg q12h), positive MRSA and on contact isolation. She reports that initially Dr. Braxton Diaz had placed her on lovenox when PE first diagnosed in 2016, and then Dr. Steve Nunez from [facility] [place] had transitioned her to Xarelto 20 mg. Attempted to call Dr. Nunez [phone] but voicemail box full to discuss about Xarelto and repeat CT chest imaging. Pt also states that she doesn't want long term injections, although it is recommended for pts with malignant cancer. Today, NGT removed, and started on clear liquid diet. Will continue treatment dose lovenox until pt tolerating food for absorption of medication.     PLAN:  - c/w treatment dose lovenox 40 mg SQ q12h (please avoid abdomen). Pt with IMPROVE VTE score of 4  - recommend f/u with Dr. Nunez from [facility] to review recent CT chest if PE resolved  - d/t high thrombosis risk, pt may resume Xarelto 20 mg once cleared diet consumption as per primary team  - monitor s/s bleeding, daily CBC/BMP. Consider transfusion if Hgb < 7 (admission Hgb 13.1->9.3 today)  - encourage ambulation   - maintain b/l venodynes  - avoid CY if and when pt on Xarelto (e.g. ketoconazole, itraconazole, ritonavir, indinavir, conivaptan) increase the serum concentration of rivaroxaban, (e.g. rifampin, carbamazepine, phenytoin, [place]’s Wort) reduce the serum concentration of rivaroxaban, (e.g. diltiazem, verapamil, dronedarone, erythromycin) or Clarithromycin may increase the serum concentration of rivaroxaban     Will continue to follow. Please call [phone] for this weekend.

## 2017-11-19 NOTE — CONSULT NOTE ADULT - ASSESSMENT
67 F with ovarian cancer  s/p surgery, sigmoid colon perforation s/p partial colectomy presetns with SBO, currently with NGT and ostomy       Hx of PE, was on Xarelto, but now on Lovenox BID weight based , due to NPO status     continue hydration    continue metoprolol IV , until  NGT out         will follow as needed

## 2017-11-20 ENCOUNTER — TRANSCRIPTION ENCOUNTER (OUTPATIENT)
Age: 67
End: 2017-11-20

## 2017-11-20 LAB
ANION GAP SERPL CALC-SCNC: 8 MMOL/L — SIGNIFICANT CHANGE UP (ref 5–17)
BUN SERPL-MCNC: 14 MG/DL — SIGNIFICANT CHANGE UP (ref 7–23)
CALCIUM SERPL-MCNC: 8 MG/DL — LOW (ref 8.5–10.1)
CHLORIDE SERPL-SCNC: 104 MMOL/L — SIGNIFICANT CHANGE UP (ref 96–108)
CO2 SERPL-SCNC: 29 MMOL/L — SIGNIFICANT CHANGE UP (ref 22–31)
CREAT SERPL-MCNC: 0.92 MG/DL — SIGNIFICANT CHANGE UP (ref 0.5–1.3)
GLUCOSE SERPL-MCNC: 108 MG/DL — HIGH (ref 70–99)
HCT VFR BLD CALC: 30.9 % — LOW (ref 34.5–45)
HGB BLD-MCNC: 10 G/DL — LOW (ref 11.5–15.5)
MCHC RBC-ENTMCNC: 32.4 GM/DL — SIGNIFICANT CHANGE UP (ref 32–36)
MCHC RBC-ENTMCNC: 33 PG — SIGNIFICANT CHANGE UP (ref 27–34)
MCV RBC AUTO: 101.8 FL — HIGH (ref 80–100)
PLATELET # BLD AUTO: 236 K/UL — SIGNIFICANT CHANGE UP (ref 150–400)
POTASSIUM SERPL-MCNC: 3.3 MMOL/L — LOW (ref 3.5–5.3)
POTASSIUM SERPL-SCNC: 3.3 MMOL/L — LOW (ref 3.5–5.3)
RBC # BLD: 3.04 M/UL — LOW (ref 3.8–5.2)
RBC # FLD: 15.6 % — HIGH (ref 10.3–14.5)
SODIUM SERPL-SCNC: 141 MMOL/L — SIGNIFICANT CHANGE UP (ref 135–145)
WBC # BLD: 2.8 K/UL — LOW (ref 3.8–10.5)
WBC # FLD AUTO: 2.8 K/UL — LOW (ref 3.8–10.5)

## 2017-11-20 PROCEDURE — 99233 SBSQ HOSP IP/OBS HIGH 50: CPT

## 2017-11-20 RX ORDER — PHENAZOPYRIDINE HCL 100 MG
100 TABLET ORAL EVERY 8 HOURS
Qty: 0 | Refills: 0 | Status: DISCONTINUED | OUTPATIENT
Start: 2017-11-20 | End: 2017-11-20

## 2017-11-20 RX ORDER — OXYCODONE HYDROCHLORIDE 5 MG/1
5 TABLET ORAL EVERY 4 HOURS
Qty: 0 | Refills: 0 | Status: DISCONTINUED | OUTPATIENT
Start: 2017-11-20 | End: 2017-11-20

## 2017-11-20 RX ORDER — OXYCODONE HYDROCHLORIDE 5 MG/1
10 TABLET ORAL EVERY 4 HOURS
Qty: 0 | Refills: 0 | Status: DISCONTINUED | OUTPATIENT
Start: 2017-11-20 | End: 2017-11-21

## 2017-11-20 RX ORDER — POTASSIUM CHLORIDE 20 MEQ
20 PACKET (EA) ORAL ONCE
Qty: 0 | Refills: 0 | Status: COMPLETED | OUTPATIENT
Start: 2017-11-20 | End: 2017-11-20

## 2017-11-20 RX ORDER — POTASSIUM CHLORIDE 20 MEQ
10 PACKET (EA) ORAL ONCE
Qty: 0 | Refills: 0 | Status: DISCONTINUED | OUTPATIENT
Start: 2017-11-20 | End: 2017-11-20

## 2017-11-20 RX ORDER — PHENAZOPYRIDINE HCL 100 MG
100 TABLET ORAL EVERY 8 HOURS
Qty: 0 | Refills: 0 | Status: DISCONTINUED | OUTPATIENT
Start: 2017-11-20 | End: 2017-11-21

## 2017-11-20 RX ORDER — OXYCODONE HYDROCHLORIDE 5 MG/1
10 TABLET ORAL ONCE
Qty: 0 | Refills: 0 | Status: DISCONTINUED | OUTPATIENT
Start: 2017-11-20 | End: 2017-11-20

## 2017-11-20 RX ORDER — RIVAROXABAN 15 MG-20MG
20 KIT ORAL EVERY 24 HOURS
Qty: 0 | Refills: 0 | Status: DISCONTINUED | OUTPATIENT
Start: 2017-11-20 | End: 2017-11-21

## 2017-11-20 RX ORDER — HYDROMORPHONE HYDROCHLORIDE 2 MG/ML
0.5 INJECTION INTRAMUSCULAR; INTRAVENOUS; SUBCUTANEOUS ONCE
Qty: 0 | Refills: 0 | Status: DISCONTINUED | OUTPATIENT
Start: 2017-11-20 | End: 2017-11-20

## 2017-11-20 RX ORDER — CARVEDILOL PHOSPHATE 80 MG/1
6.25 CAPSULE, EXTENDED RELEASE ORAL EVERY 12 HOURS
Qty: 0 | Refills: 0 | Status: DISCONTINUED | OUTPATIENT
Start: 2017-11-20 | End: 2017-11-21

## 2017-11-20 RX ADMIN — OXYCODONE HYDROCHLORIDE 10 MILLIGRAM(S): 5 TABLET ORAL at 16:44

## 2017-11-20 RX ADMIN — Medication 100 MILLIGRAM(S): at 18:14

## 2017-11-20 RX ADMIN — HYDROMORPHONE HYDROCHLORIDE 0.5 MILLIGRAM(S): 2 INJECTION INTRAMUSCULAR; INTRAVENOUS; SUBCUTANEOUS at 04:25

## 2017-11-20 RX ADMIN — ENOXAPARIN SODIUM 40 MILLIGRAM(S): 100 INJECTION SUBCUTANEOUS at 05:08

## 2017-11-20 RX ADMIN — Medication 5 MILLIGRAM(S): at 05:08

## 2017-11-20 RX ADMIN — Medication 200 MILLIGRAM(S): at 05:08

## 2017-11-20 RX ADMIN — OXYCODONE HYDROCHLORIDE 10 MILLIGRAM(S): 5 TABLET ORAL at 22:15

## 2017-11-20 RX ADMIN — Medication 100 MILLIGRAM(S): at 21:23

## 2017-11-20 RX ADMIN — Medication 100 MILLIGRAM(S): at 13:54

## 2017-11-20 RX ADMIN — CARVEDILOL PHOSPHATE 6.25 MILLIGRAM(S): 80 CAPSULE, EXTENDED RELEASE ORAL at 18:14

## 2017-11-20 RX ADMIN — OXYCODONE HYDROCHLORIDE 5 MILLIGRAM(S): 5 TABLET ORAL at 07:32

## 2017-11-20 RX ADMIN — HYDROMORPHONE HYDROCHLORIDE 0.5 MILLIGRAM(S): 2 INJECTION INTRAMUSCULAR; INTRAVENOUS; SUBCUTANEOUS at 08:24

## 2017-11-20 RX ADMIN — HYDROMORPHONE HYDROCHLORIDE 0.5 MILLIGRAM(S): 2 INJECTION INTRAMUSCULAR; INTRAVENOUS; SUBCUTANEOUS at 04:40

## 2017-11-20 RX ADMIN — OXYCODONE HYDROCHLORIDE 10 MILLIGRAM(S): 5 TABLET ORAL at 12:21

## 2017-11-20 RX ADMIN — Medication 20 MILLIEQUIVALENT(S): at 12:21

## 2017-11-20 RX ADMIN — PANTOPRAZOLE SODIUM 40 MILLIGRAM(S): 20 TABLET, DELAYED RELEASE ORAL at 12:20

## 2017-11-20 RX ADMIN — HYDROMORPHONE HYDROCHLORIDE 0.5 MILLIGRAM(S): 2 INJECTION INTRAMUSCULAR; INTRAVENOUS; SUBCUTANEOUS at 09:35

## 2017-11-20 RX ADMIN — HYDROMORPHONE HYDROCHLORIDE 0.5 MILLIGRAM(S): 2 INJECTION INTRAMUSCULAR; INTRAVENOUS; SUBCUTANEOUS at 00:17

## 2017-11-20 RX ADMIN — RIVAROXABAN 20 MILLIGRAM(S): KIT at 18:14

## 2017-11-20 RX ADMIN — HYDROMORPHONE HYDROCHLORIDE 0.5 MILLIGRAM(S): 2 INJECTION INTRAMUSCULAR; INTRAVENOUS; SUBCUTANEOUS at 00:32

## 2017-11-20 RX ADMIN — OXYCODONE HYDROCHLORIDE 10 MILLIGRAM(S): 5 TABLET ORAL at 03:34

## 2017-11-20 RX ADMIN — OXYCODONE HYDROCHLORIDE 10 MILLIGRAM(S): 5 TABLET ORAL at 02:49

## 2017-11-20 NOTE — DISCHARGE NOTE ADULT - PATIENT PORTAL LINK FT
“You can access the [facility] Patient Portal, offered by [facility], by registering with the following website: [email or web address]”

## 2017-11-20 NOTE — PROGRESS NOTE ADULT - ASSESSMENT
patient now taking PO....may switch to PO metoprolol and d/c telemetry for transfer to !N or d/c depending on surgery  Needed breakthrough pain meds-long acting oxycontin not effective, needs short acting for breakthrough  No active cardiac issues patient now taking PO....may switch to PO coreg and then d/c telemetry for transfer to !N or d/c depending on surgery  Needed breakthrough pain meds-long acting oxycontin not effective, needs short acting for breakthrough  change metroporolol 5mg IVP q 6 to coreg 6.25mg PO BID if not NPO today  No active cardiac issues

## 2017-11-20 NOTE — DISCHARGE NOTE ADULT - CARE PROVIDER_API CALL
Sloan Zhou), ColonRectal Surgery; Surgery  [address] [place] [address]  Phone: [phone]  Fax: [phone]

## 2017-11-20 NOTE — PROGRESS NOTE ADULT - ASSESSMENT
This is a 67 y.o female with history of HTN/HLD, PE diagnosed in  (Xarelto 20 mg), perforated sigmoid colon with s/p Reymundo procedure 2016 and attempted to reverse in 2017 but new tumor found, ovarian cancer diagnosed in  currently on chemo (last session "1st week of November" with h/o SBO presents with vomiting and not passing stool via fistula x one day. Currently being treated for UTI (cipro 400 mg q12h), positive MRSA and on contact isolation. She reports that initially Dr. Braxton Diaz had placed her on lovenox when PE first diagnosed in 2016, and then [place] [facility] to Xarelto 20 mg. Attempted to call Dr. Nunez [phone] but voicemail box full to discuss about Xarelto and repeat CT chest imaging. Pt also states that she doesn't want long term injections, although it is recommended for pts with malignant cancer. Today, NGT removed, and started on clear liquid diet. Will continue treatment dose lovenox until pt tolerating food for absorption of medication.   17 Pt now tolerating low residue diet, Discussed with surg RYAN Sawyer and he states it is fine with Dr Turner to resume xarelto.  PLAN:  - dc lovenox   : resume Xarelto 20mg po daily  - [place] [facility] [place] if PE resolved  - monitor s/s bleeding, daily CBC/BMP.   - encourage ambulation   - maintain b/l venodynes  - avoid CY if and when pt on Xarelto (e.g. ketoconazole, itraconazole, ritonavir, indinavir, conivaptan) increase the serum concentration of rivaroxaban, (e.g. rifampin, carbamazepine, phenytoin, [place]’s Wort) reduce the serum concentration of rivaroxaban, (e.g. diltiazem, verapamil, dronedarone, erythromycin) or Clarithromycin may increase the serum concentration of rivaroxaban     Will continue to follow.

## 2017-11-20 NOTE — CHART NOTE - NSCHARTNOTEFT_GEN_A_CORE
spoke with Dr. Connolly about pts urine culture will start doxycycline  and discontinue iv cipro spoke with Dr. Connolly about pts urine culture will start doxycycline po and give for 7 days  and discontinue iv cipro

## 2017-11-20 NOTE — DISCHARGE NOTE ADULT - CARE PLAN
Principal Discharge DX:	SBO (small bowel obstruction)  Goal:	low residue diet  Instructions for follow-up, activity and diet:	low residue diet, follow up with dr held, asap

## 2017-11-21 VITALS — SYSTOLIC BLOOD PRESSURE: 112 MMHG | DIASTOLIC BLOOD PRESSURE: 56 MMHG

## 2017-11-21 LAB
-  AMPICILLIN/SULBACTAM: SIGNIFICANT CHANGE UP
-  AMPICILLIN: SIGNIFICANT CHANGE UP
-  CEFAZOLIN: SIGNIFICANT CHANGE UP
-  CIPROFLOXACIN: SIGNIFICANT CHANGE UP
-  CIPROFLOXACIN: SIGNIFICANT CHANGE UP
-  DAPTOMYCIN: SIGNIFICANT CHANGE UP
-  GENTAMICIN: SIGNIFICANT CHANGE UP
-  LEVOFLOXACIN: SIGNIFICANT CHANGE UP
-  LINEZOLID: SIGNIFICANT CHANGE UP
-  NITROFURANTOIN: SIGNIFICANT CHANGE UP
-  NITROFURANTOIN: SIGNIFICANT CHANGE UP
-  OXACILLIN: SIGNIFICANT CHANGE UP
-  PENICILLIN: SIGNIFICANT CHANGE UP
-  RIFAMPIN: SIGNIFICANT CHANGE UP
-  TETRACYCLINE: SIGNIFICANT CHANGE UP
-  TETRACYCLINE: SIGNIFICANT CHANGE UP
-  TRIMETHOPRIM/SULFAMETHOXAZOLE: SIGNIFICANT CHANGE UP
-  VANCOMYCIN: SIGNIFICANT CHANGE UP
-  VANCOMYCIN: SIGNIFICANT CHANGE UP
CULTURE RESULTS: SIGNIFICANT CHANGE UP
METHOD TYPE: SIGNIFICANT CHANGE UP
METHOD TYPE: SIGNIFICANT CHANGE UP
ORGANISM # SPEC MICROSCOPIC CNT: SIGNIFICANT CHANGE UP
SPECIMEN SOURCE: SIGNIFICANT CHANGE UP

## 2017-11-21 RX ADMIN — CARVEDILOL PHOSPHATE 6.25 MILLIGRAM(S): 80 CAPSULE, EXTENDED RELEASE ORAL at 06:26

## 2017-11-21 RX ADMIN — Medication 3 MILLIGRAM(S): at 00:12

## 2017-11-21 RX ADMIN — OXYCODONE HYDROCHLORIDE 10 MILLIGRAM(S): 5 TABLET ORAL at 03:47

## 2017-11-21 RX ADMIN — Medication 100 MILLIGRAM(S): at 06:26

## 2017-11-21 RX ADMIN — OXYCODONE HYDROCHLORIDE 10 MILLIGRAM(S): 5 TABLET ORAL at 06:24

## 2017-11-21 RX ADMIN — OXYCODONE HYDROCHLORIDE 10 MILLIGRAM(S): 5 TABLET ORAL at 11:54

## 2017-11-21 RX ADMIN — Medication 100 MILLIGRAM(S): at 14:57

## 2017-11-21 RX ADMIN — OXYCODONE HYDROCHLORIDE 10 MILLIGRAM(S): 5 TABLET ORAL at 14:57

## 2017-11-21 RX ADMIN — OXYCODONE HYDROCHLORIDE 10 MILLIGRAM(S): 5 TABLET ORAL at 01:10

## 2017-11-21 RX ADMIN — OXYCODONE HYDROCHLORIDE 10 MILLIGRAM(S): 5 TABLET ORAL at 07:55

## 2017-11-21 RX ADMIN — PANTOPRAZOLE SODIUM 40 MILLIGRAM(S): 20 TABLET, DELAYED RELEASE ORAL at 11:54

## 2017-11-21 RX ADMIN — INFLUENZA VIRUS VACCINE 0.5 MILLILITER(S): 15; 15; 15; 15 SUSPENSION INTRAMUSCULAR at 10:23

## 2017-11-21 RX ADMIN — OXYCODONE HYDROCHLORIDE 10 MILLIGRAM(S): 5 TABLET ORAL at 00:12

## 2017-11-21 RX ADMIN — OXYCODONE HYDROCHLORIDE 10 MILLIGRAM(S): 5 TABLET ORAL at 04:54

## 2017-11-21 RX ADMIN — ONDANSETRON 4 MILLIGRAM(S): 8 TABLET, FILM COATED ORAL at 15:53

## 2017-11-21 NOTE — PROGRESS NOTE ADULT - PROBLEM SELECTOR PLAN 1
dc ngt  advance diet to clears  oob
NGT to [facility]  OOB  monitor clinically
follow up with  held as outpatient   dc home with doxy for MRSA UTI  dc home
low residue diet  dc home if ana diet

## 2017-11-21 NOTE — PROGRESS NOTE ADULT - SUBJECTIVE AND OBJECTIVE BOX
The patient is doing well without complaints.  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  Her ngt output was 300 overnight.      Vital Signs Last 24 Hrs  T(C): 36.8 (18 Nov 2017 21:04), Max: 36.8 (18 Nov 2017 21:04)  T(F): 98.2 (18 Nov 2017 21:04), Max: 98.2 (18 Nov 2017 21:04)  HR: 84 (18 Nov 2017 21:04) (84 - 91)  BP: 152/85 (18 Nov 2017 21:04) (152/85 - 162/87)  BP(mean): --  RR: 19 (18 Nov 2017 21:04) (18 - 19)  SpO2: 97% (18 Nov 2017 21:04) (96% - 97%)    PHYSICAL EXAM:  General: NAD.  HEENT: no JVD, no jaundice.  LUNGS: CTAB.  Heart: S1 S2 RRR  Abd: soft nt/nd                           9.3    4.0   )-----------( 210      ( 18 Nov 2017 06:19 )             29.6       11-18    139  |  97  |  23  ----------------------------<  105<H>  3.3<L>   |  35<H>  |  1.21    Ca    9.3      18 Nov 2017 06:19    TPro  7.5  /  Alb  3.0<L>  /  TBili  0.3  /  DBili  x   /  AST  22  /  ALT  14  /  AlkPhos  76  11-17      PT/INR - ( 17 Nov 2017 17:51 )   PT: 27.0 sec;   INR: 2.45 ratio         PTT - ( 18 Nov 2017 06:19 )  PTT:31.6 sec
CHIEF COMPLAINT: Patient is a 67y old  Female who presents with a chief complaint of no ostomy output (17 Nov 2017 23:53)      HPI:  67 y.o female with history of ovarian cancer diagnosed in 2016 currently on chem with h/o sbo presents with vomiting and not passing stool via fistula x one day.  pt denies fever. Pt has history of hartmans in 2016 that was attempted to be reversed in 2017 but reversal was not done because cancer was found.  That wound broke down and pt developed a fistula that has been drining stool until today.  PT also is on xeralto fo history of PE (2016) and is currently being treated for UTI.  Pt states she had a SBO 11 weeks ago and it presented the same way. (17 Nov 2017 21:25)    11/20-still with abdominal pains.  Some nausea with jello last night.  Having ostomy output, but stool is firmer.  Patient feels she wants to go home  Needed breakthrough pain meds last night    PMHx: PAST MEDICAL & SURGICAL HISTORY:  Perforation of sigmoid colon: s/p Reymundo procedure 4/2016  Ovarian cancer  Other acute pulmonary embolism  Essential hypertension, hypertension with unspecified goal  Malignant neoplasm of ovary, unspecified laterality  History of conization of cervix: 1984  Uterus disorder: removed in 2003, due to fibroids  H/O skin graft: to abdominal wound (11/2016)  S/P cholecystectomy: 2011  H/O abdominal hysterectomy: removal of ovaries, tubes ,omentum,radical debulking  Status post Reymundo procedure: April 2016      Allergies: Allergies    acetaminophen (Hives)  Bananas (Unknown)  ibuprofen (Other)  Lexapro (Unknown)  Originally Entered as [Unknown] reaction to [cantelope] (Unknown)  Originally Entered as [Unknown] reaction to [cherries] (Unknown)  Peaches (Unknown)  wallnuts, paches, bananas, honedew and cantalope (Hives; Short breath)  walnut (Stomach Upset (Severe))  Wellbutrin (Hives)    Intolerances          REVIEW OF SYSTEMS:  RESPIRATORY: No cough, wheezing, hemoptysis; No shortness of breath  CARDIOVASCULAR: No chest pain or palpitations  GASTROINTESTINAL: abdominal or epigastric pain.  nausea,     Vital Signs Last 24 Hrs  T(C): 36.9 (20 Nov 2017 04:24), Max: 37 (19 Nov 2017 10:10)  T(F): 98.4 (20 Nov 2017 04:24), Max: 98.6 (19 Nov 2017 10:10)  HR: 74 (20 Nov 2017 05:07) (66 - 88)  BP: 157/76 (20 Nov 2017 05:07) (129/66 - 176/69)  BP(mean): --  RR: 18 (20 Nov 2017 04:24) (18 - 19)  SpO2: 97% (20 Nov 2017 04:24) (96% - 100%)    I&O's Summary    18 Nov 2017 07:01  -  19 Nov 2017 07:00  --------------------------------------------------------  IN: 0 mL / OUT: 700 mL / NET: -700 mL            PHYSICAL EXAM:   Constitutional: NAD, awake and alert, well-developed  HEENT: PERR, EOMI, Normal Hearing, MMM  Neck: Soft and supple, No LAD, No JVD  Respiratory: Breath sounds are clear bilaterally, No wheezing, rales or rhonchi  Cardiovascular: S1 and S2, regular rate and rhythm, no Murmurs, gallops or rubs  Gastrointestinal: Bowel Sounds decreased with tenderness, guarding    MEDICATIONS:  MEDICATIONS  (STANDING):  ciprofloxacin   IVPB 400 milliGRAM(s) IV Intermittent every 12 hours  enoxaparin Injectable 40 milliGRAM(s) SubCutaneous every 12 hours  influenza   Vaccine 0.5 milliLiter(s) IntraMuscular once  melatonin 3 milliGRAM(s) Oral at bedtime  metoprolol    tartrate Injectable 5 milliGRAM(s) IV Push every 6 hours  pantoprazole  Injectable 40 milliGRAM(s) IV Push daily  sodium chloride 0.9%. 1000 milliLiter(s) (100 mL/Hr) IV Continuous <Continuous>  sodium chloride 0.9%. 1000 milliLiter(s) (125 mL/Hr) IV Continuous <Continuous>      LABS: All Labs Reviewed:                Blood Culture: Organism Methicillin resistant Staphylococcus aureus  Gram Stain Blood -- Gram Stain --  Specimen Source .Urine None  Culture-Blood --          BNP     RADIOLOGY:    EKG:      Telemetry:  sinus rhythm,     ECHO:
HPI:  67 y.o female with history of HTN/HLD, PE diagnosed in 2016 (Xarelto 20 mg), perforated sigmoid colon with s/p Reymundo procedure 4/2016 and attempted to reverse in 4/2017 but new tumor found, ovarian cancer diagnosed in 2016 currently on chemo (last session "1st week of November" with h/o SBO presents with vomiting and not passing stool via fistula x one day. Currently being treated for UTI. Pt states she had a SBO 11 weeks ago and it presented the same way. (17 Nov 2017 21:25)      Patient is a 67y old  Female who presents with a chief complaint of no ostomy output (17 Nov 2017 23:53)      Consulted by Dr. Turner for VTE prophylaxis, risk stratification, and anticoagulation management.    PAST MEDICAL & SURGICAL HISTORY:  Perforation of sigmoid colon: s/p Reymundo procedure 4/2016  Ovarian cancer  Other acute pulmonary embolism  Essential hypertension, hypertension with unspecified goal  Malignant neoplasm of ovary, unspecified laterality  History of conization of cervix: 1984  Uterus disorder: removed in 2003, due to fibroids  H/O skin graft: to abdominal wound (11/2016)  S/P cholecystectomy: 2011  H/O abdominal hysterectomy: removal of ovaries, tubes ,omentum,radical debulking  Status post Reymundo procedure: April 2016    BMI: 17.9    CrCL: 32.7    IMPROVE VTE Risk Score: 4; high    IMPROVE Bleeding Risk Score: 5.5    Falls Risk:   High (  )  Mod (  )  Low ( X )    11/18/17: Pt seen at bedside, on contact isolation for + MRSA urine, UTI, appears nervous and anxious. She reports "I've very upset and scared, I looked myself in mirror and upset how much weight I've lost." Pt on NGT with 700 cc of green fluid in suction container.     FAMILY HISTORY:  She reports that her paternal grandfather past away from "heart attack."  No pertinent family history in first degree relatives  No pertinent family history in first degree relatives    Denies any personal or familial history of clotting or bleeding disorders.    Allergies  acetaminophen (Hives)  Bananas (Unknown)  ibuprofen (Other)  Lexapro (Unknown)  Originally Entered as [Unknown] reaction to [cantelope] (Unknown)  Originally Entered as [Unknown] reaction to [cherries] (Unknown)  Peaches (Unknown)  wallnuts, paches, bananas, honedew and cantalope (Hives; Short breath)  walnut (Stomach Upset (Severe))  Wellbutrin (Hives)    Intolerances    REVIEW OF SYSTEMS    (  )Fever	     (  )Constipation	(  )SOB			(  )Headache	(  )Dysuria  (  )Chills	     (  )Melena	(  )Dyspnea present on exertion    (  )Dizziness                    (  )Polyuria  (  )Nausea	     (  )Hematochezia	(  )Cough		                    (  )Syncope   	(  )Hematuria  (  )Vomiting    (  )Chest Pain	(  )Wheezing		(  )Weakness  (  )Diarrhea     (  )Palpitations	(  )Anorexia		(  )Myalgia    All other review of systems negative: Yes    PHYSICAL EXAM:    Constitutional: Appears nervous and anxious    Neurological: A& O x 3    Skin: Warm, clean dressing to her left forearm    Respiratory and Thorax: normal effort; Breath sounds: normal; No rales/wheezing/rhonchi  	  Cardiovascular: S1, S2, regular, NMBR	    Gastrointestinal: BS + x 4Q, tender in her LUQ, noted colostomy bag with red stoma, and clean bandage to her prior stoma, hernia	    Genitourinary:  Bladder nondistended, nontender    Musculoskeletal:   General Right:   no muscle/joint tenderness,   normal tone, no joint swelling,   ROM: full	    General Left:   no muscle/joint tenderness,   normal tone, no joint swelling,   ROM: full    Lower extrems:   Right: no calf tenderness              negative anselmo's sign               + pedal pulses    Left:   no calf tenderness              negative anselmo's sign               + pedal pulses                            9.3    4.0   )-----------( 210      ( 18 Nov 2017 06:19 )             29.6       11-18    139  |  97  |  23  ----------------------------<  105<H>  3.3<L>   |  35<H>  |  1.21    Ca    9.3      18 Nov 2017 06:19    TPro  7.5  /  Alb  3.0<L>  /  TBili  0.3  /  DBili  x   /  AST  22  /  ALT  14  /  AlkPhos  76  11-17    PT/INR - ( 17 Nov 2017 17:51 )   PT: 27.0 sec;   INR: 2.45 ratio    PTT - ( 18 Nov 2017 06:19 )  PTT:31.6 sec				    MEDICATIONS  (STANDING):  ciprofloxacin   IVPB 400 milliGRAM(s) IV Intermittent every 12 hours  enoxaparin Injectable 40 milliGRAM(s) SubCutaneous every 12 hours  influenza   Vaccine 0.5 milliLiter(s) IntraMuscular once  metoprolol    tartrate Injectable 5 milliGRAM(s) IV Push every 6 hours  pantoprazole  Injectable 40 milliGRAM(s) IV Push daily  sodium chloride 0.9%. 1000 milliLiter(s) (100 mL/Hr) IV Continuous <Continuous>  sodium chloride 0.9%. 1000 milliLiter(s) (125 mL/Hr) IV Continuous <Continuous>    Vital Signs Last 24 Hrs  T(C): 36.4 (18 Nov 2017 17:11), Max: 36.7 (17 Nov 2017 20:00)  T(F): 97.6 (18 Nov 2017 17:11), Max: 98.1 (17 Nov 2017 22:12)  HR: 91 (18 Nov 2017 17:11) (80 - 97)  BP: 162/87 (18 Nov 2017 17:11) (147/77 - 167/95)  BP(mean): --  RR: 18 (18 Nov 2017 17:11) (16 - 20)  SpO2: 96% (18 Nov 2017 17:11) (94% - 98%)    DVT Prophylaxis:  LMWH                   ( X )  Heparin SQ           (  )  Coumadin             (  )  Xarelto                  (  )  Eliquis                   (  )  Venodynes           ( X )  Ambulation          ( X )  UFH                       (  )  Contraindicated  (  )
HPI:  67 y.o female with history of HTN/HLD, PE diagnosed in 2016 (Xarelto 20 mg), perforated sigmoid colon with s/p Reymundo procedure 4/2016 and attempted to reverse in 4/2017 but new tumor found, ovarian cancer diagnosed in 2016 currently on chemo (last session "1st week of November" with h/o SBO presents with vomiting and not passing stool via fistula x one day. Currently being treated for UTI. Pt states she had a SBO 11 weeks ago and it presented the same way. (17 Nov 2017 21:25)      Patient is a 67y old  Female who presents with a chief complaint of no ostomy output (17 Nov 2017 23:53)      Consulted by Dr. Turner for VTE prophylaxis, risk stratification, and anticoagulation management.    PAST MEDICAL & SURGICAL HISTORY:  Perforation of sigmoid colon: s/p Reymundo procedure 4/2016  Ovarian cancer  Other acute pulmonary embolism  Essential hypertension, hypertension with unspecified goal  Malignant neoplasm of ovary, unspecified laterality  History of conization of cervix: 1984  Uterus disorder: removed in 2003, due to fibroids  H/O skin graft: to abdominal wound (11/2016)  S/P cholecystectomy: 2011  H/O abdominal hysterectomy: removal of ovaries, tubes ,omentum,radical debulking  Status post Reymundo procedure: April 2016    BMI: 17.9    CrCL: 32.7    IMPROVE VTE Risk Score: 4; high    IMPROVE Bleeding Risk Score: 5.5    Falls Risk:   High (  )  Mod (  )  Low ( X )    11/18/17: Pt seen at bedside, on contact isolation for + MRSA urine, UTI, appears nervous and anxious. She reports "I've very upset and scared, I looked myself in mirror and upset how much weight I've lost." Pt on NGT with 700 cc of green fluid in suction container.   11/19/17: Pt seen at bedside, with her . Contact isolation for + MRSA urine, UTI. Appears more relaxed and comfortable today. NGT removed and started clear liquid diet. Called Dr. Nunez [phone] and voicemail box full. Wanted to discuss about Xarelto, because lovenox is more effective in patient with malignant cancer. However, pt also states that she doesn't want long term lovenox injections. No morning labwork.   11-20-17 pt tawny t bedside on 3n.  Discussed switching her back to Xarelto.  Pt states she has tolerated her diet well.  no concerns about going back on Xarelto     FAMILY HISTORY:  She reports that her paternal grandfather past away from "heart attack."  No pertinent family history in first degree relatives  No pertinent family history in first degree relatives    Denies any personal or familial history of clotting or bleeding disorders.    Allergies  acetaminophen (Hives)  Bananas (Unknown)  ibuprofen (Other)  Lexapro (Unknown)  Originally Entered as [Unknown] reaction to [cantelope] (Unknown)  Originally Entered as [Unknown] reaction to [cherries] (Unknown)  Peaches (Unknown)  wallnuts, paches, bananas, honedew and cantalope (Hives; Short breath)  walnut (Stomach Upset (Severe))  Wellbutrin (Hives)    Intolerances    REVIEW OF SYSTEMS    (  )Fever	     (  )Constipation	(  )SOB			(  )Headache	(  )Dysuria  (  )Chills	     (  )Melena	(  )Dyspnea present on exertion    (  )Dizziness                    (  )Polyuria  (  )Nausea	     (  )Hematochezia	(  )Cough		                    (  )Syncope   	(  )Hematuria  (  )Vomiting    (  )Chest Pain	(  )Wheezing		(  )Weakness  (  )Diarrhea     (  )Palpitations	(  )Anorexia		(  )Myalgia    All other review of systems negative: Yes    PHYSICAL EXAM:    Constitutional: Appears relaxed and comfortable today    Neurological: A& O x 3    Skin: Warm, clean dressing to her left forearm    Respiratory and Thorax: normal effort; Breath sounds: normal; No rales/wheezing/rhonchi  	  Cardiovascular: S1, S2, regular, NMBR	    Gastrointestinal: BS + x 4Q, tender in her LUQ, noted colostomy bag with red stoma and fecal material soft and brown , and clean bandage to her prior stoma, hernia	    Genitourinary:  Bladder nondistended, nontender    Musculoskeletal:   General Right:   no muscle/joint tenderness,   normal tone, no joint swelling,   ROM: full	    General Left:   no muscle/joint tenderness,   normal tone, no joint swelling,   ROM: full    Lower extrems:   Right: no calf tenderness              negative anselmo's sign               + pedal pulses    Left:   no calf tenderness              negative anselmo's sign               + pedal pulses                        10.0   2.8   )-----------( 236      ( 20 Nov 2017 09:30 )             30.9       11-20    141  |  104  |  14  ----------------------------<  108<H>  3.3<L>   |  29  |  0.92    Ca    8.0<L>      20 Nov 2017 09:30                      9.3    4.0   )-----------( 210      ( 18 Nov 2017 06:19 )             29.6       11-18    139  |  97  |  23  ----------------------------<  105<H>  3.3<L>   |  35<H>  |  1.21    Ca    9.3      18 Nov 2017 06:19    TPro  7.5  /  Alb  3.0<L>  /  TBili  0.3  /  DBili  x   /  AST  22  /  ALT  14  /  AlkPhos  76  11-17    PT/INR - ( 17 Nov 2017 17:51 )   PT: 27.0 sec;   INR: 2.45 ratio    PTT - ( 18 Nov 2017 06:19 )  PTT:31.6 sec				      MEDICATIONS  (STANDING):  carvedilol 6.25 milliGRAM(s) Oral every 12 hours  doxycycline hyclate Capsule 100 milliGRAM(s) Oral every 12 hours  influenza   Vaccine 0.5 milliLiter(s) IntraMuscular once  melatonin 3 milliGRAM(s) Oral at bedtime  oxyCODONE    IR 10 milliGRAM(s) Oral every 4 hours  pantoprazole  Injectable 40 milliGRAM(s) IV Push daily  phenazopyridine 100 milliGRAM(s) Oral every 8 hours  rivaroxaban 20 milliGRAM(s) Oral every 24 hours  sodium chloride 0.9%. 1000 milliLiter(s) IV Continuous <Continuous>      Vital Signs Last 24 Hrs  T(C): 36.5 (11-20-17 @ 10:35), Max: 36.9 (11-20-17 @ 04:24)  T(F): 97.7 (11-20-17 @ 10:35), Max: 98.4 (11-20-17 @ 04:24)  HR: 61 (11-20-17 @ 10:35) (61 - 84)  BP: 146/68 (11-20-17 @ 10:35) (129/66 - 170/87)  BP(mean): --  RR: 18 (11-20-17 @ 10:35) (18 - 18)  SpO2: 98% (11-20-17 @ 10:35) (96% - 100%)    DVT Prophylaxis:  LMWH                   (  )  Heparin SQ           (  )  Coumadin             (  )  Xarelto                  ( x)  Eliquis                   (  )  Venodynes           ( X )  Ambulation          ( X )  UFH                       (  )  Contraindicated  (  )
HPI:  67 y.o female with history of HTN/HLD, PE diagnosed in 2016 (Xarelto 20 mg), perforated sigmoid colon with s/p Reymundo procedure 4/2016 and attempted to reverse in 4/2017 but new tumor found, ovarian cancer diagnosed in 2016 currently on chemo (last session "1st week of November" with h/o SBO presents with vomiting and not passing stool via fistula x one day. Currently being treated for UTI. Pt states she had a SBO 11 weeks ago and it presented the same way. (17 Nov 2017 21:25)      Patient is a 67y old  Female who presents with a chief complaint of no ostomy output (17 Nov 2017 23:53)      Consulted by Dr. Turner for VTE prophylaxis, risk stratification, and anticoagulation management.    PAST MEDICAL & SURGICAL HISTORY:  Perforation of sigmoid colon: s/p Reymundo procedure 4/2016  Ovarian cancer  Other acute pulmonary embolism  Essential hypertension, hypertension with unspecified goal  Malignant neoplasm of ovary, unspecified laterality  History of conization of cervix: 1984  Uterus disorder: removed in 2003, due to fibroids  H/O skin graft: to abdominal wound (11/2016)  S/P cholecystectomy: 2011  H/O abdominal hysterectomy: removal of ovaries, tubes ,omentum,radical debulking  Status post Reymundo procedure: April 2016    BMI: 17.9    CrCL: 32.7    IMPROVE VTE Risk Score: 4; high    IMPROVE Bleeding Risk Score: 5.5    Falls Risk:   High (  )  Mod (  )  Low ( X )    11/18/17: Pt seen at bedside, on contact isolation for + MRSA urine, UTI, appears nervous and anxious. She reports "I've very upset and scared, I looked myself in mirror and upset how much weight I've lost." Pt on NGT with 700 cc of green fluid in suction container.   11/19/17: Pt seen at bedside, with her . Contact isolation for + MRSA urine, UTI. Appears more relaxed and comfortable today. NGT removed and started clear liquid diet. Called Dr. Nunez [phone] and voicemail box full. Wanted to discuss about Xarelto, because lovenox is more effective in patient with malignant cancer. However, pt also states that she doesn't want long term lovenox injections. No morning labwork.     FAMILY HISTORY:  She reports that her paternal grandfather past away from "heart attack."  No pertinent family history in first degree relatives  No pertinent family history in first degree relatives    Denies any personal or familial history of clotting or bleeding disorders.    Allergies  acetaminophen (Hives)  Bananas (Unknown)  ibuprofen (Other)  Lexapro (Unknown)  Originally Entered as [Unknown] reaction to [cantelope] (Unknown)  Originally Entered as [Unknown] reaction to [cherries] (Unknown)  Peaches (Unknown)  wallnuts, paches, bananas, honedew and cantalope (Hives; Short breath)  walnut (Stomach Upset (Severe))  Wellbutrin (Hives)    Intolerances    REVIEW OF SYSTEMS    (  )Fever	     (  )Constipation	(  )SOB			(  )Headache	(  )Dysuria  (  )Chills	     (  )Melena	(  )Dyspnea present on exertion    (  )Dizziness                    (  )Polyuria  (  )Nausea	     (  )Hematochezia	(  )Cough		                    (  )Syncope   	(  )Hematuria  (  )Vomiting    (  )Chest Pain	(  )Wheezing		(  )Weakness  (  )Diarrhea     (  )Palpitations	(  )Anorexia		(  )Myalgia    All other review of systems negative: Yes    PHYSICAL EXAM:    Constitutional: Appears relaxed and comfortable today    Neurological: A& O x 3    Skin: Warm, clean dressing to her left forearm    Respiratory and Thorax: normal effort; Breath sounds: normal; No rales/wheezing/rhonchi  	  Cardiovascular: S1, S2, regular, NMBR	    Gastrointestinal: BS + x 4Q, tender in her LUQ, noted colostomy bag with red stoma, and clean bandage to her prior stoma, hernia	    Genitourinary:  Bladder nondistended, nontender    Musculoskeletal:   General Right:   no muscle/joint tenderness,   normal tone, no joint swelling,   ROM: full	    General Left:   no muscle/joint tenderness,   normal tone, no joint swelling,   ROM: full    Lower extrems:   Right: no calf tenderness              negative anselmo's sign               + pedal pulses    Left:   no calf tenderness              negative anselmo's sign               + pedal pulses                        9.3    4.0   )-----------( 210      ( 18 Nov 2017 06:19 )             29.6       11-18    139  |  97  |  23  ----------------------------<  105<H>  3.3<L>   |  35<H>  |  1.21    Ca    9.3      18 Nov 2017 06:19    TPro  7.5  /  Alb  3.0<L>  /  TBili  0.3  /  DBili  x   /  AST  22  /  ALT  14  /  AlkPhos  76  11-17    PT/INR - ( 17 Nov 2017 17:51 )   PT: 27.0 sec;   INR: 2.45 ratio    PTT - ( 18 Nov 2017 06:19 )  PTT:31.6 sec				      MEDICATIONS  (STANDING):  ciprofloxacin   IVPB 400 milliGRAM(s) IV Intermittent every 12 hours  enoxaparin Injectable 40 milliGRAM(s) SubCutaneous every 12 hours  influenza   Vaccine 0.5 milliLiter(s) IntraMuscular once  metoprolol    tartrate Injectable 5 milliGRAM(s) IV Push every 6 hours  pantoprazole  Injectable 40 milliGRAM(s) IV Push daily  sodium chloride 0.9%. 1000 milliLiter(s) (100 mL/Hr) IV Continuous <Continuous>  sodium chloride 0.9%. 1000 milliLiter(s) (125 mL/Hr) IV Continuous <Continuous>    Vital Signs Last 24 Hrs  T(C): 37 (11-19-17 @ 10:10), Max: 37 (11-19-17 @ 10:10)  T(F): 98.6 (11-19-17 @ 10:10), Max: 98.6 (11-19-17 @ 10:10)  HR: 88 (11-19-17 @ 12:57) (68 - 91)  BP: 176/69 (11-19-17 @ 12:57) (152/72 - 176/69)  BP(mean): --  RR: 19 (11-19-17 @ 10:10) (18 - 19)  SpO2: 96% (11-19-17 @ 10:10) (96% - 97%)    DVT Prophylaxis:  LMWH                   ( X )  Heparin SQ           (  )  Coumadin             (  )  Xarelto                  (  )  Eliquis                   (  )  Venodynes           ( X )  Ambulation          ( X )  UFH                       (  )  Contraindicated  (  )
The patient complains of abdominal pain.  Feels better since the NGT was inserted.    Vital Signs Last 24 Hrs  T(C): 36.5 (18 Nov 2017 06:47), Max: 37.1 (17 Nov 2017 16:25)  T(F): 97.7 (18 Nov 2017 06:47), Max: 98.7 (17 Nov 2017 16:25)  HR: 96 (18 Nov 2017 06:47) (90 - 97)  BP: 166/93 (18 Nov 2017 06:47) (147/77 - 167/95)  BP(mean): --  RR: 16 (18 Nov 2017 06:47) (16 - 20)  SpO2: 95% (18 Nov 2017 06:47) (93% - 98%)    PHYSICAL EXAM:  General: NAD.  HEENT: no JVD, no jaundice.  LUNGS: CTAB.  Heart: S1 S2 RRR  Abd: soft nt/nd                             9.3    4.0   )-----------( 210      ( 18 Nov 2017 06:19 )             29.6       11-18    139  |  97  |  23  ----------------------------<  105<H>  3.3<L>   |  35<H>  |  1.21    Ca    9.3      18 Nov 2017 06:19    TPro  7.5  /  Alb  3.0<L>  /  TBili  0.3  /  DBili  x   /  AST  22  /  ALT  14  /  AlkPhos  76  11-17      PT/INR - ( 17 Nov 2017 17:51 )   PT: 27.0 sec;   INR: 2.45 ratio         PTT - ( 18 Nov 2017 06:19 )  PTT:31.6 sec
The patient is doing better.  tolerating clears diet.  has ostomy function from her enterocutaneous fistula    Vital Signs Last 24 Hrs  T(C): 36.5 (20 Nov 2017 10:35), Max: 36.9 (20 Nov 2017 04:24)  T(F): 97.7 (20 Nov 2017 10:35), Max: 98.4 (20 Nov 2017 04:24)  HR: 61 (20 Nov 2017 10:35) (61 - 88)  BP: 146/68 (20 Nov 2017 10:35) (129/66 - 176/69)  BP(mean): --  RR: 18 (20 Nov 2017 10:35) (18 - 18)  SpO2: 98% (20 Nov 2017 10:35) (96% - 100%)    PHYSICAL EXAM:  General: NAD.  HEENT: no JVD, no jaundice.  LUNGS: CTAB.  Heart: S1 S2 RRR  Abd: soft nt/nd   Wounds: clean dry and intact                          10.0   2.8   )-----------( 236      ( 20 Nov 2017 09:30 )             30.9       11-20    141  |  104  |  14  ----------------------------<  108<H>  3.3<L>   |  29  |  0.92    Ca    8.0<L>      20 Nov 2017 09:30
The patient is doing well without complaints. tolerating diet    Vital Signs Last 24 Hrs  T(C): 36.4 (21 Nov 2017 05:03), Max: 36.8 (21 Nov 2017 00:10)  T(F): 97.5 (21 Nov 2017 05:03), Max: 98.2 (21 Nov 2017 00:10)  HR: 76 (21 Nov 2017 05:03) (76 - 86)  BP: 132/69 (21 Nov 2017 06:39) (92/51 - 151/86)  BP(mean): --  RR: 18 (20 Nov 2017 17:40) (18 - 18)  SpO2: 96% (21 Nov 2017 05:03) (96% - 99%)    PHYSICAL EXAM:  General: NAD.  HEENT: no JVD, no jaundice.  LUNGS: CTAB.  Heart: S1 S2 RRR  Abd: soft nt/nd   Wounds: clean dry and intact                          10.0   2.8   )-----------( 236      ( 20 Nov 2017 09:30 )             30.9       11-20    141  |  104  |  14  ----------------------------<  108<H>  3.3<L>   |  29  |  0.92    Ca    8.0<L>      20 Nov 2017 09:30

## 2017-11-21 NOTE — PROGRESS NOTE ADULT - PROVIDER SPECIALTY LIST ADULT
Anticoag Management
Cardiology
Surgery

## 2017-11-22 ENCOUNTER — MESSAGE (OUTPATIENT)
Age: 67
End: 2017-11-22

## 2017-11-23 ENCOUNTER — INPATIENT (INPATIENT)
Facility: HOSPITAL | Age: 67
LOS: 4 days | Discharge: HOME | End: 2017-11-28
Attending: SURGERY | Admitting: SURGERY
Payer: MEDICARE

## 2017-11-23 VITALS
RESPIRATION RATE: 18 BRPM | OXYGEN SATURATION: 98 % | TEMPERATURE: 98 F | DIASTOLIC BLOOD PRESSURE: 91 MMHG | HEART RATE: 55 BPM | SYSTOLIC BLOOD PRESSURE: 165 MMHG

## 2017-11-23 DIAGNOSIS — K56.609 UNSPECIFIED INTESTINAL OBSTRUCTION, UNSPECIFIED AS TO PARTIAL VERSUS COMPLETE OBSTRUCTION: ICD-10-CM

## 2017-11-23 DIAGNOSIS — Z93.3 COLOSTOMY STATUS: Chronic | ICD-10-CM

## 2017-11-23 DIAGNOSIS — Z90.710 ACQUIRED ABSENCE OF BOTH CERVIX AND UTERUS: Chronic | ICD-10-CM

## 2017-11-23 DIAGNOSIS — Z90.49 ACQUIRED ABSENCE OF OTHER SPECIFIED PARTS OF DIGESTIVE TRACT: Chronic | ICD-10-CM

## 2017-11-23 DIAGNOSIS — Z98.890 OTHER SPECIFIED POSTPROCEDURAL STATES: Chronic | ICD-10-CM

## 2017-11-23 DIAGNOSIS — N85.9 NONINFLAMMATORY DISORDER OF UTERUS, UNSPECIFIED: Chronic | ICD-10-CM

## 2017-11-23 LAB
ALBUMIN SERPL ELPH-MCNC: 3.2 G/DL — LOW (ref 3.3–5)
ALP SERPL-CCNC: 72 U/L — SIGNIFICANT CHANGE UP (ref 40–120)
ALT FLD-CCNC: 12 U/L — SIGNIFICANT CHANGE UP (ref 4–33)
ANISOCYTOSIS BLD QL: SLIGHT — SIGNIFICANT CHANGE UP
APPEARANCE UR: SIGNIFICANT CHANGE UP
APTT BLD: 28.4 SEC — SIGNIFICANT CHANGE UP (ref 27.5–37.4)
AST SERPL-CCNC: 21 U/L — SIGNIFICANT CHANGE UP (ref 4–32)
BACTERIA # UR AUTO: HIGH
BASE EXCESS BLDV CALC-SCNC: 6.7 MMOL/L — SIGNIFICANT CHANGE UP
BASOPHILS # BLD AUTO: 0.02 K/UL — SIGNIFICANT CHANGE UP (ref 0–0.2)
BASOPHILS NFR BLD AUTO: 0.4 % — SIGNIFICANT CHANGE UP (ref 0–2)
BASOPHILS NFR SPEC: 0 % — SIGNIFICANT CHANGE UP (ref 0–2)
BILIRUB SERPL-MCNC: 0.2 MG/DL — SIGNIFICANT CHANGE UP (ref 0.2–1.2)
BILIRUB UR-MCNC: SIGNIFICANT CHANGE UP
BLD GP AB SCN SERPL QL: NEGATIVE — SIGNIFICANT CHANGE UP
BLOOD GAS VENOUS - CREATININE: 0.83 MG/DL — SIGNIFICANT CHANGE UP (ref 0.5–1.3)
BLOOD UR QL VISUAL: HIGH
BUN SERPL-MCNC: 20 MG/DL — SIGNIFICANT CHANGE UP (ref 7–23)
CALCIUM SERPL-MCNC: 8.4 MG/DL — SIGNIFICANT CHANGE UP (ref 8.4–10.5)
CHLORIDE BLDV-SCNC: 99 MMOL/L — SIGNIFICANT CHANGE UP (ref 96–108)
CHLORIDE SERPL-SCNC: 95 MMOL/L — LOW (ref 98–107)
CO2 SERPL-SCNC: 27 MMOL/L — SIGNIFICANT CHANGE UP (ref 22–31)
COLOR SPEC: HIGH
CREAT SERPL-MCNC: 0.85 MG/DL — SIGNIFICANT CHANGE UP (ref 0.5–1.3)
EOSINOPHIL # BLD AUTO: 0.03 K/UL — SIGNIFICANT CHANGE UP (ref 0–0.5)
EOSINOPHIL NFR BLD AUTO: 0.6 % — SIGNIFICANT CHANGE UP (ref 0–6)
EOSINOPHIL NFR FLD: 1.8 % — SIGNIFICANT CHANGE UP (ref 0–6)
GAS PNL BLDV: 130 MMOL/L — LOW (ref 136–146)
GIANT PLATELETS BLD QL SMEAR: PRESENT — SIGNIFICANT CHANGE UP
GLUCOSE BLDV-MCNC: 100 — HIGH (ref 70–99)
GLUCOSE SERPL-MCNC: 101 MG/DL — HIGH (ref 70–99)
GLUCOSE UR-MCNC: NEGATIVE — SIGNIFICANT CHANGE UP
HCO3 BLDV-SCNC: 30 MMOL/L — HIGH (ref 20–27)
HCT VFR BLD CALC: 29.2 % — LOW (ref 34.5–45)
HCT VFR BLDV CALC: 30.9 % — LOW (ref 34.5–45)
HGB BLD-MCNC: 9.5 G/DL — LOW (ref 11.5–15.5)
HGB BLDV-MCNC: 10 G/DL — LOW (ref 11.5–15.5)
IMM GRANULOCYTES # BLD AUTO: 0.15 # — SIGNIFICANT CHANGE UP
IMM GRANULOCYTES NFR BLD AUTO: 3.2 % — HIGH (ref 0–1.5)
INR BLD: 1.04 — SIGNIFICANT CHANGE UP (ref 0.88–1.17)
KETONES UR-MCNC: SIGNIFICANT CHANGE UP
LACTATE BLDV-MCNC: 1.1 MMOL/L — SIGNIFICANT CHANGE UP (ref 0.5–2)
LEUKOCYTE ESTERASE UR-ACNC: HIGH
LIDOCAIN IGE QN: 44 U/L — SIGNIFICANT CHANGE UP (ref 7–60)
LYMPHOCYTES # BLD AUTO: 1.03 K/UL — SIGNIFICANT CHANGE UP (ref 1–3.3)
LYMPHOCYTES # BLD AUTO: 22.2 % — SIGNIFICANT CHANGE UP (ref 13–44)
LYMPHOCYTES NFR SPEC AUTO: 21.2 % — SIGNIFICANT CHANGE UP (ref 13–44)
MACROCYTES BLD QL: SLIGHT — SIGNIFICANT CHANGE UP
MCHC RBC-ENTMCNC: 32 PG — SIGNIFICANT CHANGE UP (ref 27–34)
MCHC RBC-ENTMCNC: 32.5 % — SIGNIFICANT CHANGE UP (ref 32–36)
MCV RBC AUTO: 98.3 FL — SIGNIFICANT CHANGE UP (ref 80–100)
MONOCYTES # BLD AUTO: 0.92 K/UL — HIGH (ref 0–0.9)
MONOCYTES NFR BLD AUTO: 19.9 % — HIGH (ref 2–14)
MONOCYTES NFR BLD: 10.6 % — HIGH (ref 2–9)
NEUTROPHIL AB SER-ACNC: 62.8 % — SIGNIFICANT CHANGE UP (ref 43–77)
NEUTROPHILS # BLD AUTO: 2.48 K/UL — SIGNIFICANT CHANGE UP (ref 1.8–7.4)
NEUTROPHILS NFR BLD AUTO: 53.7 % — SIGNIFICANT CHANGE UP (ref 43–77)
NITRITE UR-MCNC: POSITIVE — HIGH
NRBC # FLD: 0 — SIGNIFICANT CHANGE UP
PCO2 BLDV: 45 MMHG — SIGNIFICANT CHANGE UP (ref 41–51)
PH BLDV: 7.45 PH — HIGH (ref 7.32–7.43)
PH UR: 6 — SIGNIFICANT CHANGE UP (ref 4.6–8)
PLATELET # BLD AUTO: 307 K/UL — SIGNIFICANT CHANGE UP (ref 150–400)
PLATELET COUNT - ESTIMATE: NORMAL — SIGNIFICANT CHANGE UP
PMV BLD: 9.2 FL — SIGNIFICANT CHANGE UP (ref 7–13)
PO2 BLDV: 34 MMHG — LOW (ref 35–40)
POLYCHROMASIA BLD QL SMEAR: SLIGHT — SIGNIFICANT CHANGE UP
POTASSIUM BLDV-SCNC: 2.8 MMOL/L — CRITICAL LOW (ref 3.4–4.5)
POTASSIUM SERPL-MCNC: 3.1 MMOL/L — LOW (ref 3.5–5.3)
POTASSIUM SERPL-SCNC: 3.1 MMOL/L — LOW (ref 3.5–5.3)
PROT SERPL-MCNC: 6.4 G/DL — SIGNIFICANT CHANGE UP (ref 6–8.3)
PROT UR-MCNC: 500 — HIGH
PROTHROM AB SERPL-ACNC: 11.7 SEC — SIGNIFICANT CHANGE UP (ref 9.8–13.1)
RBC # BLD: 2.97 M/UL — LOW (ref 3.8–5.2)
RBC # FLD: 15.2 % — HIGH (ref 10.3–14.5)
RBC CASTS # UR COMP ASSIST: >50 — HIGH (ref 0–?)
RH IG SCN BLD-IMP: POSITIVE — SIGNIFICANT CHANGE UP
SAO2 % BLDV: 59.8 % — LOW (ref 60–85)
SODIUM SERPL-SCNC: 138 MMOL/L — SIGNIFICANT CHANGE UP (ref 135–145)
SP GR SPEC: 1.02 — SIGNIFICANT CHANGE UP (ref 1–1.03)
TARGETS BLD QL SMEAR: SIGNIFICANT CHANGE UP
UROBILINOGEN FLD QL: 4 E.U. — HIGH (ref 0.1–0.2)
VARIANT LYMPHS # BLD: 3.6 % — SIGNIFICANT CHANGE UP
WBC # BLD: 4.63 K/UL — SIGNIFICANT CHANGE UP (ref 3.8–10.5)
WBC # FLD AUTO: 4.63 K/UL — SIGNIFICANT CHANGE UP (ref 3.8–10.5)
WBC CLUMPS #/AREA URNS HPF: PRESENT — HIGH (ref 0–?)
WBC UR QL: >50 — HIGH (ref 0–?)

## 2017-11-23 PROCEDURE — 99222 1ST HOSP IP/OBS MODERATE 55: CPT | Mod: GC

## 2017-11-23 PROCEDURE — 74177 CT ABD & PELVIS W/CONTRAST: CPT | Mod: 26

## 2017-11-23 RX ORDER — METOPROLOL TARTRATE 50 MG
5 TABLET ORAL EVERY 6 HOURS
Qty: 0 | Refills: 0 | Status: DISCONTINUED | OUTPATIENT
Start: 2017-11-23 | End: 2017-11-27

## 2017-11-23 RX ORDER — SODIUM CHLORIDE 9 MG/ML
1000 INJECTION INTRAMUSCULAR; INTRAVENOUS; SUBCUTANEOUS
Qty: 0 | Refills: 0 | Status: DISCONTINUED | OUTPATIENT
Start: 2017-11-23 | End: 2017-11-23

## 2017-11-23 RX ORDER — ENOXAPARIN SODIUM 100 MG/ML
47 INJECTION SUBCUTANEOUS
Qty: 0 | Refills: 0 | Status: DISCONTINUED | OUTPATIENT
Start: 2017-11-23 | End: 2017-11-23

## 2017-11-23 RX ORDER — MORPHINE SULFATE 50 MG/1
2 CAPSULE, EXTENDED RELEASE ORAL ONCE
Qty: 0 | Refills: 0 | Status: DISCONTINUED | OUTPATIENT
Start: 2017-11-23 | End: 2017-11-23

## 2017-11-23 RX ORDER — FAMOTIDINE 10 MG/ML
20 INJECTION INTRAVENOUS ONCE
Qty: 0 | Refills: 0 | Status: COMPLETED | OUTPATIENT
Start: 2017-11-23 | End: 2017-11-23

## 2017-11-23 RX ORDER — VANCOMYCIN HCL 1 G
1000 VIAL (EA) INTRAVENOUS EVERY 12 HOURS
Qty: 0 | Refills: 0 | Status: DISCONTINUED | OUTPATIENT
Start: 2017-11-24 | End: 2017-11-24

## 2017-11-23 RX ORDER — PANTOPRAZOLE SODIUM 20 MG/1
40 TABLET, DELAYED RELEASE ORAL DAILY
Qty: 0 | Refills: 0 | Status: DISCONTINUED | OUTPATIENT
Start: 2017-11-23 | End: 2017-11-25

## 2017-11-23 RX ORDER — ENOXAPARIN SODIUM 100 MG/ML
50 INJECTION SUBCUTANEOUS
Qty: 0 | Refills: 0 | Status: DISCONTINUED | OUTPATIENT
Start: 2017-11-23 | End: 2017-11-28

## 2017-11-23 RX ORDER — VANCOMYCIN HCL 1 G
1000 VIAL (EA) INTRAVENOUS ONCE
Qty: 0 | Refills: 0 | Status: COMPLETED | OUTPATIENT
Start: 2017-11-23 | End: 2017-11-23

## 2017-11-23 RX ORDER — SODIUM CHLORIDE 9 MG/ML
1000 INJECTION, SOLUTION INTRAVENOUS
Qty: 0 | Refills: 0 | Status: DISCONTINUED | OUTPATIENT
Start: 2017-11-23 | End: 2017-11-24

## 2017-11-23 RX ORDER — ONDANSETRON 8 MG/1
4 TABLET, FILM COATED ORAL ONCE
Qty: 0 | Refills: 0 | Status: COMPLETED | OUTPATIENT
Start: 2017-11-23 | End: 2017-11-23

## 2017-11-23 RX ORDER — POTASSIUM CHLORIDE 20 MEQ
10 PACKET (EA) ORAL
Qty: 0 | Refills: 0 | Status: COMPLETED | OUTPATIENT
Start: 2017-11-23 | End: 2017-11-23

## 2017-11-23 RX ORDER — MORPHINE SULFATE 50 MG/1
4 CAPSULE, EXTENDED RELEASE ORAL ONCE
Qty: 0 | Refills: 0 | Status: DISCONTINUED | OUTPATIENT
Start: 2017-11-23 | End: 2017-11-23

## 2017-11-23 RX ORDER — METOCLOPRAMIDE HCL 10 MG
10 TABLET ORAL ONCE
Qty: 0 | Refills: 0 | Status: COMPLETED | OUTPATIENT
Start: 2017-11-23 | End: 2017-11-23

## 2017-11-23 RX ORDER — SODIUM CHLORIDE 9 MG/ML
1000 INJECTION INTRAMUSCULAR; INTRAVENOUS; SUBCUTANEOUS ONCE
Qty: 0 | Refills: 0 | Status: COMPLETED | OUTPATIENT
Start: 2017-11-23 | End: 2017-11-23

## 2017-11-23 RX ADMIN — MORPHINE SULFATE 2 MILLIGRAM(S): 50 CAPSULE, EXTENDED RELEASE ORAL at 22:00

## 2017-11-23 RX ADMIN — Medication 100 MILLIEQUIVALENT(S): at 18:34

## 2017-11-23 RX ADMIN — SODIUM CHLORIDE 150 MILLILITER(S): 9 INJECTION INTRAMUSCULAR; INTRAVENOUS; SUBCUTANEOUS at 18:34

## 2017-11-23 RX ADMIN — SODIUM CHLORIDE 1000 MILLILITER(S): 9 INJECTION INTRAMUSCULAR; INTRAVENOUS; SUBCUTANEOUS at 14:35

## 2017-11-23 RX ADMIN — Medication 5 MILLIGRAM(S): at 23:01

## 2017-11-23 RX ADMIN — MORPHINE SULFATE 2 MILLIGRAM(S): 50 CAPSULE, EXTENDED RELEASE ORAL at 21:23

## 2017-11-23 RX ADMIN — ONDANSETRON 4 MILLIGRAM(S): 8 TABLET, FILM COATED ORAL at 14:34

## 2017-11-23 RX ADMIN — Medication 100 MILLIEQUIVALENT(S): at 19:59

## 2017-11-23 RX ADMIN — MORPHINE SULFATE 2 MILLIGRAM(S): 50 CAPSULE, EXTENDED RELEASE ORAL at 16:19

## 2017-11-23 RX ADMIN — Medication 30 MILLILITER(S): at 15:37

## 2017-11-23 RX ADMIN — Medication 100 MILLIEQUIVALENT(S): at 15:54

## 2017-11-23 RX ADMIN — Medication 10 MILLIGRAM(S): at 15:37

## 2017-11-23 RX ADMIN — MORPHINE SULFATE 4 MILLIGRAM(S): 50 CAPSULE, EXTENDED RELEASE ORAL at 14:34

## 2017-11-23 RX ADMIN — MORPHINE SULFATE 2 MILLIGRAM(S): 50 CAPSULE, EXTENDED RELEASE ORAL at 16:04

## 2017-11-23 RX ADMIN — Medication 250 MILLIGRAM(S): at 17:36

## 2017-11-23 RX ADMIN — MORPHINE SULFATE 4 MILLIGRAM(S): 50 CAPSULE, EXTENDED RELEASE ORAL at 15:05

## 2017-11-23 RX ADMIN — FAMOTIDINE 20 MILLIGRAM(S): 10 INJECTION INTRAVENOUS at 14:34

## 2017-11-23 RX ADMIN — SODIUM CHLORIDE 100 MILLILITER(S): 9 INJECTION, SOLUTION INTRAVENOUS at 19:59

## 2017-11-23 NOTE — H&P ADULT - HISTORY OF PRESENT ILLNESS
General Surgery Consultation Note  Pager#    HPI:      Medical History:  Surgical History:  Allergies: No known drug allergies  Social History: nonsmoker, no etoh or drug abuse; lives with  Family History: noncontributory    Review of Systems:  General: No weight changes, no fevers or chills, no weakness  Neurologic: No numbness or weakness, no facial drooping  Cardiovascular: No chest pain or shortness of breath, no extremity edema  Pulmonary: No cough or hemoptysis, no wheezing  Abdominal: No abdominal pain, no hematochezia, no changes in bowel habits  Genitourinary: No hematuria, no dysuria  Skin: no rashes or bruising            Physical Exam  General: alert, no distress  Psychiatric: affect appropriate  Neurologic: No focal neurologic deficits  Cardiovascular: Regular rate and rhythm  Pulmonary: Breathing unlabored  Abdominal: Soft, nondistended, nontender  Extremities: Moves all extremities, warm  Skin: Warm and dry, no wounds General Surgery Consultation Note  Pager# [phone]    HPI: 67 year old woman with ovarian cancer on chemotherapy ([place] [facility] [place] who presents to [facility] ED with abdominal pain, diffuse and crampy in nature, 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  She usually fills her ostomy bag with gas and stool but has only been able to have small amount of stool in the bag after a dose of miralax today.  She had one episode of emesis this morning, some nausea initially and now resolved.   She is refusing NG tube placement at this time.    She had a recent SBO and was treated nonoperatively with nasogastric decompression at [facility].      Medical History: ovarian cancer on chemotherapy (carboplatin, gemzor)  Surgical History: s/p hysterectomy (fibroids) 2003, laparoscopy cholecystectomy (2011), ovarian cancer debulking operation (4/2016) complicated by colonic perforation, intraabdominal abscess s/p RTOR for ex lap, abscess drainage, hartmanns procedure, and return to OR 4/2017 for attempted hartmanns reversal, aborted for finding of cancer recurrence  Allergies: No known drug allergies  Social History: Former 16 pack year smoker, quit 1993; daily etoh use (1-2 drinks) x 20 years, last drink one week ago; no other drug use; lives with   Family History: paternal prostate cancer, aunt with breast cancer

## 2017-11-23 NOTE — H&P ADULT - NSHPREVIEWOFSYSTEMS_GEN_ALL_CORE
REVIEW OF SYSTEMS:    CONSTITUTIONAL: + weakness, no fevers or chills  EYES/ENT: No visual changes;  No vertigo or throat pain   NECK: No pain or stiffness  RESPIRATORY: No cough, wheezing, hemoptysis; No shortness of breath  CARDIOVASCULAR: No chest pain or palpitations  GASTROINTESTINAL: See HPI  GENITOURINARY: + dysuria, + frequency   NEUROLOGICAL: No numbness or weakness  SKIN: No itching, burning, rashes, or lesions   All other review of systems is negative unless indicated above. Review of Systems:  General: No weight changes, no fevers or chills, + weakness  Psychiatric: No depression  Neurologic: No numbness or weakness, no facial drooping  Cardiovascular: No chest pain or shortness of breath, no extremity edema  Pulmonary: No cough or hemoptysis, no wheezing  Abdominal: See HPI  Genitourinary: No hematuria, + dysuria, + urinary frequency  Skin: no rashes or bruising

## 2017-11-23 NOTE — H&P ADULT - ASSESSMENT
67 year old woman with ovarian cancer on chemotherapy s/p multiple abdominal surgeries including tumor debulking c/b colon perforation, sigmoid resection and hartmanns, s/p attempted reversal presents today with recurrent SBO.

## 2017-11-23 NOTE — H&P ADULT - NSHPLABSRESULTS_GEN_ALL_CORE
CT abdomen/pelvis showing small bowel obstruction with no signs of bowel ischemia, transition point in the pelvis

## 2017-11-23 NOTE — H&P ADULT - NSHPPHYSICALEXAM_GEN_ALL_CORE
General: No acute distress, appears nontoxic  Neurologic: No focal deficits  Psychiatric: Appropriate affect  Cardiovascular: Regular rate and rhythm  Pulmonary: Unlabored breathing  Abdominal:   Extremities: No edema, moves all without limitations  Skin: Warm, no rashes General: No acute distress, appears nontoxic  Neurologic: No focal deficits  Psychiatric: Appropriate affect  Cardiovascular: Regular rate and rhythm  Pulmonary: Unlabored breathing  Abdominal: softly distended, mild RLQ tenderness; midline fistula with pink bowel mucosa protruding, small amount of gas and stool in ostomy bag  Extremities: No edema, moves all without limitations  Skin: Warm, no rashes

## 2017-11-23 NOTE — ED ADULT NURSE NOTE - CHIEF COMPLAINT QUOTE
Pt c/o abd pain since last night. Was at [facility] last week for SBO and had NGT placed. Denies N/V. Pt abd distended. Hx: HTN, UTI on doxycycline.

## 2017-11-23 NOTE — H&P ADULT - NSHPSOCIALHISTORY_GEN_ALL_CORE
Former 16 pack year smoker, quit 1993; daily etoh use (1-2 drinks) x 20 years, last drink one week ago; no other drug use; lives with

## 2017-11-23 NOTE — H&P ADULT - PROBLEM SELECTOR PLAN 1
- NPO/IVF  - Replete electrolytes PRN  - Strict I/O monitoring  - Serial abdominal exams  - Patient refusing NGT at this time; will reevaluate in the case of nausea/emesis or worsening exam  - Plan discussed with Dr. Zhou - NPO/IVF  - Replete electrolytes PRN  - Strict I/O monitoring  - IV antibiotics to treat UTI  - Serial abdominal exams  - Patient refusing NGT at this time; will reevaluate in the case of nausea/emesis or worsening exam  - Plan discussed with Dr. Zhou

## 2017-11-23 NOTE — H&P ADULT - NSHPOUTPATIENTPROVIDERS_GEN_ALL_CORE
Dr. Ramirez (urology)  Dr. Ribeiro (gyn onc)  [place] [facility] [place]

## 2017-11-23 NOTE — ED PROVIDER NOTE - OBJECTIVE STATEMENT
68 y/o female pmh ovarian CA on chemo, pulmonary embolism on xarellola frye in 2016, reversal attempted in 2017, admitted to [facility] 6 days ago with high grade sbo, released yesterday presents with c/o 66 y/o female pmh ovarian CA on chemo, pulmonary embolism on xarelto hartmans in 2016, reversal attempted in 2017, admitted to [facility] 6 days ago with high grade sbo, released yesterday presents with c/o diffuse 10/10 abdominal pain, nausea, not having bm x 1 day today, took mirilax and has been having liquid in her ostomy bag, states that this is how she felt with her last sbo, denies any headaches, enck pain, cough, chest pain, sob, urinary symptoms, numbness/weakness/tingling, recent travel, social history. surgeon- dr. held

## 2017-11-24 DIAGNOSIS — N39.0 URINARY TRACT INFECTION, SITE NOT SPECIFIED: ICD-10-CM

## 2017-11-24 DIAGNOSIS — Z93.3 COLOSTOMY STATUS: ICD-10-CM

## 2017-11-24 DIAGNOSIS — Z86.711 PERSONAL HISTORY OF PULMONARY EMBOLISM: ICD-10-CM

## 2017-11-24 DIAGNOSIS — K56.609 UNSPECIFIED INTESTINAL OBSTRUCTION, UNSPECIFIED AS TO PARTIAL VERSUS COMPLETE OBSTRUCTION: ICD-10-CM

## 2017-11-24 DIAGNOSIS — Z92.21 PERSONAL HISTORY OF ANTINEOPLASTIC CHEMOTHERAPY: ICD-10-CM

## 2017-11-24 DIAGNOSIS — R10.9 UNSPECIFIED ABDOMINAL PAIN: ICD-10-CM

## 2017-11-24 DIAGNOSIS — E78.5 HYPERLIPIDEMIA, UNSPECIFIED: ICD-10-CM

## 2017-11-24 DIAGNOSIS — C56.9 MALIGNANT NEOPLASM OF UNSPECIFIED OVARY: ICD-10-CM

## 2017-11-24 DIAGNOSIS — I10 ESSENTIAL (PRIMARY) HYPERTENSION: ICD-10-CM

## 2017-11-24 DIAGNOSIS — K59.00 CONSTIPATION, UNSPECIFIED: ICD-10-CM

## 2017-11-24 DIAGNOSIS — B95.62 METHICILLIN RESISTANT STAPHYLOCOCCUS AUREUS INFECTION AS THE CAUSE OF DISEASES CLASSIFIED ELSEWHERE: ICD-10-CM

## 2017-11-24 LAB
BUN SERPL-MCNC: 13 MG/DL — SIGNIFICANT CHANGE UP (ref 7–23)
CALCIUM SERPL-MCNC: 7.6 MG/DL — LOW (ref 8.4–10.5)
CHLORIDE SERPL-SCNC: 101 MMOL/L — SIGNIFICANT CHANGE UP (ref 98–107)
CO2 SERPL-SCNC: 27 MMOL/L — SIGNIFICANT CHANGE UP (ref 22–31)
CREAT SERPL-MCNC: 0.77 MG/DL — SIGNIFICANT CHANGE UP (ref 0.5–1.3)
GLUCOSE SERPL-MCNC: 83 MG/DL — SIGNIFICANT CHANGE UP (ref 70–99)
HCT VFR BLD CALC: 23.6 % — LOW (ref 34.5–45)
HGB BLD-MCNC: 7.8 G/DL — LOW (ref 11.5–15.5)
MAGNESIUM SERPL-MCNC: 0.7 MG/DL — CRITICAL LOW (ref 1.6–2.6)
MAGNESIUM SERPL-MCNC: 1.9 MG/DL — SIGNIFICANT CHANGE UP (ref 1.6–2.6)
MCHC RBC-ENTMCNC: 33.1 % — SIGNIFICANT CHANGE UP (ref 32–36)
MCHC RBC-ENTMCNC: 33.5 PG — SIGNIFICANT CHANGE UP (ref 27–34)
MCV RBC AUTO: 101.3 FL — HIGH (ref 80–100)
NRBC # FLD: 0 — SIGNIFICANT CHANGE UP
PHOSPHATE SERPL-MCNC: 2.3 MG/DL — LOW (ref 2.5–4.5)
PLATELET # BLD AUTO: 238 K/UL — SIGNIFICANT CHANGE UP (ref 150–400)
PMV BLD: 9 FL — SIGNIFICANT CHANGE UP (ref 7–13)
POTASSIUM SERPL-MCNC: 3.7 MMOL/L — SIGNIFICANT CHANGE UP (ref 3.5–5.3)
POTASSIUM SERPL-SCNC: 3.7 MMOL/L — SIGNIFICANT CHANGE UP (ref 3.5–5.3)
RBC # BLD: 2.33 M/UL — LOW (ref 3.8–5.2)
RBC # FLD: 15.4 % — HIGH (ref 10.3–14.5)
SODIUM SERPL-SCNC: 139 MMOL/L — SIGNIFICANT CHANGE UP (ref 135–145)
WBC # BLD: 3.65 K/UL — LOW (ref 3.8–10.5)
WBC # FLD AUTO: 3.65 K/UL — LOW (ref 3.8–10.5)

## 2017-11-24 PROCEDURE — 71010: CPT | Mod: 26

## 2017-11-24 PROCEDURE — 99232 SBSQ HOSP IP/OBS MODERATE 35: CPT

## 2017-11-24 RX ORDER — ACETAMINOPHEN 500 MG
1000 TABLET ORAL ONCE
Qty: 0 | Refills: 0 | Status: DISCONTINUED | OUTPATIENT
Start: 2017-11-24 | End: 2017-11-24

## 2017-11-24 RX ORDER — VANCOMYCIN HCL 1 G
VIAL (EA) INTRAVENOUS
Qty: 0 | Refills: 0 | Status: DISCONTINUED | OUTPATIENT
Start: 2017-11-24 | End: 2017-11-25

## 2017-11-24 RX ORDER — POTASSIUM PHOSPHATE, MONOBASIC POTASSIUM PHOSPHATE, DIBASIC 236; 224 MG/ML; MG/ML
15 INJECTION, SOLUTION INTRAVENOUS ONCE
Qty: 0 | Refills: 0 | Status: COMPLETED | OUTPATIENT
Start: 2017-11-24 | End: 2017-11-24

## 2017-11-24 RX ORDER — VANCOMYCIN HCL 1 G
VIAL (EA) INTRAVENOUS
Qty: 0 | Refills: 0 | Status: DISCONTINUED | OUTPATIENT
Start: 2017-11-24 | End: 2017-11-24

## 2017-11-24 RX ORDER — MAGNESIUM SULFATE 500 MG/ML
2 VIAL (ML) INJECTION
Qty: 0 | Refills: 0 | Status: COMPLETED | OUTPATIENT
Start: 2017-11-24 | End: 2017-11-24

## 2017-11-24 RX ORDER — HYDROMORPHONE HYDROCHLORIDE 2 MG/ML
0.5 INJECTION INTRAMUSCULAR; INTRAVENOUS; SUBCUTANEOUS ONCE
Qty: 0 | Refills: 0 | Status: DISCONTINUED | OUTPATIENT
Start: 2017-11-24 | End: 2017-11-24

## 2017-11-24 RX ORDER — MORPHINE SULFATE 50 MG/1
2 CAPSULE, EXTENDED RELEASE ORAL ONCE
Qty: 0 | Refills: 0 | Status: DISCONTINUED | OUTPATIENT
Start: 2017-11-24 | End: 2017-11-24

## 2017-11-24 RX ORDER — DEXTROSE MONOHYDRATE, SODIUM CHLORIDE, AND POTASSIUM CHLORIDE 50; .745; 4.5 G/1000ML; G/1000ML; G/1000ML
1000 INJECTION, SOLUTION INTRAVENOUS
Qty: 0 | Refills: 0 | Status: DISCONTINUED | OUTPATIENT
Start: 2017-11-24 | End: 2017-11-25

## 2017-11-24 RX ORDER — ACETAMINOPHEN 500 MG
500 TABLET ORAL ONCE
Qty: 0 | Refills: 0 | Status: COMPLETED | OUTPATIENT
Start: 2017-11-24 | End: 2017-11-24

## 2017-11-24 RX ORDER — MORPHINE SULFATE 50 MG/1
1 CAPSULE, EXTENDED RELEASE ORAL ONCE
Qty: 0 | Refills: 0 | Status: DISCONTINUED | OUTPATIENT
Start: 2017-11-24 | End: 2017-11-24

## 2017-11-24 RX ORDER — NITROFURANTOIN MACROCRYSTAL 50 MG
100 CAPSULE ORAL
Qty: 0 | Refills: 0 | Status: DISCONTINUED | OUTPATIENT
Start: 2017-11-24 | End: 2017-11-24

## 2017-11-24 RX ORDER — VANCOMYCIN HCL 1 G
750 VIAL (EA) INTRAVENOUS ONCE
Qty: 0 | Refills: 0 | Status: COMPLETED | OUTPATIENT
Start: 2017-11-24 | End: 2017-11-24

## 2017-11-24 RX ORDER — SODIUM CHLORIDE 9 MG/ML
1000 INJECTION, SOLUTION INTRAVENOUS
Qty: 0 | Refills: 0 | Status: DISCONTINUED | OUTPATIENT
Start: 2017-11-24 | End: 2017-11-24

## 2017-11-24 RX ORDER — ACETAMINOPHEN 500 MG
750 TABLET ORAL ONCE
Qty: 0 | Refills: 0 | Status: COMPLETED | OUTPATIENT
Start: 2017-11-24 | End: 2017-11-24

## 2017-11-24 RX ORDER — LANOLIN ALCOHOL/MO/W.PET/CERES
6 CREAM (GRAM) TOPICAL AT BEDTIME
Qty: 0 | Refills: 0 | Status: DISCONTINUED | OUTPATIENT
Start: 2017-11-24 | End: 2017-11-24

## 2017-11-24 RX ORDER — VANCOMYCIN HCL 1 G
750 VIAL (EA) INTRAVENOUS EVERY 12 HOURS
Qty: 0 | Refills: 0 | Status: DISCONTINUED | OUTPATIENT
Start: 2017-11-25 | End: 2017-11-25

## 2017-11-24 RX ADMIN — SODIUM CHLORIDE 50 MILLILITER(S): 9 INJECTION, SOLUTION INTRAVENOUS at 15:48

## 2017-11-24 RX ADMIN — Medication 500 MILLIGRAM(S): at 18:00

## 2017-11-24 RX ADMIN — Medication 5 MILLIGRAM(S): at 18:24

## 2017-11-24 RX ADMIN — Medication 150 MILLIGRAM(S): at 23:00

## 2017-11-24 RX ADMIN — Medication 110 MILLIGRAM(S): at 21:01

## 2017-11-24 RX ADMIN — MORPHINE SULFATE 1 MILLIGRAM(S): 50 CAPSULE, EXTENDED RELEASE ORAL at 18:45

## 2017-11-24 RX ADMIN — PANTOPRAZOLE SODIUM 40 MILLIGRAM(S): 20 TABLET, DELAYED RELEASE ORAL at 11:40

## 2017-11-24 RX ADMIN — MORPHINE SULFATE 2 MILLIGRAM(S): 50 CAPSULE, EXTENDED RELEASE ORAL at 22:19

## 2017-11-24 RX ADMIN — ENOXAPARIN SODIUM 50 MILLIGRAM(S): 100 INJECTION SUBCUTANEOUS at 18:24

## 2017-11-24 RX ADMIN — Medication 5 MILLIGRAM(S): at 23:15

## 2017-11-24 RX ADMIN — Medication 200 MILLIGRAM(S): at 17:31

## 2017-11-24 RX ADMIN — SODIUM CHLORIDE 100 MILLILITER(S): 9 INJECTION, SOLUTION INTRAVENOUS at 05:40

## 2017-11-24 RX ADMIN — SODIUM CHLORIDE 100 MILLILITER(S): 9 INJECTION, SOLUTION INTRAVENOUS at 07:23

## 2017-11-24 RX ADMIN — MORPHINE SULFATE 1 MILLIGRAM(S): 50 CAPSULE, EXTENDED RELEASE ORAL at 18:20

## 2017-11-24 RX ADMIN — Medication 5 MILLIGRAM(S): at 05:40

## 2017-11-24 RX ADMIN — POTASSIUM PHOSPHATE, MONOBASIC POTASSIUM PHOSPHATE, DIBASIC 62.5 MILLIMOLE(S): 236; 224 INJECTION, SOLUTION INTRAVENOUS at 15:48

## 2017-11-24 RX ADMIN — Medication 300 MILLIGRAM(S): at 01:49

## 2017-11-24 RX ADMIN — MORPHINE SULFATE 2 MILLIGRAM(S): 50 CAPSULE, EXTENDED RELEASE ORAL at 22:04

## 2017-11-24 RX ADMIN — Medication 750 MILLIGRAM(S): at 02:04

## 2017-11-24 RX ADMIN — Medication 110 MILLIGRAM(S): at 05:40

## 2017-11-24 RX ADMIN — Medication 50 GRAM(S): at 09:52

## 2017-11-24 RX ADMIN — Medication 50 GRAM(S): at 11:40

## 2017-11-24 RX ADMIN — Medication 5 MILLIGRAM(S): at 11:40

## 2017-11-24 NOTE — PROGRESS NOTE ADULT - ASSESSMENT
67 year old woman with ovarian cancer on chemotherapy s/p multiple abdominal surgeries including tumor debulking c/b colon perforation, sigmoid resection and hartmanns, s/p attempted reversal presents today with recurrent SBO.    - NPO IVF, change to maintenance   - Replete electrolytes PRN  - Strict I/O monitoring  - IV antibiotics to treat UTI, follow up curine culture  - Serial abdominal exams  - Plan discussed with NPO/IVFA team 67 year old woman with ovarian cancer on chemotherapy s/p multiple abdominal surgeries including tumor debulking c/b colon perforation, sigmoid resection and hartmanns, s/p attempted reversal presents today with recurrent SBO.    - NPO IVF, change to maintenance   - Replete electrolytes PRN  - Strict I/O monitoring  - IV antibiotics to treat UTI, follow up curine culture  - Serial abdominal exams  - Therapeutic Lovenox for PE  - Plan discussed with NPO/IVFA team 67 year old woman with ovarian cancer on chemotherapy s/p multiple abdominal surgeries including tumor debulking c/b colon perforation, sigmoid resection and hartmanns, s/p attempted reversal presents today with recurrent SBO.    - NPO IVF, change to maintenance   - Replete Magnesium and phosphorus   - Strict I/O monitoring  - IV antibiotics to treat UTI, follow up curine culture  - Serial abdominal exams  - Therapeutic Lovenox for PE  - Nutrition consult  - Plan discussed with NPO/IVFA team

## 2017-11-24 NOTE — PROGRESS NOTE ADULT - SUBJECTIVE AND OBJECTIVE BOX
Morning Surgical Progress Note  Patient is a 67y old  Female who presents with a chief complaint of no ostomy output (2017 20:41)    SUBJECTIVE: Patient seen and examined at bedside with surgical team, patient without complaints. States she feels better than yesterday, has liquid stool coming out of her ostomy, no formed stool yet, denies nausea and vomiting. Patient requested a nutrition consult because she is losing weight.    Vital Signs Last 24 Hrs  T(C): 36.6 (2017 05:39), Max: 37 (2017 20:40)  T(F): 97.9 (2017 05:39), Max: 98.6 (2017 20:40)  HR: 91 (2017 05:39) (55 - 93)  BP: 140/72 (2017 05:39) (136/76 - 175/102)  BP(mean): --  RR: 17 (2017 05:39) (17 - 18)  SpO2: 97% (2017 05:39) (97% - 99%)  I&O's Detail    2017 07:01  -  2017 07:00  --------------------------------------------------------  IN:    lactated ringers.: 800 mL  Total IN: 800 mL    OUT:    Colostomy: 100 mL    Voided: 700 mL  Total OUT: 800 mL    Total NET: 0 mL    MEDICATIONS  (STANDING):  doxycycline IVPB 100 milliGRAM(s) IV Intermittent every 12 hours  enoxaparin Injectable 50 milliGRAM(s) SubCutaneous two times a day  lactated ringers. 1000 milliLiter(s) (100 mL/Hr) IV Continuous <Continuous>  melatonin 6 milliGRAM(s) Oral at bedtime  metoprolol    tartrate Injectable 5 milliGRAM(s) IV Push every 6 hours  pantoprazole  Injectable 40 milliGRAM(s) IV Push daily    MEDICATIONS  (PRN):    Physical Exam  General: A&Ox3, NAD  Abdominal: Soft nontender mildly distended, ostomy bag with minimal gas and minimal liquid stool.    LABS:                        9.5    4.63  )-----------( 307      ( 2017 14:27 )             29.2         138  |  95<L>  |  20  ----------------------------<  101<H>  3.1<L>   |  27  |  0.85    Ca    8.4      2017 14:27    TPro  6.4  /  Alb  3.2<L>  /  TBili  0.2  /  DBili  x   /  AST  21  /  ALT  12  /  AlkPhos  72      PT/INR - ( 2017 14:27 )   PT: 11.7 SEC;   INR: 1.04          PTT - ( 2017 14:27 )  PTT:28.4 SEC  LIVER FUNCTIONS - ( 2017 14:27 )  Alb: 3.2 g/dL / Pro: 6.4 g/dL / ALK PHOS: 72 u/L / ALT: 12 u/L / AST: 21 u/L / GGT: x           Urinalysis Basic - ( 2017 13:30 )    Color: ORANGE / Appearance: TURBID / S.021 / pH: 6.0  Gluc: NEGATIVE / Ketone: SMALL  / Bili: MODERATE / Urobili: 4 E.U.   Blood: MODERATE / Protein: 500 / Nitrite: POSITIVE   Leuk Esterase: LARGE / RBC: >50 / WBC >50   Sq Epi: x / Non Sq Epi: x / Bacteria: MANY  ABO Interpretation: O (17 @ 13:32) Morning Surgical Progress Note  Patient is a 67y old  Female who presents with a chief complaint of no ostomy output (2017 20:41)    SUBJECTIVE: Patient seen and examined at bedside with surgical team, patient without complaints. States she feels better than yesterday, has liquid stool coming out of her ostomy, no formed stool yet, denies nausea and vomiting. Patient requested a nutrition consult because she is losing weight.    Vital Signs Last 24 Hrs  T(C): 36.6 (2017 05:39), Max: 37 (2017 20:40)  T(F): 97.9 (2017 05:39), Max: 98.6 (2017 20:40)  HR: 91 (2017 05:39) (55 - 93)  BP: 140/72 (2017 05:39) (136/76 - 175/102)  BP(mean): --  RR: 17 (2017 05:39) (17 - 18)  SpO2: 97% (2017 05:39) (97% - 99%)  I&O's Detail    2017 07:01  -  2017 07:00  --------------------------------------------------------  IN:    lactated ringers.: 800 mL  Total IN: 800 mL    OUT:    Colostomy: 100 mL    Voided: 700 mL  Total OUT: 800 mL    Total NET: 0 mL    MEDICATIONS  (STANDING):  doxycycline IVPB 100 milliGRAM(s) IV Intermittent every 12 hours  enoxaparin Injectable 50 milliGRAM(s) SubCutaneous two times a day  lactated ringers. 1000 milliLiter(s) (100 mL/Hr) IV Continuous <Continuous>  melatonin 6 milliGRAM(s) Oral at bedtime  metoprolol    tartrate Injectable 5 milliGRAM(s) IV Push every 6 hours  pantoprazole  Injectable 40 milliGRAM(s) IV Push daily    MEDICATIONS  (PRN):    Physical Exam  General: A&Ox3, NAD  Abdominal: Soft nontender mildly distended, ostomy bag with minimal gas and minimal liquid stool. Old ostomy site dressing c/d/i    LABS:                        9.5    4.63  )-----------( 307      ( 2017 14:27 )             29.2         138  |  95<L>  |  20  ----------------------------<  101<H>  3.1<L>   |  27  |  0.85    Ca    8.4      2017 14:27    TPro  6.4  /  Alb  3.2<L>  /  TBili  0.2  /  DBili  x   /  AST  21  /  ALT  12  /  AlkPhos  72      PT/INR - ( 2017 14:27 )   PT: 11.7 SEC;   INR: 1.04          PTT - ( 2017 14:27 )  PTT:28.4 SEC  LIVER FUNCTIONS - ( 2017 14:27 )  Alb: 3.2 g/dL / Pro: 6.4 g/dL / ALK PHOS: 72 u/L / ALT: 12 u/L / AST: 21 u/L / GGT: x           Urinalysis Basic - ( 2017 13:30 )    Color: ORANGE / Appearance: TURBID / S.021 / pH: 6.0  Gluc: NEGATIVE / Ketone: SMALL  / Bili: MODERATE / Urobili: 4 E.U.   Blood: MODERATE / Protein: 500 / Nitrite: POSITIVE   Leuk Esterase: LARGE / RBC: >50 / WBC >50   Sq Epi: x / Non Sq Epi: x / Bacteria: MANY  ABO Interpretation: O (17 @ 13:32)

## 2017-11-24 NOTE — CHART NOTE - NSCHARTNOTEFT_GEN_A_CORE
I was notified by the nurse that the patient was complaining of significant pain and was requesting IV Tylenol.  I examined the patient and found her abdomen to be very distended and tympanic.  Her ostomy was pink and viable, with no stool or gas in the bag, but she reported that her  had just changed it.  She denied any N/V.  I gave her 500 IV Tylenol and notified the chief resident of the service Mary Dahl MD of her condition and need for an NGT.  The patient reported no relief of her abdominal pain from the Tylenol and requested additional pain medicine.  Dr. Dahl examined the patient, and agreed that the degree of the patient's distention and her pain were indications for NGT placement.  After NGT was placed, 200cc of bilious fluid was evacuated from her stomach.  The patient is now NPO.     ICU Vital Signs Last 24 Hrs  T(C): 36.4 (24 Nov 2017 11:00), Max: 37 (23 Nov 2017 20:40)  T(F): 97.5 (24 Nov 2017 11:00), Max: 98.6 (23 Nov 2017 20:40)  HR: 66 (24 Nov 2017 11:55) (66 - 93)  BP: 128/76 (24 Nov 2017 11:55) (128/76 - 159/93)  RR: 16 (24 Nov 2017 11:55) (16 - 17)  SpO2: 98% (24 Nov 2017 11:55) (97% - 99%)      Ness Avila MD PGY-1

## 2017-11-24 NOTE — PROVIDER CONTACT NOTE (MEDICATION) - ASSESSMENT
Pt. is a&oX4, VSS. Pt. complaining of abdominal pain 7/10 with cramping. denies n/v. Abdomen remaisn distended. Pt. does not want to take any more morphine (see EMAR). Pt. states she is not allergic to acetaminaphen as has been previously listed under allergies. Pt. regularly takes Tylenol at home and would like to sign a waiver attesting to this.

## 2017-11-25 LAB
BACTERIA UR CULT: SIGNIFICANT CHANGE UP
BLD GP AB SCN SERPL QL: NEGATIVE — SIGNIFICANT CHANGE UP
BUN SERPL-MCNC: 7 MG/DL — SIGNIFICANT CHANGE UP (ref 7–23)
BUN SERPL-MCNC: 9 MG/DL — SIGNIFICANT CHANGE UP (ref 7–23)
CALCIUM SERPL-MCNC: 7.7 MG/DL — LOW (ref 8.4–10.5)
CALCIUM SERPL-MCNC: 7.8 MG/DL — LOW (ref 8.4–10.5)
CHLORIDE SERPL-SCNC: 100 MMOL/L — SIGNIFICANT CHANGE UP (ref 98–107)
CHLORIDE SERPL-SCNC: 97 MMOL/L — LOW (ref 98–107)
CO2 SERPL-SCNC: 23 MMOL/L — SIGNIFICANT CHANGE UP (ref 22–31)
CO2 SERPL-SCNC: 26 MMOL/L — SIGNIFICANT CHANGE UP (ref 22–31)
CREAT SERPL-MCNC: 0.6 MG/DL — SIGNIFICANT CHANGE UP (ref 0.5–1.3)
CREAT SERPL-MCNC: 0.63 MG/DL — SIGNIFICANT CHANGE UP (ref 0.5–1.3)
GLUCOSE SERPL-MCNC: 100 MG/DL — HIGH (ref 70–99)
GLUCOSE SERPL-MCNC: 86 MG/DL — SIGNIFICANT CHANGE UP (ref 70–99)
HCT VFR BLD CALC: 26.5 % — LOW (ref 34.5–45)
HCT VFR BLD CALC: 27.1 % — LOW (ref 34.5–45)
HGB BLD-MCNC: 8.8 G/DL — LOW (ref 11.5–15.5)
HGB BLD-MCNC: 9.1 G/DL — LOW (ref 11.5–15.5)
LACTATE SERPL-SCNC: 0.7 MMOL/L — SIGNIFICANT CHANGE UP (ref 0.5–2)
LACTATE SERPL-SCNC: 0.8 MMOL/L — SIGNIFICANT CHANGE UP (ref 0.5–2)
MAGNESIUM SERPL-MCNC: 1.4 MG/DL — LOW (ref 1.6–2.6)
MAGNESIUM SERPL-MCNC: 1.6 MG/DL — SIGNIFICANT CHANGE UP (ref 1.6–2.6)
MCHC RBC-ENTMCNC: 33.2 % — SIGNIFICANT CHANGE UP (ref 32–36)
MCHC RBC-ENTMCNC: 33.2 PG — SIGNIFICANT CHANGE UP (ref 27–34)
MCHC RBC-ENTMCNC: 33.3 PG — SIGNIFICANT CHANGE UP (ref 27–34)
MCHC RBC-ENTMCNC: 33.6 % — SIGNIFICANT CHANGE UP (ref 32–36)
MCV RBC AUTO: 100 FL — SIGNIFICANT CHANGE UP (ref 80–100)
MCV RBC AUTO: 99.3 FL — SIGNIFICANT CHANGE UP (ref 80–100)
NRBC # FLD: 0 — SIGNIFICANT CHANGE UP
NRBC # FLD: 0 — SIGNIFICANT CHANGE UP
PHOSPHATE SERPL-MCNC: 2.1 MG/DL — LOW (ref 2.5–4.5)
PHOSPHATE SERPL-MCNC: 2.6 MG/DL — SIGNIFICANT CHANGE UP (ref 2.5–4.5)
PLATELET # BLD AUTO: 308 K/UL — SIGNIFICANT CHANGE UP (ref 150–400)
PLATELET # BLD AUTO: 330 K/UL — SIGNIFICANT CHANGE UP (ref 150–400)
PMV BLD: 9.1 FL — SIGNIFICANT CHANGE UP (ref 7–13)
PMV BLD: 9.2 FL — SIGNIFICANT CHANGE UP (ref 7–13)
POTASSIUM SERPL-MCNC: 3.7 MMOL/L — SIGNIFICANT CHANGE UP (ref 3.5–5.3)
POTASSIUM SERPL-MCNC: 3.8 MMOL/L — SIGNIFICANT CHANGE UP (ref 3.5–5.3)
POTASSIUM SERPL-SCNC: 3.7 MMOL/L — SIGNIFICANT CHANGE UP (ref 3.5–5.3)
POTASSIUM SERPL-SCNC: 3.8 MMOL/L — SIGNIFICANT CHANGE UP (ref 3.5–5.3)
RBC # BLD: 2.65 M/UL — LOW (ref 3.8–5.2)
RBC # BLD: 2.73 M/UL — LOW (ref 3.8–5.2)
RBC # FLD: 15.3 % — HIGH (ref 10.3–14.5)
RBC # FLD: 15.4 % — HIGH (ref 10.3–14.5)
RH IG SCN BLD-IMP: POSITIVE — SIGNIFICANT CHANGE UP
SODIUM SERPL-SCNC: 135 MMOL/L — SIGNIFICANT CHANGE UP (ref 135–145)
SODIUM SERPL-SCNC: 139 MMOL/L — SIGNIFICANT CHANGE UP (ref 135–145)
SPECIMEN SOURCE: SIGNIFICANT CHANGE UP
WBC # BLD: 4.57 K/UL — SIGNIFICANT CHANGE UP (ref 3.8–10.5)
WBC # BLD: 4.93 K/UL — SIGNIFICANT CHANGE UP (ref 3.8–10.5)
WBC # FLD AUTO: 4.57 K/UL — SIGNIFICANT CHANGE UP (ref 3.8–10.5)
WBC # FLD AUTO: 4.93 K/UL — SIGNIFICANT CHANGE UP (ref 3.8–10.5)

## 2017-11-25 RX ORDER — VANCOMYCIN HCL 1 G
750 VIAL (EA) INTRAVENOUS EVERY 12 HOURS
Qty: 0 | Refills: 0 | Status: DISCONTINUED | OUTPATIENT
Start: 2017-11-25 | End: 2017-11-27

## 2017-11-25 RX ORDER — POTASSIUM PHOSPHATE, MONOBASIC POTASSIUM PHOSPHATE, DIBASIC 236; 224 MG/ML; MG/ML
30 INJECTION, SOLUTION INTRAVENOUS ONCE
Qty: 0 | Refills: 0 | Status: DISCONTINUED | OUTPATIENT
Start: 2017-11-25 | End: 2017-11-25

## 2017-11-25 RX ORDER — PANTOPRAZOLE SODIUM 20 MG/1
40 TABLET, DELAYED RELEASE ORAL EVERY 12 HOURS
Qty: 0 | Refills: 0 | Status: DISCONTINUED | OUTPATIENT
Start: 2017-11-25 | End: 2017-11-27

## 2017-11-25 RX ORDER — DEXTROSE MONOHYDRATE, SODIUM CHLORIDE, AND POTASSIUM CHLORIDE 50; .745; 4.5 G/1000ML; G/1000ML; G/1000ML
1000 INJECTION, SOLUTION INTRAVENOUS
Qty: 0 | Refills: 0 | Status: DISCONTINUED | OUTPATIENT
Start: 2017-11-25 | End: 2017-11-27

## 2017-11-25 RX ORDER — VANCOMYCIN HCL 1 G
VIAL (EA) INTRAVENOUS
Qty: 0 | Refills: 0 | Status: DISCONTINUED | OUTPATIENT
Start: 2017-11-25 | End: 2017-11-27

## 2017-11-25 RX ORDER — MORPHINE SULFATE 50 MG/1
4 CAPSULE, EXTENDED RELEASE ORAL ONCE
Qty: 0 | Refills: 0 | Status: DISCONTINUED | OUTPATIENT
Start: 2017-11-25 | End: 2017-11-25

## 2017-11-25 RX ORDER — MAGNESIUM SULFATE 500 MG/ML
1 VIAL (ML) INJECTION ONCE
Qty: 0 | Refills: 0 | Status: COMPLETED | OUTPATIENT
Start: 2017-11-25 | End: 2017-11-25

## 2017-11-25 RX ORDER — DIPHENHYDRAMINE HCL 50 MG
50 CAPSULE ORAL ONCE
Qty: 0 | Refills: 0 | Status: COMPLETED | OUTPATIENT
Start: 2017-11-25 | End: 2017-11-25

## 2017-11-25 RX ORDER — VANCOMYCIN HCL 1 G
750 VIAL (EA) INTRAVENOUS ONCE
Qty: 0 | Refills: 0 | Status: COMPLETED | OUTPATIENT
Start: 2017-11-25 | End: 2017-11-25

## 2017-11-25 RX ORDER — MAGNESIUM SULFATE 500 MG/ML
2 VIAL (ML) INJECTION
Qty: 0 | Refills: 0 | Status: COMPLETED | OUTPATIENT
Start: 2017-11-25 | End: 2017-11-25

## 2017-11-25 RX ORDER — VANCOMYCIN HCL 1 G
750 VIAL (EA) INTRAVENOUS EVERY 12 HOURS
Qty: 0 | Refills: 0 | Status: DISCONTINUED | OUTPATIENT
Start: 2017-11-25 | End: 2017-11-25

## 2017-11-25 RX ORDER — POTASSIUM PHOSPHATE, MONOBASIC POTASSIUM PHOSPHATE, DIBASIC 236; 224 MG/ML; MG/ML
15 INJECTION, SOLUTION INTRAVENOUS ONCE
Qty: 0 | Refills: 0 | Status: COMPLETED | OUTPATIENT
Start: 2017-11-25 | End: 2017-11-25

## 2017-11-25 RX ORDER — NITROFURANTOIN MACROCRYSTAL 50 MG
100 CAPSULE ORAL
Qty: 0 | Refills: 0 | Status: DISCONTINUED | OUTPATIENT
Start: 2017-11-25 | End: 2017-11-25

## 2017-11-25 RX ADMIN — MORPHINE SULFATE 4 MILLIGRAM(S): 50 CAPSULE, EXTENDED RELEASE ORAL at 01:05

## 2017-11-25 RX ADMIN — Medication 50 GRAM(S): at 15:28

## 2017-11-25 RX ADMIN — PANTOPRAZOLE SODIUM 40 MILLIGRAM(S): 20 TABLET, DELAYED RELEASE ORAL at 23:00

## 2017-11-25 RX ADMIN — DEXTROSE MONOHYDRATE, SODIUM CHLORIDE, AND POTASSIUM CHLORIDE 100 MILLILITER(S): 50; .745; 4.5 INJECTION, SOLUTION INTRAVENOUS at 00:12

## 2017-11-25 RX ADMIN — Medication 110 MILLIGRAM(S): at 23:00

## 2017-11-25 RX ADMIN — Medication 150 MILLIGRAM(S): at 11:20

## 2017-11-25 RX ADMIN — Medication 50 GRAM(S): at 13:54

## 2017-11-25 RX ADMIN — Medication 100 GRAM(S): at 06:45

## 2017-11-25 RX ADMIN — ENOXAPARIN SODIUM 50 MILLIGRAM(S): 100 INJECTION SUBCUTANEOUS at 18:34

## 2017-11-25 RX ADMIN — Medication 5 MILLIGRAM(S): at 18:34

## 2017-11-25 RX ADMIN — POTASSIUM PHOSPHATE, MONOBASIC POTASSIUM PHOSPHATE, DIBASIC 62.5 MILLIMOLE(S): 236; 224 INJECTION, SOLUTION INTRAVENOUS at 16:53

## 2017-11-25 RX ADMIN — Medication 150 MILLIGRAM(S): at 21:29

## 2017-11-25 RX ADMIN — Medication 5 MILLIGRAM(S): at 06:41

## 2017-11-25 RX ADMIN — Medication 50 MILLIGRAM(S): at 23:40

## 2017-11-25 RX ADMIN — MORPHINE SULFATE 4 MILLIGRAM(S): 50 CAPSULE, EXTENDED RELEASE ORAL at 00:46

## 2017-11-25 RX ADMIN — Medication 5 MILLIGRAM(S): at 11:19

## 2017-11-25 RX ADMIN — Medication 110 MILLIGRAM(S): at 13:54

## 2017-11-25 RX ADMIN — DEXTROSE MONOHYDRATE, SODIUM CHLORIDE, AND POTASSIUM CHLORIDE 75 MILLILITER(S): 50; .745; 4.5 INJECTION, SOLUTION INTRAVENOUS at 06:41

## 2017-11-25 RX ADMIN — ENOXAPARIN SODIUM 50 MILLIGRAM(S): 100 INJECTION SUBCUTANEOUS at 06:39

## 2017-11-25 RX ADMIN — PANTOPRAZOLE SODIUM 40 MILLIGRAM(S): 20 TABLET, DELAYED RELEASE ORAL at 13:54

## 2017-11-25 RX ADMIN — Medication 5 MILLIGRAM(S): at 23:00

## 2017-11-25 NOTE — PROGRESS NOTE ADULT - SUBJECTIVE AND OBJECTIVE BOX
VSS. pain improved. stoma has some gas and stool. Ng output noted. Had lengthy discussion with patient. regarding recurrent obstruction and carcinomatosis as cause. Have suggested placement of gastrostomy tube for paaliation. Patient will consider it. Continue current care. DHeld.

## 2017-11-25 NOTE — PROGRESS NOTE ADULT - ASSESSMENT
67 year old woman with ovarian cancer on chemotherapy s/p multiple abdominal surgeries including tumor debulking c/b colon perforation, sigmoid resection and hartmanns, s/p attempted reversal presents today with recurrent SBO.    - NPO IVF, change to maintenance   - Replete Magnesium and phosphorus   - Strict I/O monitoring  - IV antibiotics to treat UTI, Vanc and doxycycline   - Toradol for breakthrough abdominal pain and morphine if not effective  - Follow up curine culture  - Serial abdominal exams  - Therapeutic Lovenox for PE  - Plan discussed with A team

## 2017-11-25 NOTE — PROGRESS NOTE ADULT - SUBJECTIVE AND OBJECTIVE BOX
A Team Surgery Progress Note - pager [phone]    Patient is a 67y old  Female who presents with a chief complaint of no ostomy output (23 Nov 2017 20:41)    Interval events: Overnight the patient developed abdominal distention, tenderness, and severe abdominal pain amenable to only 4mg IV morphine. An NG tube was placed which so far has drained a total of 400ccs. Overnight she abdominal distention and tenderness improved and pain was eventually controlled. She was not started on any new standing pain medication.s     SUBJECTIVE: Patient seen and examined at bedside with surgical team. She currently has no complaints of pain and states she feels better than she did overnight. She has liquid stool coming out of her ostomy, no formed stool yet, denies nausea and vomiting.     VITALS  T(C): 36.7 (11-25-17 @ 05:17), Max: 36.7 (11-25-17 @ 05:17)  HR: 88 (11-25-17 @ 05:17) (66 - 88)  BP: 157/85 (11-25-17 @ 05:17) (127/69 - 367/91)  BP(mean): --  RR: 18 (11-25-17 @ 05:17) (16 - 20)  SpO2: 97% (11-25-17 @ 05:17) (97% - 100%)  Wt(kg): --  CAPILLARY BLOOD GLUCOSE          Is/Os    11-24 @ 07:01  -  11-25 @ 07:00  --------------------------------------------------------  IN:    dextrose 5% + sodium chloride 0.45%: 450 mL    IV PiggyBack: 350 mL  Total IN: 800 mL    OUT:    Colostomy: 50 mL    Nasoenteral Tube: 400 mL    Voided: 2150 mL  Total OUT: 2600 mL    Total NET: -1800 mL          PHYSICAL EXAM:  Physical Exam  General: A&Ox3, NAD  Abdominal: Soft nontender mildly distended, mildly tender, ostomy bag with minimal gas and minimal liquid stool. Old ostomy site dressing c/d/i      MEDICATIONS (STANDING): dextrose 5% + sodium chloride 0.9% with potassium chloride 20 mEq/L 1000 milliLiter(s) IV Continuous <Continuous>  doxycycline IVPB 100 milliGRAM(s) IV Intermittent every 12 hours  enoxaparin Injectable 50 milliGRAM(s) SubCutaneous two times a day  metoprolol    tartrate Injectable 5 milliGRAM(s) IV Push every 6 hours  pantoprazole  Injectable 40 milliGRAM(s) IV Push daily  vancomycin  IVPB 750 milliGRAM(s) IV Intermittent every 12 hours    MEDICATIONS (PRN):    LABS  CBC (11-25 @ 06:25)                              8.8<L>                         4.57    )----------------(  308        --    % Neutrophils, --    % Lymphocytes, ANC: --                                  26.5<L>  CBC (11-25 @ 00:55)                              9.1<L>                         4.93    )----------------(  330        --    % Neutrophils, --    % Lymphocytes, ANC: --                                  27.1<L>    BMP (11-25 @ 06:25)             139     |  100     |  7     		Ca++ --      Ca 7.7<L>             ---------------------------------( 100<H>		Mg 1.4<L>             3.7     |  26      |  0.60  			Ph 2.1<L>  BMP (11-25 @ 00:55)             135     |  97<L>   |  9     		Ca++ --      Ca 7.8<L>             ---------------------------------( 86    		Mg 1.6                3.8     |  23      |  0.63  			Ph 2.6             ABG (11-25 @ 06:25)      /  /  /  /  / %     Lactate:  0.7  ABG (11-25 @ 00:55)      /  /  /  /  / %     Lactate:  0.8        IMAGING STUDIES    ASSESSMENT  67y female s/p  ***    PLAN  - Diet:   - Pain control with PO/IV medications.    - Continue home medications  - OOB, ambulate as tolerated  - continue chemical VTE ppx  - Will discuss with attending.

## 2017-11-26 LAB
BUN SERPL-MCNC: 4 MG/DL — LOW (ref 7–23)
CALCIUM SERPL-MCNC: 7.9 MG/DL — LOW (ref 8.4–10.5)
CHLORIDE SERPL-SCNC: 99 MMOL/L — SIGNIFICANT CHANGE UP (ref 98–107)
CO2 SERPL-SCNC: 26 MMOL/L — SIGNIFICANT CHANGE UP (ref 22–31)
CREAT SERPL-MCNC: 0.7 MG/DL — SIGNIFICANT CHANGE UP (ref 0.5–1.3)
GLUCOSE SERPL-MCNC: 114 MG/DL — HIGH (ref 70–99)
HCT VFR BLD CALC: 26.8 % — LOW (ref 34.5–45)
HGB BLD-MCNC: 8.8 G/DL — LOW (ref 11.5–15.5)
MAGNESIUM SERPL-MCNC: 1.7 MG/DL — SIGNIFICANT CHANGE UP (ref 1.6–2.6)
MCHC RBC-ENTMCNC: 32.2 PG — SIGNIFICANT CHANGE UP (ref 27–34)
MCHC RBC-ENTMCNC: 32.8 % — SIGNIFICANT CHANGE UP (ref 32–36)
MCV RBC AUTO: 98.2 FL — SIGNIFICANT CHANGE UP (ref 80–100)
NRBC # FLD: 0 — SIGNIFICANT CHANGE UP
PHOSPHATE SERPL-MCNC: 2.1 MG/DL — LOW (ref 2.5–4.5)
PLATELET # BLD AUTO: 338 K/UL — SIGNIFICANT CHANGE UP (ref 150–400)
PMV BLD: 8.9 FL — SIGNIFICANT CHANGE UP (ref 7–13)
POTASSIUM SERPL-MCNC: 3.6 MMOL/L — SIGNIFICANT CHANGE UP (ref 3.5–5.3)
POTASSIUM SERPL-SCNC: 3.6 MMOL/L — SIGNIFICANT CHANGE UP (ref 3.5–5.3)
RBC # BLD: 2.73 M/UL — LOW (ref 3.8–5.2)
RBC # FLD: 15.4 % — HIGH (ref 10.3–14.5)
SODIUM SERPL-SCNC: 136 MMOL/L — SIGNIFICANT CHANGE UP (ref 135–145)
VANCOMYCIN TROUGH SERPL-MCNC: 14.7 UG/ML — SIGNIFICANT CHANGE UP (ref 10–20)
WBC # BLD: 6.47 K/UL — SIGNIFICANT CHANGE UP (ref 3.8–10.5)
WBC # FLD AUTO: 6.47 K/UL — SIGNIFICANT CHANGE UP (ref 3.8–10.5)

## 2017-11-26 RX ORDER — MAGNESIUM SULFATE 500 MG/ML
2 VIAL (ML) INJECTION ONCE
Qty: 0 | Refills: 0 | Status: COMPLETED | OUTPATIENT
Start: 2017-11-26 | End: 2017-11-26

## 2017-11-26 RX ADMIN — Medication 110 MILLIGRAM(S): at 14:54

## 2017-11-26 RX ADMIN — Medication 5 MILLIGRAM(S): at 19:11

## 2017-11-26 RX ADMIN — ENOXAPARIN SODIUM 50 MILLIGRAM(S): 100 INJECTION SUBCUTANEOUS at 06:00

## 2017-11-26 RX ADMIN — Medication 5 MILLIGRAM(S): at 23:13

## 2017-11-26 RX ADMIN — PANTOPRAZOLE SODIUM 40 MILLIGRAM(S): 20 TABLET, DELAYED RELEASE ORAL at 23:13

## 2017-11-26 RX ADMIN — Medication 5 MILLIGRAM(S): at 12:20

## 2017-11-26 RX ADMIN — Medication 150 MILLIGRAM(S): at 22:50

## 2017-11-26 RX ADMIN — PANTOPRAZOLE SODIUM 40 MILLIGRAM(S): 20 TABLET, DELAYED RELEASE ORAL at 12:20

## 2017-11-26 RX ADMIN — Medication 63.75 MILLIMOLE(S): at 19:12

## 2017-11-26 RX ADMIN — Medication 5 MILLIGRAM(S): at 06:00

## 2017-11-26 RX ADMIN — Medication 150 MILLIGRAM(S): at 10:21

## 2017-11-26 RX ADMIN — Medication 50 GRAM(S): at 16:47

## 2017-11-26 RX ADMIN — ENOXAPARIN SODIUM 50 MILLIGRAM(S): 100 INJECTION SUBCUTANEOUS at 19:10

## 2017-11-26 NOTE — PROVIDER CONTACT NOTE (OTHER) - ACTION/TREATMENT ORDERED:
MD Dias notified, team already aware of increased stoma size. No further intervention at this time. Will continue to closely monitor.

## 2017-11-26 NOTE — DIETITIAN INITIAL EVALUATION ADULT. - OTHER INFO
Nutrition Consult X Assessment. Pt 66 yo female with admit diagnosis: Small Bowel Obstruction. Pt appears alert, oriented. Pt NPO @ present. Pt stated colostomy putting out liquid out now. PTA Pt was eating Regular food reported. Pt's food allergies reviewed with Pt. Per Pt her UBW: ~120# (~1.5 years ago). Pt lost weight ~15# 2/2 cancer/chemo. No report of chewing/swallowing difficulty. No report of nausea/vomiting @ present. Colostomy nutrition therapy discussed, written materials provided as well. RDN remains available, Pt made aware.

## 2017-11-26 NOTE — PROVIDER CONTACT NOTE (OTHER) - ASSESSMENT
Pt. is a&oX4. BP elevated, MD aware, other VSS. Pt. saying her ostomy size has increased throughout the day. Pt. stomas protruding, about the size of a fist. Ostomy draining small amounts liquid stool. Pt. complaining of intermittant abdominal cramping which has decreased since admission. No n/v.

## 2017-11-26 NOTE — CHART NOTE - NSCHARTNOTEFT_GEN_A_CORE
NUTRITION SERVICES     Upon Nutritional Assessment by the Registered Dietitian your patient was determined to meet criteria/ has evidence of the following diagnosis/diagnoses:  [ ] Mild Protein Calorie Malnutrition   [X] Moderate Protein Calorie Malnutrition   [ ] Severe Protein Calorie Malnutrition   [ ] Unspecified Protein Calorie Malnutrition   [ ] Underweight / BMI <19  [ ] Morbid Obesity / BMI >40    Findings as based on:  •  Comprehensive nutrition assessment and consultation     Treatment:  The following diet has been recommended:

## 2017-11-26 NOTE — PROVIDER CONTACT NOTE (OTHER) - SITUATION
Pt. saying her ostomy size has increased throughout the day. Pt. stomas protruding, about the size of a fist.

## 2017-11-26 NOTE — DIETITIAN INITIAL EVALUATION ADULT. - PROBLEM SELECTOR PLAN 1
- NPO/IVF  - Replete electrolytes PRN  - Strict I/O monitoring  - IV antibiotics to treat UTI  - Serial abdominal exams  - Patient refusing NGT at this time; will reevaluate in the case of nausea/emesis or worsening exam  - Plan discussed with Dr. Zhou

## 2017-11-26 NOTE — PROGRESS NOTE ADULT - SUBJECTIVE AND OBJECTIVE BOX
A Team Surgery Progress Note - pager [phone]    Patient is a 67y old  Female who presents with a chief complaint of no ostomy output (23 Nov 2017 20:41)    SUBJECTIVE: Patient seen and examined at bedside with surgical team. She currently has no complaints of pain and states she feels better than she did yesterday, she would like the NGT removed. Pt. saying her ostomy size has increased throughout the day, protruding, about the size of a fist. She has liquid stool coming out of her ostomy, no formed stool yet, denies nausea and vomiting.     VITALS  T(C): 36.6 (11-26-17 @ 05:57), Max: 36.8 (11-25-17 @ 18:53)  HR: 98 (11-26-17 @ 05:57) (73 - 98)  BP: 158/94 (11-26-17 @ 05:57) (149/80 - 186/98)  RR: 18 (11-26-17 @ 05:57) (16 - 18)  SpO2: 97% (11-26-17 @ 05:57) (97% - 99%)  Wt(kg): --    11-25 @ 07:01  -  11-26 @ 07:00  --------------------------------------------------------  IN:    dextrose 5% + sodium chloride 0.9% with potassium chloride 20 mEq/L: 1125 mL    IV PiggyBack: 600 mL  Total IN: 1725 mL    OUT:    Colostomy: 300 mL    Nasoenteral Tube: 350 mL    Voided: 1600 mL  Total OUT: 2250 mL    Total NET: -525 mL      11-26 @ 07:01  -  11-26 @ 10:15  --------------------------------------------------------  IN:  Total IN: 0 mL    OUT:    Nasoenteral Tube: 50 mL  Total OUT: 50 mL    Total NET: -50 mL        PHYSICAL EXAM:  Physical Exam  General: A&Ox3, NAD  Abdominal: Soft nontender mildly distended, mildly tender, ostomy bag with minimal gas and minimal liquid stool. Old ostomy site dressing c/d/i. No change in stoma size.                            8.8    6.47  )-----------( 338      ( 26 Nov 2017 06:05 )             26.8     11-26    136  |  99  |  4<L>  ----------------------------<  114<H>  3.6   |  26  |  0.70    Ca    7.9<L>      26 Nov 2017 06:05  Phos  2.1     11-26  Mg     1.7     11-26    Culture - Urine (11.24.17 @ 09:48)    Culture - Urine:   NO GROWTH AT 24 HOURS    Specimen Source: URINE MIDSTREAM

## 2017-11-26 NOTE — PROGRESS NOTE ADULT - ASSESSMENT
67 year old woman with ovarian cancer on chemotherapy s/p multiple abdominal surgeries including tumor debulking c/b colon perforation, sigmoid resection and hartmanns, s/p attempted reversal presents today with recurrent SBO.    - NPO/NGT. Clamp trial today   - Strict I/O monitoring  - IV antibiotics to treat UTI, Vanc and doxycycline   - Toradol for breakthrough abdominal pain and morphine if not effective  - f/u urine culture: no growth  - Serial abdominal exams  - Therapeutic Lovenox for PE      A team [id number]

## 2017-11-26 NOTE — DIETITIAN INITIAL EVALUATION ADULT. - NS AS NUTRI INTERV MEALS SNACK
Other (specify)/Diets modified for specific foods and ingredients/1. Suggest: Once & when clinically indicated resume PO diet: Clear Liquids, if Pt tolerates well advance to Full liquids then to [place] Soft, Low Fiber;             2. Once PO diet resumed monitor PO diet tolerance;                 3. Monitor labs, weights, hydration status;               4. If Pt to remains NPO or unable to use GUT suggest initiating nutrition support (TPN);

## 2017-11-27 LAB
APPEARANCE UR: SIGNIFICANT CHANGE UP
BACTERIA # UR AUTO: SIGNIFICANT CHANGE UP
BILIRUB UR-MCNC: NEGATIVE — SIGNIFICANT CHANGE UP
BLOOD UR QL VISUAL: HIGH
BUN SERPL-MCNC: 4 MG/DL — LOW (ref 7–23)
CALCIUM SERPL-MCNC: 8.7 MG/DL — SIGNIFICANT CHANGE UP (ref 8.4–10.5)
CHLORIDE SERPL-SCNC: 99 MMOL/L — SIGNIFICANT CHANGE UP (ref 98–107)
CO2 SERPL-SCNC: 26 MMOL/L — SIGNIFICANT CHANGE UP (ref 22–31)
COLOR SPEC: HIGH
CREAT SERPL-MCNC: 0.7 MG/DL — SIGNIFICANT CHANGE UP (ref 0.5–1.3)
GLUCOSE SERPL-MCNC: 96 MG/DL — SIGNIFICANT CHANGE UP (ref 70–99)
GLUCOSE UR-MCNC: NEGATIVE — SIGNIFICANT CHANGE UP
HCT VFR BLD CALC: 28.7 % — LOW (ref 34.5–45)
HGB BLD-MCNC: 9.3 G/DL — LOW (ref 11.5–15.5)
KETONES UR-MCNC: NEGATIVE — SIGNIFICANT CHANGE UP
LEUKOCYTE ESTERASE UR-ACNC: HIGH
MAGNESIUM SERPL-MCNC: 1.6 MG/DL — SIGNIFICANT CHANGE UP (ref 1.6–2.6)
MCHC RBC-ENTMCNC: 32 PG — SIGNIFICANT CHANGE UP (ref 27–34)
MCHC RBC-ENTMCNC: 32.4 % — SIGNIFICANT CHANGE UP (ref 32–36)
MCV RBC AUTO: 98.6 FL — SIGNIFICANT CHANGE UP (ref 80–100)
NITRITE UR-MCNC: NEGATIVE — SIGNIFICANT CHANGE UP
NRBC # FLD: 0 — SIGNIFICANT CHANGE UP
PH UR: 6 — SIGNIFICANT CHANGE UP (ref 4.6–8)
PHOSPHATE SERPL-MCNC: 2.4 MG/DL — LOW (ref 2.5–4.5)
PLATELET # BLD AUTO: 377 K/UL — SIGNIFICANT CHANGE UP (ref 150–400)
PMV BLD: 8.8 FL — SIGNIFICANT CHANGE UP (ref 7–13)
POTASSIUM SERPL-MCNC: 4 MMOL/L — SIGNIFICANT CHANGE UP (ref 3.5–5.3)
POTASSIUM SERPL-SCNC: 4 MMOL/L — SIGNIFICANT CHANGE UP (ref 3.5–5.3)
PROT UR-MCNC: 30 — HIGH
RBC # BLD: 2.91 M/UL — LOW (ref 3.8–5.2)
RBC # FLD: 15.3 % — HIGH (ref 10.3–14.5)
RBC CASTS # UR COMP ASSIST: >50 — HIGH (ref 0–?)
SODIUM SERPL-SCNC: 138 MMOL/L — SIGNIFICANT CHANGE UP (ref 135–145)
SP GR SPEC: 1.01 — SIGNIFICANT CHANGE UP (ref 1–1.03)
UROBILINOGEN FLD QL: NORMAL E.U. — SIGNIFICANT CHANGE UP (ref 0.1–0.2)
WBC # BLD: 6.19 K/UL — SIGNIFICANT CHANGE UP (ref 3.8–10.5)
WBC # FLD AUTO: 6.19 K/UL — SIGNIFICANT CHANGE UP (ref 3.8–10.5)
WBC UR QL: SIGNIFICANT CHANGE UP (ref 0–?)

## 2017-11-27 PROCEDURE — 99232 SBSQ HOSP IP/OBS MODERATE 35: CPT

## 2017-11-27 RX ORDER — ZOLPIDEM TARTRATE 10 MG/1
10 TABLET ORAL AT BEDTIME
Qty: 0 | Refills: 0 | Status: DISCONTINUED | OUTPATIENT
Start: 2017-11-27 | End: 2017-11-27

## 2017-11-27 RX ORDER — MAGNESIUM SULFATE 500 MG/ML
2 VIAL (ML) INJECTION ONCE
Qty: 0 | Refills: 0 | Status: COMPLETED | OUTPATIENT
Start: 2017-11-27 | End: 2017-11-27

## 2017-11-27 RX ORDER — SIMVASTATIN 20 MG/1
10 TABLET, FILM COATED ORAL AT BEDTIME
Qty: 0 | Refills: 0 | Status: DISCONTINUED | OUTPATIENT
Start: 2017-11-27 | End: 2017-11-28

## 2017-11-27 RX ORDER — DEXTROSE MONOHYDRATE, SODIUM CHLORIDE, AND POTASSIUM CHLORIDE 50; .745; 4.5 G/1000ML; G/1000ML; G/1000ML
1000 INJECTION, SOLUTION INTRAVENOUS
Qty: 0 | Refills: 0 | Status: DISCONTINUED | OUTPATIENT
Start: 2017-11-27 | End: 2017-11-28

## 2017-11-27 RX ORDER — LANOLIN ALCOHOL/MO/W.PET/CERES
3 CREAM (GRAM) TOPICAL AT BEDTIME
Qty: 0 | Refills: 0 | Status: DISCONTINUED | OUTPATIENT
Start: 2017-11-27 | End: 2017-11-28

## 2017-11-27 RX ORDER — PANTOPRAZOLE SODIUM 20 MG/1
40 TABLET, DELAYED RELEASE ORAL
Qty: 0 | Refills: 0 | Status: DISCONTINUED | OUTPATIENT
Start: 2017-11-27 | End: 2017-11-28

## 2017-11-27 RX ORDER — CARVEDILOL PHOSPHATE 80 MG/1
6.25 CAPSULE, EXTENDED RELEASE ORAL EVERY 12 HOURS
Qty: 0 | Refills: 0 | Status: DISCONTINUED | OUTPATIENT
Start: 2017-11-27 | End: 2017-11-28

## 2017-11-27 RX ORDER — ZOLPIDEM TARTRATE 10 MG/1
5 TABLET ORAL AT BEDTIME
Qty: 0 | Refills: 0 | Status: DISCONTINUED | OUTPATIENT
Start: 2017-11-27 | End: 2017-11-28

## 2017-11-27 RX ORDER — LANOLIN ALCOHOL/MO/W.PET/CERES
3 CREAM (GRAM) TOPICAL ONCE
Qty: 0 | Refills: 0 | Status: COMPLETED | OUTPATIENT
Start: 2017-11-27 | End: 2017-11-27

## 2017-11-27 RX ADMIN — Medication 63.75 MILLIMOLE(S): at 11:01

## 2017-11-27 RX ADMIN — Medication 3 MILLIGRAM(S): at 04:20

## 2017-11-27 RX ADMIN — DEXTROSE MONOHYDRATE, SODIUM CHLORIDE, AND POTASSIUM CHLORIDE 75 MILLILITER(S): 50; .745; 4.5 INJECTION, SOLUTION INTRAVENOUS at 09:28

## 2017-11-27 RX ADMIN — PANTOPRAZOLE SODIUM 40 MILLIGRAM(S): 20 TABLET, DELAYED RELEASE ORAL at 11:05

## 2017-11-27 RX ADMIN — Medication 110 MILLIGRAM(S): at 13:43

## 2017-11-27 RX ADMIN — Medication 110 MILLIGRAM(S): at 00:13

## 2017-11-27 RX ADMIN — Medication 5 MILLIGRAM(S): at 05:03

## 2017-11-27 RX ADMIN — ENOXAPARIN SODIUM 50 MILLIGRAM(S): 100 INJECTION SUBCUTANEOUS at 05:04

## 2017-11-27 RX ADMIN — ZOLPIDEM TARTRATE 5 MILLIGRAM(S): 10 TABLET ORAL at 23:46

## 2017-11-27 RX ADMIN — ENOXAPARIN SODIUM 50 MILLIGRAM(S): 100 INJECTION SUBCUTANEOUS at 17:36

## 2017-11-27 RX ADMIN — SIMVASTATIN 10 MILLIGRAM(S): 20 TABLET, FILM COATED ORAL at 21:18

## 2017-11-27 RX ADMIN — Medication 5 MILLIGRAM(S): at 11:05

## 2017-11-27 RX ADMIN — Medication 150 MILLIGRAM(S): at 09:28

## 2017-11-27 RX ADMIN — Medication 50 GRAM(S): at 07:54

## 2017-11-27 RX ADMIN — CARVEDILOL PHOSPHATE 6.25 MILLIGRAM(S): 80 CAPSULE, EXTENDED RELEASE ORAL at 17:36

## 2017-11-27 NOTE — PROGRESS NOTE ADULT - ASSESSMENT
67 year old woman with ovarian cancer on chemotherapy s/p multiple abdominal surgeries including tumor debulking c/b colon perforation, sigmoid resection and hartmanns, s/p attempted reversal presents today with recurrent SBO.    - NPO. Adv to CLD  - Strict I/O monitoring  - D/C IV abx  - Toradol for breakthrough abdominal pain and morphine if not effective  - f/u urine culture: no growth  - Serial abdominal exams  - Therapeutic Lovenox for PE      A team [id number]

## 2017-11-27 NOTE — PROGRESS NOTE ADULT - SUBJECTIVE AND OBJECTIVE BOX
A Team Surgery Progress Note - pager [phone]    Patient is a 67y old  Female who presents with a chief complaint of no ostomy output (23 Nov 2017 20:41)    SUBJECTIVE: Patient seen and examined at bedside with surgical team. She currently has no complaints of pain and states she feels good. States her NGT fell out yesterday after sneezing. Denies nausea and vomiting. Pt is adamant that she will eat soup/food today. Denies fevers/chills.     VITALS  T(C): 36.6 (11-27-17 @ 05:00), Max: 36.7 (11-26-17 @ 22:31)  HR: 85 (11-27-17 @ 05:00) (69 - 89)  BP: 162/99 (11-27-17 @ 05:00) (154/88 - 168/97)  RR: 18 (11-27-17 @ 05:00) (18 - 18)  SpO2: 97% (11-27-17 @ 05:00) (97% - 99%)  Wt(kg): --    11-26 @ 07:01  -  11-27 @ 07:00  --------------------------------------------------------  IN:    dextrose 5% + sodium chloride 0.9% with potassium chloride 20 mEq/L: 600 mL    IV PiggyBack: 350 mL  Total IN: 950 mL    OUT:    Colostomy: 100 mL    Nasoenteral Tube: 50 mL    Voided: 2015 mL  Total OUT: 2165 mL    Total NET: -1215 mL      PHYSICAL EXAM:  Physical Exam  General: A&Ox3, NAD  Abdominal: Soft nontender mildly distended, mildly tender, ostomy bag with minimal gas and liquid stool. Old ostomy site dressing c/d/i. No change in stoma size.                                        9.3    6.19  )-----------( 377      ( 27 Nov 2017 05:00 )             28.7            11-27    138  |  99  |  4<L>  ----------------------------<  96  4.0   |  26  |  0.70    Ca    8.7      27 Nov 2017 05:00  Phos  2.4     11-27  Mg     1.6     11-27

## 2017-11-28 ENCOUNTER — TRANSCRIPTION ENCOUNTER (OUTPATIENT)
Age: 67
End: 2017-11-28

## 2017-11-28 VITALS
DIASTOLIC BLOOD PRESSURE: 90 MMHG | RESPIRATION RATE: 20 BRPM | SYSTOLIC BLOOD PRESSURE: 138 MMHG | HEART RATE: 99 BPM | TEMPERATURE: 99 F | OXYGEN SATURATION: 98 %

## 2017-11-28 LAB
BUN SERPL-MCNC: 5 MG/DL — LOW (ref 7–23)
CALCIUM SERPL-MCNC: 8.6 MG/DL — SIGNIFICANT CHANGE UP (ref 8.4–10.5)
CHLORIDE SERPL-SCNC: 100 MMOL/L — SIGNIFICANT CHANGE UP (ref 98–107)
CO2 SERPL-SCNC: 23 MMOL/L — SIGNIFICANT CHANGE UP (ref 22–31)
CREAT SERPL-MCNC: 0.69 MG/DL — SIGNIFICANT CHANGE UP (ref 0.5–1.3)
GLUCOSE SERPL-MCNC: 84 MG/DL — SIGNIFICANT CHANGE UP (ref 70–99)
HCT VFR BLD CALC: 25.8 % — LOW (ref 34.5–45)
HGB BLD-MCNC: 8.8 G/DL — LOW (ref 11.5–15.5)
MAGNESIUM SERPL-MCNC: 1.3 MG/DL — LOW (ref 1.6–2.6)
MCHC RBC-ENTMCNC: 33.2 PG — SIGNIFICANT CHANGE UP (ref 27–34)
MCHC RBC-ENTMCNC: 34.1 % — SIGNIFICANT CHANGE UP (ref 32–36)
MCV RBC AUTO: 97.4 FL — SIGNIFICANT CHANGE UP (ref 80–100)
NRBC # FLD: 0 — SIGNIFICANT CHANGE UP
PHOSPHATE SERPL-MCNC: 3.5 MG/DL — SIGNIFICANT CHANGE UP (ref 2.5–4.5)
PLATELET # BLD AUTO: 358 K/UL — SIGNIFICANT CHANGE UP (ref 150–400)
PMV BLD: 9.1 FL — SIGNIFICANT CHANGE UP (ref 7–13)
POTASSIUM SERPL-MCNC: 4.2 MMOL/L — SIGNIFICANT CHANGE UP (ref 3.5–5.3)
POTASSIUM SERPL-SCNC: 4.2 MMOL/L — SIGNIFICANT CHANGE UP (ref 3.5–5.3)
RBC # BLD: 2.65 M/UL — LOW (ref 3.8–5.2)
RBC # FLD: 15.4 % — HIGH (ref 10.3–14.5)
SODIUM SERPL-SCNC: 137 MMOL/L — SIGNIFICANT CHANGE UP (ref 135–145)
WBC # BLD: 6.32 K/UL — SIGNIFICANT CHANGE UP (ref 3.8–10.5)
WBC # FLD AUTO: 6.32 K/UL — SIGNIFICANT CHANGE UP (ref 3.8–10.5)

## 2017-11-28 PROCEDURE — 99238 HOSP IP/OBS DSCHRG MGMT 30/<: CPT

## 2017-11-28 RX ORDER — MAGNESIUM SULFATE 500 MG/ML
2 VIAL (ML) INJECTION ONCE
Qty: 0 | Refills: 0 | Status: COMPLETED | OUTPATIENT
Start: 2017-11-28 | End: 2017-11-28

## 2017-11-28 RX ADMIN — ENOXAPARIN SODIUM 50 MILLIGRAM(S): 100 INJECTION SUBCUTANEOUS at 15:52

## 2017-11-28 RX ADMIN — PANTOPRAZOLE SODIUM 40 MILLIGRAM(S): 20 TABLET, DELAYED RELEASE ORAL at 06:32

## 2017-11-28 RX ADMIN — Medication 50 GRAM(S): at 13:51

## 2017-11-28 RX ADMIN — ENOXAPARIN SODIUM 50 MILLIGRAM(S): 100 INJECTION SUBCUTANEOUS at 06:31

## 2017-11-28 RX ADMIN — ZOLPIDEM TARTRATE 5 MILLIGRAM(S): 10 TABLET ORAL at 01:44

## 2017-11-28 RX ADMIN — CARVEDILOL PHOSPHATE 6.25 MILLIGRAM(S): 80 CAPSULE, EXTENDED RELEASE ORAL at 06:32

## 2017-11-28 NOTE — DISCHARGE NOTE ADULT - NS AS ACTIVITY OBS
Do not make important decisions/No Heavy lifting/straining/Bathing allowed/Walking-Outdoors allowed/Showering allowed Bathing allowed/Walking-Outdoors allowed/Walking-Indoors allowed/Do not make important decisions/No Heavy lifting/straining/Showering allowed

## 2017-11-28 NOTE — DISCHARGE NOTE ADULT - CARE PLAN
Goal:	Recovery from SBO, continue advancing diet  Instructions for follow-up, activity and diet:	Patient was advised to follow-up with their surgeon in 1-2 weeks and call the office with any questions or concerns. Principal Discharge DX:	SBO (small bowel obstruction)  Goal:	Recovery from SBO, continue diet  Instructions for follow-up, activity and diet:	Continue a low residue diet.  Monitor stool output.  It should thicken up as time goes on.  Keep hydrated with water.  If ostomy output increases greater than 1liter in 24 hours, call Dr. Zhou's office.  If you have any further emesis or inability to tolerate food or if ostomy stops functioning, call Doctor Winnie's office or go to your nearest emergency room.  Follow-up with your surgeon (Dr. Zhou) in 1-2 weeks and call the office with any questions or concerns.  Please follow up with your Primary Care Doctor/Cardiologist within 1 week to review recent hospitalization, medical problems and medications especially your blood pressure.  Restart home Xarelto 11/29 (tomorrow)  You received two doses of therapeutic Lovenox prior to leaving the hospital on 11/28 which substitutes for the Xarelto. Principal Discharge DX:	SBO (small bowel obstruction)  Goal:	Recovery from SBO, continue diet  Instructions for follow-up, activity and diet:	Continue a low residue diet.  Monitor stool output.  It should thicken up as time goes on.  Keep hydrated with water.  If ostomy output increases greater than 1liter in 24 hours, call Dr. Zhou's office.  If you have any further emesis or inability to tolerate food or if ostomy stops functioning, call Doctor Winnie's office or go to your nearest emergency room.  Follow-up with your surgeon (Dr. Zhou) in 1-2 weeks and call the office with any questions or concerns.  Please follow up with your Primary Care Doctor/Cardiologist within 1 week to review recent hospitalization, medical problems and medications especially your blood pressure.  Restart home Xarelto 11/29 (tomorrow)  You received two doses of therapeutic Lovenox prior to leaving the hospital on 11/28 which substitutes for the Xarelto.  Please follow up with your Urologist Dr. Rubén Ramirez about your previous acute on chronic UTI being treated prior to hospitalization.  Please call today for instructions and an appointment within next few days. Principal Discharge DX:	SBO (small bowel obstruction)  Goal:	Recovery from SBO, continue diet  Instructions for follow-up, activity and diet:	Continue a low residue diet.  Monitor stool output.  It should thicken up as time goes on.  Keep hydrated with water.  If ostomy output increases greater than 1liter in 24 hours, call Dr. Zhou's office.  If you have any further emesis or inability to tolerate food or if ostomy stops functioning, call Doctor Winnie's office or go to your nearest emergency room.  Follow-up with your surgeon (Dr. Zhou) in 1-2 weeks and call the office with any questions or concerns.  Please follow up with your Primary Care Doctor/Cardiologist within 1 week to review recent hospitalization, medical problems and medications especially your blood pressure.  Restart home Xarelto 11/29 (tomorrow)  You received two doses of therapeutic Lovenox prior to leaving the hospital on 11/28 which substitutes for the Xarelto.  Per your Urologist Dr. Rubén Ramirez do not take any further Levaquin or Doxycycline at this time.  Start your Methenimine and follow up in his office in 2 weeks for a repeat urine culture which has already been ordered.  You may go to the office M-F any time between 8am and 5pm.  After culture is collected, call office in 3 days for results.

## 2017-11-28 NOTE — DISCHARGE NOTE ADULT - MEDICATION SUMMARY - MEDICATIONS TO STOP TAKING
I will STOP taking the medications listed below when I get home from the hospital:    doxycycline monohydrate 100 mg oral capsule  -- 1 cap(s) by mouth every 12 hours

## 2017-11-28 NOTE — DISCHARGE NOTE ADULT - ADDITIONAL INSTRUCTIONS
Continue a low residue diet.  Monitor stool output.  It should thicken up as time goes on.  Keep hydrated with water.  If ostomy output increases greater than 1liter in 24 hours, call Dr. Zhou's office.  If you have any further emesis or inability to tolerate food or if ostomy stops functioning, call Doctor Winnie's office or go to your nearest emergency room.  Follow-up with your surgeon (Dr. Zhou) in 1-2 weeks and call the office with any questions or concerns.  Please follow up with your Primary Care Doctor/Cardiologist within 1 week to review recent hospitalization, medical problems and medications especially your blood pressure.  Restart home Xarelto 11/29 (tomorrow)  You received two doses of therapeutic Lovenox prior to leaving the hospital on 11/28 which substitutes for the Xarelto. Continue a low residue diet.  Monitor stool output.  It should thicken up as time goes on.  Keep hydrated with water.  If ostomy output increases greater than 1liter in 24 hours, call Dr. Zhou's office.  If you have any further emesis or inability to tolerate food or if ostomy stops functioning, call Doctor Winnie's office or go to your nearest emergency room.  Follow-up with your surgeon (Dr. Zhou) in 1-2 weeks and call the office with any questions or concerns.  Please follow up with your Primary Care Doctor/Cardiologist within 1 week to review recent hospitalization, medical problems and medications especially your blood pressure.  Restart home Xarelto 11/29 (tomorrow)  You received two doses of therapeutic Lovenox prior to leaving the hospital on 11/28 which substitutes for the Xarelto.  Per your Urologist Dr. Rubén Ramirez do not take any further Levaquin or Doxycycline at this time.  Start your Methenimine and follow up in his office in 2 weeks for a repeat urine culture which has already been ordered.  You may go to the office M-F any time between 8am and 5pm.  After culture is collected, call office in 3 days for results.

## 2017-11-28 NOTE — DISCHARGE NOTE ADULT - HOSPITAL COURSE
67 year old woman with ovarian cancer on chemotherapy (follows with oncology at [facility]) who was recently admitted to [place] for SBO and was treated nonoperatively with nasogastric decompression.  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  She became concerned because her ostomy bag was not filling with gas and stool and had episodes of emesis. She was thus admitted out of concern for SBO and made NPO. On day one of her hospitalization she was monitored and was doing well until the evening when she started to develop 10/10 abdominal pain, distention and tenderness. And NGT was placed immediately draining 400ccs and her pain was controlled with 4mg of morphine. The following day she was monitored with decreased NGT output and improving symptoms. The NGT was removed the day after that and she was advanced to a clear liquid diet. She tolerated the diet well with a colostomy that was more productive of stool and gas. 67 year old woman with ovarian cancer on chemotherapy (follows with oncology at [facility]) who was recently admitted to [place] for SBO and was treated nonoperatively with nasogastric decompression.  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  She became concerned because her ostomy bag over the ECF was not filling with gas and stool and had episodes of emesis. She was thus admitted out of concern for SBO and made NPO. On day one of her hospitalization she was monitored and was doing well until the evening when she started to develop 10/10 abdominal pain, distention and tenderness. And NGT was placed immediately draining 400ccs and her pain was controlled with 4mg of morphine. The following day she was monitored with decreased NGT output and improving symptoms. The NGT was removed the day after that and she was advanced to a clear liquid diet. She tolerated the diet well with a pouch over the ECF that was more productive of stool and gas. 67 year old woman with ovarian cancer on chemotherapy (follows with oncology at [facility]) who was recently admitted to [place] for SBO and was treated nonoperatively with nasogastric decompression.  She presented to 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  She became concerned because her ostomy bag over the ECF was not filling with gas and stool and had episodes of emesis. She was thus admitted out of concern for SBO and made NPO. On day one of her hospitalization she was monitored and was doing well until the evening when she started to develop 10/10 abdominal pain, distention and tenderness. And NGT was placed immediately draining 400ccs and her pain was controlled with 4mg of morphine. The following day she was monitored with decreased NGT output and improving symptoms. The NGT was removed the day after that and she was advanced to a clear liquid diet. She tolerated the diet well with a pouch over the ECF that was more productive of stool and gas.  Diet was advanced to Low Residue diet and was tolerated without nausea/emesis/pain. Patient is voiding and ambulating without difficulty and is stable for discharge home and follow up as above.  Patient instructed to restart home Xarelto 11/29 (tomorrow) as she received two doses of therapeutic Lovenox prior to leaving the hospital on 11/28.  Patient and  understand and agree with above. 67 year old woman with ovarian cancer on chemotherapy (follows with oncology at [facility] [place]) who was recently admitted to [place] for SBO and was treated nonoperatively with nasogastric decompression.  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  She became concerned because her ostomy bag over the ECF was not filling with gas and stool and had episodes of emesis. She was thus admitted out of concern for SBO and made NPO. On day one of her hospitalization she was monitored and was doing well until the evening when she started to develop 10/10 abdominal pain, distention and tenderness. And NGT was placed immediately draining 400ccs and her pain was controlled with 4mg of morphine. The following day she was monitored with decreased NGT output and improving symptoms. The NGT was removed the day after that and she was advanced to a clear liquid diet. She tolerated the diet well with a pouch over the ECF that was more productive of stool and gas.  Diet was advanced to Low Residue diet and was tolerated without nausea/emesis/pain.  Patient was started on Doxycycline for a UTI by Dr. Rubén Ramirez prior to admission to [facility].  Patient received 3 days more of treatment while in hospital.  On 11/24, patient had a negative urine culture.  On 11/27 patient had a UA sent which showed negative nitrite positive LE (urine culture was sent and is currrently testing).  Patient has no urinary symptoms, WBC 6 and no fever.  Patient has been instructed to follow up with Dr. Ramirez as soon as possible for further instructions.  Call was placed to Dr. Ramirez prior to discharge but patient did not want to wait for his call back.  Patient is voiding and ambulating without difficulty and is stable for discharge home and follow up as above.  Patient instructed to restart home Xarelto 11/29 (tomorrow) as she received two doses of therapeutic Lovenox prior to leaving the hospital on 11/28.  Patient and  understand and agree with above. 67 year old woman with ovarian cancer on chemotherapy (follows with oncology at [facility]) who was recently admitted to [place] for SBO and was treated nonoperatively with nasogastric decompression.  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  She became concerned because her ostomy bag over the ECF was not filling with gas and stool and had episodes of emesis. She was thus admitted out of concern for SBO and made NPO. On day one of her hospitalization she was monitored and was doing well until the evening when she started to develop 10/10 abdominal pain, distention and tenderness. And NGT was placed immediately draining 400ccs and her pain was controlled with 4mg of morphine. The following day she was monitored with decreased NGT output and improving symptoms. The NGT was removed the day after that and she was advanced to a clear liquid diet. She tolerated the diet well with a pouch over the ECF that was more productive of stool and gas.  Diet was advanced to Low Residue diet and was tolerated without nausea/emesis/pain.  Patient was treated for for a UTI by Dr. Rubén Ramirez prior to admission to [facility].  Patient received 3 days more of treatment while in hospital.  On 11/24, patient had a negative urine culture.  On 11/27 patient had a UA sent which showed negative nitrite positive LE (urine culture was sent and is currrently testing).  Patient has no urinary symptoms, WBC 6 and no fever.  Per your Urologist Dr. Rubén Ramirez do not take any further Levaquin or Doxycycline at this time.  Start your Methenimine and follow up in his office in 2 weeks for a repeat urine culture which has already been ordered.  You may go to the office M-F any time between 8am and 5pm.  After culture is collected, call office in 3 days for results.  Patient is voiding and ambulating without difficulty and is stable for discharge home and follow up as above.  Patient instructed to restart home Xarelto 11/29 (tomorrow) as she received two doses of therapeutic Lovenox prior to leaving the hospital on 11/28.  Patient and  understand and agree with above.

## 2017-11-28 NOTE — DISCHARGE NOTE ADULT - CARE PROVIDER_API CALL
Sloan Zhou), ColonRectal Surgery; Surgery  [address] [place] [address]  Phone: [phone]  Fax: [phone]

## 2017-11-28 NOTE — DISCHARGE NOTE ADULT - MEDICATION SUMMARY - MEDICATIONS TO TAKE
I will START or STAY ON the medications listed below when I get home from the hospital:    Xarelto 20 mg oral tablet  -- 1 tab(s) by mouth once a day (in the evening)  -- Indication: For Home Medication (Blood Thinner - h/o PE)    simvastatin 10 mg oral tablet  -- 1 tab(s) by mouth once a day (at bedtime)  -- Indication: For Home Medication (high cholesterol)    carvedilol 6.25 mg oral tablet  -- 1 tab(s) by mouth 2 times a day  -- Indication: For Home Medication (high blood pressure)    Protonix 40 mg oral delayed release tablet  -- 1 tab(s) by mouth once a day  -- Indication: For Home Medication (Reflux)

## 2017-11-28 NOTE — PROGRESS NOTE ADULT - SUBJECTIVE AND OBJECTIVE BOX
A Team Surgery Progress Note - pager [phone]    SUBJECTIVE:   Pt seen + examined. No specific complaints. Otherwise he denies nausea/vomiting, fever/chills, CP/SOB. Pain is well controlled.  Tolerating her clear liquid diet well.     VITALS  T(C): 37.1 (11-28-17 @ 13:56), Max: 37.1 (11-28-17 @ 13:56)  HR: 99 (11-28-17 @ 13:56) (73 - 99)  BP: 138/90 (11-28-17 @ 13:56) (138/90 - 167/97)  BP(mean): --  RR: 20 (11-28-17 @ 13:56) (17 - 20)  SpO2: 98% (11-28-17 @ 13:56) (97% - 100%)  Wt(kg): --  CAPILLARY BLOOD GLUCOSE          Is/Os    11-27 @ 07:01  -  11-28 @ 07:00  --------------------------------------------------------  IN:    dextrose 5% + sodium chloride 0.9% with potassium chloride 20 mEq/L: 675 mL    dextrose 5% + sodium chloride 0.9% with potassium chloride 20 mEq/L: 700 mL    IV PiggyBack: 350 mL  Total IN: 1725 mL    OUT:    Colostomy: 995 mL    Voided: 900 mL  Total OUT: 1895 mL    Total NET: -170 mL      PHYSICAL EXAM:  Physical Exam  General: A&Ox3, NAD  Abdominal: Soft nontender mildly distended, mildly tender, ostomy bag with gas and stool in bag. Old ostomy site dressing c/d/i. No change in stoma size.    MEDICATIONS (STANDING): carvedilol 6.25 milliGRAM(s) Oral every 12 hours  enoxaparin Injectable 50 milliGRAM(s) SubCutaneous two times a day  melatonin 3 milliGRAM(s) Oral at bedtime  pantoprazole    Tablet 40 milliGRAM(s) Oral before breakfast  simvastatin 10 milliGRAM(s) Oral at bedtime    MEDICATIONS (PRN):zolpidem 5 milliGRAM(s) Oral at bedtime PRN Insomnia  zolpidem 5 milliGRAM(s) Oral at bedtime PRN Insomnia      LABS  CBC (11-28 @ 06:00)                              8.8<L>                         6.32    )----------------(  358        --    % Neutrophils, --    % Lymphocytes, ANC: --                                  25.8<L>  CBC (11-27 @ 05:00)                              9.3<L>                         6.19    )----------------(  377        --    % Neutrophils, --    % Lymphocytes, ANC: --                                  28.7<L>    BMP (11-28 @ 06:00)             137     |  100     |  5<L>  		Ca++ --      Ca 8.6                ---------------------------------( 84    		Mg 1.3<L>             4.2     |  23      |  0.69  			Ph 3.5     BMP (11-27 @ 05:00)             138     |  99      |  4<L>  		Ca++ --      Ca 8.7                ---------------------------------( 96    		Mg 1.6                4.0     |  26      |  0.70  			Ph 2.4<L>

## 2017-11-28 NOTE — DISCHARGE NOTE ADULT - PATIENT PORTAL LINK FT
“You can access the Follow[employer] Patient Portal, offered by [facility], by registering with the following website: [email or web address]”

## 2017-11-28 NOTE — DISCHARGE NOTE ADULT - PLAN OF CARE
Recovery from SBO, continue advancing diet Patient was advised to follow-up with their surgeon in 1-2 weeks and call the office with any questions or concerns. Continue a low residue diet.  Monitor stool output.  It should thicken up as time goes on.  Keep hydrated with water.  If ostomy output increases greater than 1liter in 24 hours, call Dr. Zhou's office.  If you have any further emesis or inability to tolerate food or if ostomy stops functioning, call Doctor Winnie's office or go to your nearest emergency room.  Follow-up with your surgeon (Dr. Zhou) in 1-2 weeks and call the office with any questions or concerns.  Please follow up with your Primary Care Doctor/Cardiologist within 1 week to review recent hospitalization, medical problems and medications especially your blood pressure.  Restart home Xarelto 11/29 (tomorrow)  You received two doses of therapeutic Lovenox prior to leaving the hospital on 11/28 which substitutes for the Xarelto. Recovery from SBO, continue diet Continue a low residue diet.  Monitor stool output.  It should thicken up as time goes on.  Keep hydrated with water.  If ostomy output increases greater than 1liter in 24 hours, call Dr. Zhou's office.  If you have any further emesis or inability to tolerate food or if ostomy stops functioning, call Doctor Winnie's office or go to your nearest emergency room.  Follow-up with your surgeon (Dr. Zhou) in 1-2 weeks and call the office with any questions or concerns.  Please follow up with your Primary Care Doctor/Cardiologist within 1 week to review recent hospitalization, medical problems and medications especially your blood pressure.  Restart home Xarelto 11/29 (tomorrow)  You received two doses of therapeutic Lovenox prior to leaving the hospital on 11/28 which substitutes for the Xarelto.  Please follow up with your Urologist Dr. Rubén Ramirez about your previous acute on chronic UTI being treated prior to hospitalization.  Please call today for instructions and an appointment within next few days. Continue a low residue diet.  Monitor stool output.  It should thicken up as time goes on.  Keep hydrated with water.  If ostomy output increases greater than 1liter in 24 hours, call Dr. Zhou's office.  If you have any further emesis or inability to tolerate food or if ostomy stops functioning, call Doctor Winnie's office or go to your nearest emergency room.  Follow-up with your surgeon (Dr. Zhou) in 1-2 weeks and call the office with any questions or concerns.  Please follow up with your Primary Care Doctor/Cardiologist within 1 week to review recent hospitalization, medical problems and medications especially your blood pressure.  Restart home Xarelto 11/29 (tomorrow)  You received two doses of therapeutic Lovenox prior to leaving the hospital on 11/28 which substitutes for the Xarelto.  Per your Urologist Dr. Rubén Ramirez do not take any further Levaquin or Doxycycline at this time.  Start your Methenimine and follow up in his office in 2 weeks for a repeat urine culture which has already been ordered.  You may go to the office M-F any time between 8am and 5pm.  After culture is collected, call office in 3 days for results.

## 2017-11-28 NOTE — PROGRESS NOTE ADULT - ASSESSMENT
67 year old woman with ovarian cancer on chemotherapy s/p multiple abdominal surgeries including tumor debulking c/b colon perforation, sigmoid resection and hartmanns, s/p attempted reversal presented with recurrent SBO. Today is doing well and GI fxn is great with gas and stool in ostomy bag.     - CLD Adv to LRD  - Strict I/O monitoring  - D/C IV abx  - Toradol for breakthrough abdominal pain and morphine if not effective  - f/u urine culture: no growth  - Serial abdominal exams  - Therapeutic Lovenox for PE  - Home today pending good GI fxn

## 2017-11-29 LAB
BACTERIA UR CULT: SIGNIFICANT CHANGE UP
SPECIMEN SOURCE: SIGNIFICANT CHANGE UP

## 2017-12-19 ENCOUNTER — APPOINTMENT (OUTPATIENT)
Dept: UROLOGY | Facility: CLINIC | Age: 67
End: 2017-12-19
Payer: MEDICARE

## 2017-12-19 PROCEDURE — 99214 OFFICE O/P EST MOD 30 MIN: CPT

## 2017-12-27 LAB — BACTERIA UR CULT: NORMAL

## 2018-01-10 ENCOUNTER — APPOINTMENT (OUTPATIENT)
Dept: SURGERY | Facility: CLINIC | Age: 68
End: 2018-01-10
Payer: MEDICARE

## 2018-01-10 VITALS
HEIGHT: 64 IN | HEART RATE: 85 BPM | BODY MASS INDEX: 17.75 KG/M2 | SYSTOLIC BLOOD PRESSURE: 110 MMHG | DIASTOLIC BLOOD PRESSURE: 73 MMHG | WEIGHT: 104 LBS | TEMPERATURE: 97.7 F

## 2018-01-10 PROCEDURE — 99211 OFF/OP EST MAY X REQ PHY/QHP: CPT

## 2018-01-18 LAB
APPEARANCE: ABNORMAL
BACTERIA: ABNORMAL
BILIRUBIN URINE: ABNORMAL
BLOOD URINE: ABNORMAL
COLOR: ABNORMAL
GLUCOSE QUALITATIVE U: NEGATIVE MG/DL
HYALINE CASTS: 2 /LPF
KETONES URINE: NEGATIVE
LEUKOCYTE ESTERASE URINE: ABNORMAL
MICROSCOPIC-UA: NORMAL
NITRITE URINE: POSITIVE
PH URINE: 5.5
PROTEIN URINE: 300 MG/DL
RED BLOOD CELLS URINE: 12 /HPF
SPECIFIC GRAVITY URINE: 1.03
SQUAMOUS EPITHELIAL CELLS: 0 /HPF
UROBILINOGEN URINE: 1 MG/DL
WHITE BLOOD CELLS URINE: 218 /HPF

## 2018-01-23 ENCOUNTER — APPOINTMENT (OUTPATIENT)
Dept: UROLOGY | Facility: CLINIC | Age: 68
End: 2018-01-23
Payer: MEDICARE

## 2018-01-23 DIAGNOSIS — Z87.440 PERSONAL HISTORY OF URINARY (TRACT) INFECTIONS: ICD-10-CM

## 2018-01-23 DIAGNOSIS — R39.15 URGENCY OF URINATION: ICD-10-CM

## 2018-01-23 PROCEDURE — 99214 OFFICE O/P EST MOD 30 MIN: CPT

## 2018-01-25 PROBLEM — R39.15 URINARY URGENCY: Status: ACTIVE | Noted: 2017-11-13

## 2018-01-25 LAB — BACTERIA UR CULT: ABNORMAL

## 2018-01-29 ENCOUNTER — APPOINTMENT (OUTPATIENT)
Dept: GYNECOLOGIC ONCOLOGY | Facility: CLINIC | Age: 68
End: 2018-01-29
Payer: MEDICARE

## 2018-01-29 VITALS
BODY MASS INDEX: 16.73 KG/M2 | WEIGHT: 98 LBS | HEIGHT: 64 IN | DIASTOLIC BLOOD PRESSURE: 60 MMHG | SYSTOLIC BLOOD PRESSURE: 100 MMHG

## 2018-01-29 PROCEDURE — 99214 OFFICE O/P EST MOD 30 MIN: CPT

## 2018-01-29 RX ORDER — ONDANSETRON HYDROCHLORIDE 8 MG/1
8 TABLET, FILM COATED ORAL
Refills: 0 | Status: ACTIVE | COMMUNITY

## 2018-01-29 RX ORDER — CLONAZEPAM 0.5 MG/1
0.5 TABLET ORAL
Refills: 0 | Status: ACTIVE | COMMUNITY

## 2018-01-29 RX ORDER — CIPROFLOXACIN HYDROCHLORIDE 500 MG/1
500 TABLET, FILM COATED ORAL
Qty: 28 | Refills: 2 | Status: DISCONTINUED | COMMUNITY
Start: 2017-09-22 | End: 2018-01-29

## 2018-01-31 ENCOUNTER — RX RENEWAL (OUTPATIENT)
Age: 68
End: 2018-01-31

## 2018-03-29 ENCOUNTER — RX RENEWAL (OUTPATIENT)
Age: 68
End: 2018-03-29

## 2018-04-30 ENCOUNTER — APPOINTMENT (OUTPATIENT)
Dept: GYNECOLOGIC ONCOLOGY | Facility: CLINIC | Age: 68
End: 2018-04-30
Payer: MEDICARE

## 2018-04-30 VITALS
HEIGHT: 64 IN | WEIGHT: 102 LBS | BODY MASS INDEX: 17.42 KG/M2 | DIASTOLIC BLOOD PRESSURE: 60 MMHG | SYSTOLIC BLOOD PRESSURE: 118 MMHG

## 2018-04-30 DIAGNOSIS — R92.8 OTHER ABNORMAL AND INCONCLUSIVE FINDINGS ON DIAGNOSTIC IMAGING OF BREAST: ICD-10-CM

## 2018-04-30 DIAGNOSIS — Z80.42 FAMILY HISTORY OF MALIGNANT NEOPLASM OF PROSTATE: ICD-10-CM

## 2018-04-30 DIAGNOSIS — Z80.51 FAMILY HISTORY OF MALIGNANT NEOPLASM OF KIDNEY: ICD-10-CM

## 2018-04-30 DIAGNOSIS — Z80.3 FAMILY HISTORY OF MALIGNANT NEOPLASM OF BREAST: ICD-10-CM

## 2018-04-30 PROCEDURE — 99214 OFFICE O/P EST MOD 30 MIN: CPT

## 2018-04-30 RX ORDER — OXYBUTYNIN CHLORIDE 10 MG/1
10 TABLET, EXTENDED RELEASE ORAL DAILY
Qty: 30 | Refills: 3 | Status: DISCONTINUED | COMMUNITY
Start: 2017-12-19 | End: 2018-04-30

## 2018-04-30 RX ORDER — METHENAMINE HIPPURATE 1 G/1
1 TABLET ORAL DAILY
Qty: 90 | Refills: 0 | Status: DISCONTINUED | COMMUNITY
Start: 2017-11-10 | End: 2018-04-30

## 2018-04-30 RX ORDER — CIPROFLOXACIN HYDROCHLORIDE 500 MG/1
500 TABLET, FILM COATED ORAL
Qty: 14 | Refills: 0 | Status: DISCONTINUED | COMMUNITY
Start: 2018-03-20 | End: 2018-04-30

## 2018-04-30 RX ORDER — RIVAROXABAN 2.5 MG/1
TABLET, FILM COATED ORAL
Refills: 0 | Status: DISCONTINUED | COMMUNITY
End: 2018-04-30

## 2018-05-06 PROBLEM — Z80.42 FAMILY HISTORY OF MALIGNANT NEOPLASM OF PROSTATE: Status: ACTIVE | Noted: 2017-11-07

## 2018-05-06 PROBLEM — R92.8 ABNORMAL FINDINGS ON DIAGNOSTIC IMAGING OF BREAST: Status: ACTIVE | Noted: 2018-05-06

## 2018-05-06 PROBLEM — Z80.51 FAMILY HISTORY OF MALIGNANT NEOPLASM OF KIDNEY: Status: ACTIVE | Noted: 2017-11-07

## 2018-05-06 PROBLEM — Z80.3 FAMILY HISTORY OF MALIGNANT NEOPLASM OF BREAST: Status: ACTIVE | Noted: 2017-11-07

## 2018-05-20 ENCOUNTER — INPATIENT (INPATIENT)
Facility: HOSPITAL | Age: 68
LOS: 3 days | Discharge: NURSING, REHAB OR LONG-TERM CARE | End: 2018-05-24
Attending: HOSPITALIST | Admitting: HOSPITALIST
Payer: MEDICARE

## 2018-05-20 ENCOUNTER — RESULT REVIEW (OUTPATIENT)
Age: 68
End: 2018-05-20

## 2018-05-20 VITALS
DIASTOLIC BLOOD PRESSURE: 76 MMHG | HEART RATE: 78 BPM | WEIGHT: 102.07 LBS | HEIGHT: 64 IN | RESPIRATION RATE: 18 BRPM | SYSTOLIC BLOOD PRESSURE: 157 MMHG | TEMPERATURE: 98 F

## 2018-05-20 DIAGNOSIS — Z98.890 OTHER SPECIFIED POSTPROCEDURAL STATES: Chronic | ICD-10-CM

## 2018-05-20 DIAGNOSIS — N85.9 NONINFLAMMATORY DISORDER OF UTERUS, UNSPECIFIED: Chronic | ICD-10-CM

## 2018-05-20 DIAGNOSIS — Z90.710 ACQUIRED ABSENCE OF BOTH CERVIX AND UTERUS: Chronic | ICD-10-CM

## 2018-05-20 DIAGNOSIS — Z90.49 ACQUIRED ABSENCE OF OTHER SPECIFIED PARTS OF DIGESTIVE TRACT: Chronic | ICD-10-CM

## 2018-05-20 DIAGNOSIS — Z93.3 COLOSTOMY STATUS: Chronic | ICD-10-CM

## 2018-05-20 LAB
ALBUMIN SERPL ELPH-MCNC: 2.9 G/DL — LOW (ref 3.3–5)
ALP SERPL-CCNC: 126 U/L — HIGH (ref 40–120)
ALT FLD-CCNC: 39 U/L — SIGNIFICANT CHANGE UP (ref 12–78)
ANION GAP SERPL CALC-SCNC: 11 MMOL/L — SIGNIFICANT CHANGE UP (ref 5–17)
ANION GAP SERPL CALC-SCNC: 9 MMOL/L — SIGNIFICANT CHANGE UP (ref 5–17)
APPEARANCE UR: (no result)
APTT BLD: 26.7 SEC — LOW (ref 27.5–37.4)
AST SERPL-CCNC: 28 U/L — SIGNIFICANT CHANGE UP (ref 15–37)
BACTERIA # UR AUTO: (no result)
BASOPHILS # BLD AUTO: 0 K/UL — SIGNIFICANT CHANGE UP (ref 0–0.2)
BASOPHILS NFR BLD AUTO: 0 % — SIGNIFICANT CHANGE UP (ref 0–2)
BILIRUB SERPL-MCNC: 0.2 MG/DL — SIGNIFICANT CHANGE UP (ref 0.2–1.2)
BILIRUB UR-MCNC: NEGATIVE — SIGNIFICANT CHANGE UP
BLD GP AB SCN SERPL QL: SIGNIFICANT CHANGE UP
BUN SERPL-MCNC: 26 MG/DL — HIGH (ref 7–23)
BUN SERPL-MCNC: 40 MG/DL — HIGH (ref 7–23)
CALCIUM SERPL-MCNC: 8.1 MG/DL — LOW (ref 8.5–10.1)
CALCIUM SERPL-MCNC: 9.3 MG/DL — SIGNIFICANT CHANGE UP (ref 8.5–10.1)
CHLORIDE SERPL-SCNC: 109 MMOL/L — HIGH (ref 96–108)
CHLORIDE SERPL-SCNC: 110 MMOL/L — HIGH (ref 96–108)
CK SERPL-CCNC: 50 U/L — SIGNIFICANT CHANGE UP (ref 26–192)
CO2 SERPL-SCNC: 18 MMOL/L — LOW (ref 22–31)
CO2 SERPL-SCNC: 22 MMOL/L — SIGNIFICANT CHANGE UP (ref 22–31)
COLOR SPEC: YELLOW — SIGNIFICANT CHANGE UP
CREAT SERPL-MCNC: 0.85 MG/DL — SIGNIFICANT CHANGE UP (ref 0.5–1.3)
CREAT SERPL-MCNC: 1.22 MG/DL — SIGNIFICANT CHANGE UP (ref 0.5–1.3)
DIFF PNL FLD: (no result)
EOSINOPHIL # BLD AUTO: 0 K/UL — SIGNIFICANT CHANGE UP (ref 0–0.5)
EOSINOPHIL NFR BLD AUTO: 0 % — SIGNIFICANT CHANGE UP (ref 0–6)
GLUCOSE SERPL-MCNC: 109 MG/DL — HIGH (ref 70–99)
GLUCOSE SERPL-MCNC: 124 MG/DL — HIGH (ref 70–99)
GLUCOSE UR QL: NEGATIVE MG/DL — SIGNIFICANT CHANGE UP
HCT VFR BLD CALC: 27.8 % — LOW (ref 34.5–45)
HCT VFR BLD CALC: 31.5 % — LOW (ref 34.5–45)
HGB BLD-MCNC: 8.7 G/DL — LOW (ref 11.5–15.5)
HGB BLD-MCNC: 9.9 G/DL — LOW (ref 11.5–15.5)
INR BLD: 1.1 RATIO — SIGNIFICANT CHANGE UP (ref 0.88–1.16)
KETONES UR-MCNC: NEGATIVE — SIGNIFICANT CHANGE UP
LEUKOCYTE ESTERASE UR-ACNC: (no result)
LYMPHOCYTES # BLD AUTO: 14 % — SIGNIFICANT CHANGE UP (ref 13–44)
LYMPHOCYTES # BLD AUTO: 2.59 K/UL — SIGNIFICANT CHANGE UP (ref 1–3.3)
MCHC RBC-ENTMCNC: 30.8 PG — SIGNIFICANT CHANGE UP (ref 27–34)
MCHC RBC-ENTMCNC: 31.3 GM/DL — LOW (ref 32–36)
MCHC RBC-ENTMCNC: 31.4 GM/DL — LOW (ref 32–36)
MCHC RBC-ENTMCNC: 31.9 PG — SIGNIFICANT CHANGE UP (ref 27–34)
MCV RBC AUTO: 101.8 FL — HIGH (ref 80–100)
MCV RBC AUTO: 98.1 FL — SIGNIFICANT CHANGE UP (ref 80–100)
MONOCYTES # BLD AUTO: 1.11 K/UL — HIGH (ref 0–0.9)
MONOCYTES NFR BLD AUTO: 6 % — SIGNIFICANT CHANGE UP (ref 2–14)
NEUTROPHILS # BLD AUTO: 14.81 K/UL — HIGH (ref 1.8–7.4)
NEUTROPHILS NFR BLD AUTO: 74 % — SIGNIFICANT CHANGE UP (ref 43–77)
NEUTS BAND # BLD: 6 % — SIGNIFICANT CHANGE UP (ref 0–8)
NITRITE UR-MCNC: NEGATIVE — SIGNIFICANT CHANGE UP
NRBC # BLD: 0 /100 WBCS — SIGNIFICANT CHANGE UP (ref 0–0)
NRBC # BLD: 0 /100 — SIGNIFICANT CHANGE UP (ref 0–0)
NRBC # BLD: SIGNIFICANT CHANGE UP /100 WBCS (ref 0–0)
PH UR: 5 — SIGNIFICANT CHANGE UP (ref 5–8)
PLAT MORPH BLD: NORMAL — SIGNIFICANT CHANGE UP
PLATELET # BLD AUTO: 220 K/UL — SIGNIFICANT CHANGE UP (ref 150–400)
PLATELET # BLD AUTO: 252 K/UL — SIGNIFICANT CHANGE UP (ref 150–400)
POTASSIUM SERPL-MCNC: 4.3 MMOL/L — SIGNIFICANT CHANGE UP (ref 3.5–5.3)
POTASSIUM SERPL-MCNC: 4.3 MMOL/L — SIGNIFICANT CHANGE UP (ref 3.5–5.3)
POTASSIUM SERPL-SCNC: 4.3 MMOL/L — SIGNIFICANT CHANGE UP (ref 3.5–5.3)
POTASSIUM SERPL-SCNC: 4.3 MMOL/L — SIGNIFICANT CHANGE UP (ref 3.5–5.3)
PROT SERPL-MCNC: 7.6 GM/DL — SIGNIFICANT CHANGE UP (ref 6–8.3)
PROT UR-MCNC: 100 MG/DL
PROTHROM AB SERPL-ACNC: 11.9 SEC — SIGNIFICANT CHANGE UP (ref 9.8–12.7)
RBC # BLD: 2.73 M/UL — LOW (ref 3.8–5.2)
RBC # BLD: 3.21 M/UL — LOW (ref 3.8–5.2)
RBC # FLD: 19.9 % — HIGH (ref 10.3–14.5)
RBC # FLD: 19.9 % — HIGH (ref 10.3–14.5)
RBC BLD AUTO: NORMAL — SIGNIFICANT CHANGE UP
RBC CASTS # UR COMP ASSIST: (no result) /HPF (ref 0–4)
SODIUM SERPL-SCNC: 139 MMOL/L — SIGNIFICANT CHANGE UP (ref 135–145)
SODIUM SERPL-SCNC: 140 MMOL/L — SIGNIFICANT CHANGE UP (ref 135–145)
SP GR SPEC: 1.01 — SIGNIFICANT CHANGE UP (ref 1.01–1.02)
TROPONIN I SERPL-MCNC: <0.015 NG/ML — SIGNIFICANT CHANGE UP (ref 0.01–0.04)
TYPE + AB SCN PNL BLD: SIGNIFICANT CHANGE UP
UROBILINOGEN FLD QL: NEGATIVE MG/DL — SIGNIFICANT CHANGE UP
WBC # BLD: 18.51 K/UL — HIGH (ref 3.8–10.5)
WBC # BLD: 28.87 K/UL — HIGH (ref 3.8–10.5)
WBC # FLD AUTO: 18.51 K/UL — HIGH (ref 3.8–10.5)
WBC # FLD AUTO: 28.87 K/UL — HIGH (ref 3.8–10.5)
WBC UR QL: SIGNIFICANT CHANGE UP /HPF (ref 0–5)

## 2018-05-20 PROCEDURE — 72125 CT NECK SPINE W/O DYE: CPT | Mod: 26

## 2018-05-20 PROCEDURE — 88311 DECALCIFY TISSUE: CPT | Mod: 26

## 2018-05-20 PROCEDURE — 73562 X-RAY EXAM OF KNEE 3: CPT | Mod: 26,RT

## 2018-05-20 PROCEDURE — 70450 CT HEAD/BRAIN W/O DYE: CPT | Mod: 26

## 2018-05-20 PROCEDURE — 73502 X-RAY EXAM HIP UNI 2-3 VIEWS: CPT | Mod: 26

## 2018-05-20 PROCEDURE — 71045 X-RAY EXAM CHEST 1 VIEW: CPT | Mod: 26

## 2018-05-20 PROCEDURE — 73552 X-RAY EXAM OF FEMUR 2/>: CPT | Mod: 26

## 2018-05-20 PROCEDURE — 73060 X-RAY EXAM OF HUMERUS: CPT | Mod: 26,RT

## 2018-05-20 PROCEDURE — 73501 X-RAY EXAM HIP UNI 1 VIEW: CPT | Mod: 26,59,RT

## 2018-05-20 PROCEDURE — 73030 X-RAY EXAM OF SHOULDER: CPT | Mod: 26,RT

## 2018-05-20 PROCEDURE — 73020 X-RAY EXAM OF SHOULDER: CPT | Mod: 26,59,RT

## 2018-05-20 PROCEDURE — 88305 TISSUE EXAM BY PATHOLOGIST: CPT | Mod: 26

## 2018-05-20 PROCEDURE — 93010 ELECTROCARDIOGRAM REPORT: CPT

## 2018-05-20 PROCEDURE — 99285 EMERGENCY DEPT VISIT HI MDM: CPT

## 2018-05-20 RX ORDER — POLYETHYLENE GLYCOL 3350 17 G/17G
17 POWDER, FOR SOLUTION ORAL DAILY
Qty: 0 | Refills: 0 | Status: DISCONTINUED | OUTPATIENT
Start: 2018-05-20 | End: 2018-05-24

## 2018-05-20 RX ORDER — PANTOPRAZOLE SODIUM 20 MG/1
40 TABLET, DELAYED RELEASE ORAL DAILY
Qty: 0 | Refills: 0 | Status: DISCONTINUED | OUTPATIENT
Start: 2018-05-20 | End: 2018-05-24

## 2018-05-20 RX ORDER — ACETAMINOPHEN 500 MG
1000 TABLET ORAL ONCE
Qty: 0 | Refills: 0 | Status: COMPLETED | OUTPATIENT
Start: 2018-05-20 | End: 2018-05-20

## 2018-05-20 RX ORDER — SODIUM CHLORIDE 9 MG/ML
1000 INJECTION INTRAMUSCULAR; INTRAVENOUS; SUBCUTANEOUS
Qty: 0 | Refills: 0 | Status: DISCONTINUED | OUTPATIENT
Start: 2018-05-20 | End: 2018-05-20

## 2018-05-20 RX ORDER — ONDANSETRON 8 MG/1
4 TABLET, FILM COATED ORAL EVERY 6 HOURS
Qty: 0 | Refills: 0 | Status: DISCONTINUED | OUTPATIENT
Start: 2018-05-20 | End: 2018-05-24

## 2018-05-20 RX ORDER — CLONAZEPAM 1 MG
1 TABLET ORAL
Qty: 0 | Refills: 0 | COMMUNITY

## 2018-05-20 RX ORDER — OXYCODONE HYDROCHLORIDE 5 MG/1
1 TABLET ORAL
Qty: 0 | Refills: 0 | COMMUNITY

## 2018-05-20 RX ORDER — CARVEDILOL PHOSPHATE 80 MG/1
6.25 CAPSULE, EXTENDED RELEASE ORAL EVERY 12 HOURS
Qty: 0 | Refills: 0 | Status: DISCONTINUED | OUTPATIENT
Start: 2018-05-20 | End: 2018-05-20

## 2018-05-20 RX ORDER — ONDANSETRON 8 MG/1
4 TABLET, FILM COATED ORAL EVERY 6 HOURS
Qty: 0 | Refills: 0 | Status: DISCONTINUED | OUTPATIENT
Start: 2018-05-20 | End: 2018-05-22

## 2018-05-20 RX ORDER — CEFTRIAXONE 500 MG/1
1 INJECTION, POWDER, FOR SOLUTION INTRAMUSCULAR; INTRAVENOUS EVERY 24 HOURS
Qty: 0 | Refills: 0 | Status: DISCONTINUED | OUTPATIENT
Start: 2018-05-20 | End: 2018-05-20

## 2018-05-20 RX ORDER — SENNA PLUS 8.6 MG/1
2 TABLET ORAL AT BEDTIME
Qty: 0 | Refills: 0 | Status: DISCONTINUED | OUTPATIENT
Start: 2018-05-20 | End: 2018-05-24

## 2018-05-20 RX ORDER — ONDANSETRON 8 MG/1
4 TABLET, FILM COATED ORAL EVERY 6 HOURS
Qty: 0 | Refills: 0 | Status: DISCONTINUED | OUTPATIENT
Start: 2018-05-20 | End: 2018-05-20

## 2018-05-20 RX ORDER — PANTOPRAZOLE SODIUM 20 MG/1
1 TABLET, DELAYED RELEASE ORAL
Qty: 0 | Refills: 0 | COMMUNITY

## 2018-05-20 RX ORDER — SIMVASTATIN 20 MG/1
10 TABLET, FILM COATED ORAL AT BEDTIME
Qty: 0 | Refills: 0 | Status: DISCONTINUED | OUTPATIENT
Start: 2018-05-20 | End: 2018-05-20

## 2018-05-20 RX ORDER — HYDROMORPHONE HYDROCHLORIDE 2 MG/ML
0.5 INJECTION INTRAMUSCULAR; INTRAVENOUS; SUBCUTANEOUS
Qty: 0 | Refills: 0 | Status: DISCONTINUED | OUTPATIENT
Start: 2018-05-20 | End: 2018-05-20

## 2018-05-20 RX ORDER — DIPHENHYDRAMINE HCL 50 MG
25 CAPSULE ORAL AT BEDTIME
Qty: 0 | Refills: 0 | Status: DISCONTINUED | OUTPATIENT
Start: 2018-05-20 | End: 2018-05-24

## 2018-05-20 RX ORDER — MORPHINE SULFATE 50 MG/1
2 CAPSULE, EXTENDED RELEASE ORAL
Qty: 0 | Refills: 0 | Status: DISCONTINUED | OUTPATIENT
Start: 2018-05-20 | End: 2018-05-20

## 2018-05-20 RX ORDER — CEFTRIAXONE 500 MG/1
1000 INJECTION, POWDER, FOR SOLUTION INTRAMUSCULAR; INTRAVENOUS EVERY 24 HOURS
Qty: 0 | Refills: 0 | Status: DISCONTINUED | OUTPATIENT
Start: 2018-05-20 | End: 2018-05-20

## 2018-05-20 RX ORDER — ENOXAPARIN SODIUM 100 MG/ML
40 INJECTION SUBCUTANEOUS DAILY
Qty: 0 | Refills: 0 | Status: DISCONTINUED | OUTPATIENT
Start: 2018-05-21 | End: 2018-05-21

## 2018-05-20 RX ORDER — ESTRADIOL 4 UG/1
0 INSERT VAGINAL
Qty: 0 | Refills: 0 | COMMUNITY

## 2018-05-20 RX ORDER — RIVAROXABAN 15 MG-20MG
1 KIT ORAL
Qty: 0 | Refills: 0 | COMMUNITY

## 2018-05-20 RX ORDER — FENTANYL CITRATE 50 UG/ML
50 INJECTION INTRAVENOUS
Qty: 0 | Refills: 0 | Status: DISCONTINUED | OUTPATIENT
Start: 2018-05-20 | End: 2018-05-20

## 2018-05-20 RX ORDER — HYDROMORPHONE HYDROCHLORIDE 2 MG/ML
0.5 INJECTION INTRAMUSCULAR; INTRAVENOUS; SUBCUTANEOUS ONCE
Qty: 0 | Refills: 0 | Status: DISCONTINUED | OUTPATIENT
Start: 2018-05-20 | End: 2018-05-20

## 2018-05-20 RX ORDER — NALOXONE HYDROCHLORIDE 4 MG/.1ML
0.1 SPRAY NASAL
Qty: 0 | Refills: 0 | Status: DISCONTINUED | OUTPATIENT
Start: 2018-05-20 | End: 2018-05-22

## 2018-05-20 RX ORDER — ONDANSETRON 8 MG/1
4 TABLET, FILM COATED ORAL ONCE
Qty: 0 | Refills: 0 | Status: COMPLETED | OUTPATIENT
Start: 2018-05-20 | End: 2018-05-20

## 2018-05-20 RX ORDER — ACETAMINOPHEN 500 MG
650 TABLET ORAL EVERY 6 HOURS
Qty: 0 | Refills: 0 | Status: DISCONTINUED | OUTPATIENT
Start: 2018-05-20 | End: 2018-05-24

## 2018-05-20 RX ORDER — CARVEDILOL PHOSPHATE 80 MG/1
1 CAPSULE, EXTENDED RELEASE ORAL
Qty: 0 | Refills: 0 | COMMUNITY

## 2018-05-20 RX ORDER — PANTOPRAZOLE SODIUM 20 MG/1
40 TABLET, DELAYED RELEASE ORAL
Qty: 0 | Refills: 0 | Status: DISCONTINUED | OUTPATIENT
Start: 2018-05-20 | End: 2018-05-20

## 2018-05-20 RX ORDER — HYDROMORPHONE HYDROCHLORIDE 2 MG/ML
30 INJECTION INTRAMUSCULAR; INTRAVENOUS; SUBCUTANEOUS
Qty: 0 | Refills: 0 | Status: DISCONTINUED | OUTPATIENT
Start: 2018-05-20 | End: 2018-05-22

## 2018-05-20 RX ORDER — LIDOCAINE 4 G/100G
5 CREAM TOPICAL
Qty: 0 | Refills: 0 | COMMUNITY

## 2018-05-20 RX ORDER — HYDROMORPHONE HYDROCHLORIDE 2 MG/ML
0.5 INJECTION INTRAMUSCULAR; INTRAVENOUS; SUBCUTANEOUS
Qty: 0 | Refills: 0 | Status: DISCONTINUED | OUTPATIENT
Start: 2018-05-20 | End: 2018-05-22

## 2018-05-20 RX ORDER — CEFAZOLIN SODIUM 1 G
2000 VIAL (EA) INJECTION EVERY 8 HOURS
Qty: 0 | Refills: 0 | Status: COMPLETED | OUTPATIENT
Start: 2018-05-20 | End: 2018-05-21

## 2018-05-20 RX ORDER — FLAVOXATE HCL 100 MG
1 TABLET ORAL
Qty: 0 | Refills: 0 | COMMUNITY

## 2018-05-20 RX ORDER — SODIUM CHLORIDE 9 MG/ML
1000 INJECTION, SOLUTION INTRAVENOUS
Qty: 0 | Refills: 0 | Status: DISCONTINUED | OUTPATIENT
Start: 2018-05-20 | End: 2018-05-20

## 2018-05-20 RX ORDER — DOCUSATE SODIUM 100 MG
100 CAPSULE ORAL THREE TIMES A DAY
Qty: 0 | Refills: 0 | Status: DISCONTINUED | OUTPATIENT
Start: 2018-05-20 | End: 2018-05-24

## 2018-05-20 RX ORDER — ZOLPIDEM TARTRATE 10 MG/1
1 TABLET ORAL
Qty: 0 | Refills: 0 | COMMUNITY

## 2018-05-20 RX ORDER — HEPARIN SODIUM 5000 [USP'U]/ML
5000 INJECTION INTRAVENOUS; SUBCUTANEOUS EVERY 8 HOURS
Qty: 0 | Refills: 0 | Status: DISCONTINUED | OUTPATIENT
Start: 2018-05-20 | End: 2018-05-20

## 2018-05-20 RX ORDER — SODIUM CHLORIDE 9 MG/ML
1000 INJECTION INTRAMUSCULAR; INTRAVENOUS; SUBCUTANEOUS ONCE
Qty: 0 | Refills: 0 | Status: COMPLETED | OUTPATIENT
Start: 2018-05-20 | End: 2018-05-20

## 2018-05-20 RX ADMIN — HYDROMORPHONE HYDROCHLORIDE 0.5 MILLIGRAM(S): 2 INJECTION INTRAMUSCULAR; INTRAVENOUS; SUBCUTANEOUS at 08:10

## 2018-05-20 RX ADMIN — HYDROMORPHONE HYDROCHLORIDE 0.5 MILLIGRAM(S): 2 INJECTION INTRAMUSCULAR; INTRAVENOUS; SUBCUTANEOUS at 06:21

## 2018-05-20 RX ADMIN — HYDROMORPHONE HYDROCHLORIDE 0.5 MILLIGRAM(S): 2 INJECTION INTRAMUSCULAR; INTRAVENOUS; SUBCUTANEOUS at 17:50

## 2018-05-20 RX ADMIN — HYDROMORPHONE HYDROCHLORIDE 0.5 MILLIGRAM(S): 2 INJECTION INTRAMUSCULAR; INTRAVENOUS; SUBCUTANEOUS at 13:52

## 2018-05-20 RX ADMIN — SODIUM CHLORIDE 1000 MILLILITER(S): 9 INJECTION INTRAMUSCULAR; INTRAVENOUS; SUBCUTANEOUS at 05:54

## 2018-05-20 RX ADMIN — MORPHINE SULFATE 2 MILLIGRAM(S): 50 CAPSULE, EXTENDED RELEASE ORAL at 11:21

## 2018-05-20 RX ADMIN — SODIUM CHLORIDE 75 MILLILITER(S): 9 INJECTION, SOLUTION INTRAVENOUS at 17:55

## 2018-05-20 RX ADMIN — HYDROMORPHONE HYDROCHLORIDE 0.5 MILLIGRAM(S): 2 INJECTION INTRAMUSCULAR; INTRAVENOUS; SUBCUTANEOUS at 05:40

## 2018-05-20 RX ADMIN — HYDROMORPHONE HYDROCHLORIDE 0.5 MILLIGRAM(S): 2 INJECTION INTRAMUSCULAR; INTRAVENOUS; SUBCUTANEOUS at 07:20

## 2018-05-20 RX ADMIN — HYDROMORPHONE HYDROCHLORIDE 0.5 MILLIGRAM(S): 2 INJECTION INTRAMUSCULAR; INTRAVENOUS; SUBCUTANEOUS at 18:00

## 2018-05-20 RX ADMIN — Medication 400 MILLIGRAM(S): at 17:54

## 2018-05-20 RX ADMIN — ONDANSETRON 4 MILLIGRAM(S): 8 TABLET, FILM COATED ORAL at 05:40

## 2018-05-20 RX ADMIN — CEFTRIAXONE 1000 MILLIGRAM(S): 500 INJECTION, POWDER, FOR SOLUTION INTRAMUSCULAR; INTRAVENOUS at 14:20

## 2018-05-20 RX ADMIN — HYDROMORPHONE HYDROCHLORIDE 0.5 MILLIGRAM(S): 2 INJECTION INTRAMUSCULAR; INTRAVENOUS; SUBCUTANEOUS at 05:55

## 2018-05-20 RX ADMIN — HYDROMORPHONE HYDROCHLORIDE 0.5 MILLIGRAM(S): 2 INJECTION INTRAMUSCULAR; INTRAVENOUS; SUBCUTANEOUS at 10:13

## 2018-05-20 RX ADMIN — Medication 1000 MILLIGRAM(S): at 07:19

## 2018-05-20 RX ADMIN — HYDROMORPHONE HYDROCHLORIDE 30 MILLILITER(S): 2 INJECTION INTRAMUSCULAR; INTRAVENOUS; SUBCUTANEOUS at 17:47

## 2018-05-20 RX ADMIN — Medication 400 MILLIGRAM(S): at 05:40

## 2018-05-20 RX ADMIN — Medication 100 MILLIGRAM(S): at 21:36

## 2018-05-20 RX ADMIN — ONDANSETRON 4 MILLIGRAM(S): 8 TABLET, FILM COATED ORAL at 17:54

## 2018-05-20 NOTE — ED PROVIDER NOTE - MEDICAL DECISION MAKING DETAILS
CTs of head and C spine ordered as well as Xrays of right upper extremity and lower extremity.  Suspect fracture of right shoulder and fracture of right hip. Pt. will need admission if she has a fracture.

## 2018-05-20 NOTE — ED ADULT NURSE NOTE - OBJECTIVE STATEMENT
Pt presents to ER s/p witnessed mechanical fall from standing onto right side. Pt c/o right hip and RUE pain. +2 pulses in b/l upper and lower extremity, good peripheral sensation. Decreased ROM in RUE/RLE, skin intact. Pt reports hitting head during fall, denies LOC/change in vision/blood thinners. AO x 3 oriented to baseline, normal breathing pattern with no difficulty

## 2018-05-20 NOTE — ED ADULT NURSE REASSESSMENT NOTE - NS ED NURSE REASSESS COMMENT FT1
patient continues with pain, medicated with dilaudid, will monitor for effect. vss, awaiting surgery

## 2018-05-20 NOTE — BRIEF OPERATIVE NOTE - PROCEDURE
<<-----Click on this checkbox to enter Procedure Bipolar hemiarthroplasty of hip  05/20/2018    Active  [id number]

## 2018-05-20 NOTE — ED PROVIDER NOTE - CARE PLAN
Principal Discharge DX:	Closed fracture of left hip, initial encounter  Secondary Diagnosis:	Other closed nondisplaced fracture of proximal end of right humerus, initial encounter

## 2018-05-20 NOTE — ED PROVIDER NOTE - MUSCULOSKELETAL, MLM
Spine with kyphosis without midline spinal tenderness.  No sacral tenderness. +tenderness lateral and posterior right hip.  Right LE is shorter and externally rotated.  Normal pedal pulses.  No knee or lower leg tenderness.  No groin hematoma , tenderness or deformity.  +right anterior shoulder tenderness and decreased ROM of right upper extremity.  Normal radial pulse. Unable to abduct right shoulder, flex or extend due to pain.

## 2018-05-20 NOTE — H&P ADULT - ASSESSMENT
68y female with femoral/humerus neck fracture    #  femoral/humerus neck fracture  - currently afebrile, HD stable, comfortable in NAD  - limited ROM  - neurologically intact  - awaiting orthopedic surgical eval  - patient with METs>4, denies CP, SOB hx of CAD/CHF  - medically optimized, CAN proceed with surgery if needed  - pain control  - IVF    # HTN  - Coreg    # HLD  - statin    # DVT ppx, HSQ    # Admit, stable

## 2018-05-20 NOTE — PROGRESS NOTE ADULT - SUBJECTIVE AND OBJECTIVE BOX
Orthopedic surgery requesting 1u PRBC, prior to surgery. Will order.   Patient agreed to transfusion

## 2018-05-20 NOTE — ED ADULT NURSE REASSESSMENT NOTE - NS ED NURSE REASSESS COMMENT FT1
patient continues with hip and shoulder discomfort, dr oleary aware.  awaiting surgery and blood transfusion.

## 2018-05-20 NOTE — H&P ADULT - NSHPPHYSICALEXAM_GEN_ALL_CORE
T(C): 36.7 (05-20-18 @ 04:59), Max: 36.7 (05-20-18 @ 04:59)  HR: 81 (05-20-18 @ 10:12) (78 - 89)  BP: 152/83 (05-20-18 @ 10:12) (139/84 - 157/76)  RR: 18 (05-20-18 @ 04:59) (18 - 18)  SpO2: --  Wt(kg): -    Gen: AAOx3, NAD  HEENT: NCAT, EOMI  Neck: Supple  CV: nml S1S2, RRR  Lungs: CTABL  Abd: Soft, NT, ND, BS+  Ext: No edema  Neuro: Non focal, limited movement of RUE/RLE due to pain

## 2018-05-20 NOTE — H&P ADULT - HISTORY OF PRESENT ILLNESS
Patient is 67yo female with PMhx of Ovarian Ca s/p debulking surgery, s/p ileostomy, on chemo, HTN, HLD, presents s/p fall at home. Recently the patient has had increased urgency/frequency, got up to go to the bathroom, tripped over pice of furniture, fell onto the R side of the body, hitting her head in the process, sustaining R humeral neck fracture, and R femoral neck fracture. Pt denies any cardiac history, denies CP, SOB. METs>4, has never had cardiac issues, previously tolerated surgeries under general anesthesia without any issues.

## 2018-05-20 NOTE — CONSULT NOTE ADULT - SUBJECTIVE AND OBJECTIVE BOX
68yFemale c/o R hip and arm pain s/p mechanical fall. Patient denies head hit or LOC. Patient denies numbness or tingling in the RLE or RUE. Patient denies any other injuries.    PMH:  Perforation of sigmoid colon  Ovarian cancer  Other acute pulmonary embolism  Essential hypertension, hypertension with unspecified goal  Malignant neoplasm of ovary, unspecified laterality    PSH:  History of conization of cervix  Uterus disorder  H/O skin graft  S/P cholecystectomy  H/O abdominal hysterectomy  Status post Reymundo procedure  No significant past surgical history    AH:  ibuprofen (Other)  Lexapro (Unknown)  Wellbutrin (Hives)    Meds: See med rec    T(C): 36.7 (05-20-18 @ 04:59)  HR: 82 (05-20-18 @ 11:21)  BP: 154/78 (05-20-18 @ 11:21)  RR: 18 (05-20-18 @ 04:59)  SpO2: --  Wt(kg): --    PE RLE:  Skin intact, no ecchymosis, SILT L2-S1, +EHL/FHL/TA/Gastroc, +Knee/ankle ROM, hip ROM limited 2/2 pain, DP+, soft compartments, no calf ttp, +log roll.    RUE:  skin clean D&I  +sensation C5-T1  +nerves anterior interosseus/posterior interosseus/radial/median/ulnar/musculocutaneous  +radial pulse  Soft compartments, - calf tenderness      Secondary:  No TTP over bony landmarks, SILT BL, ROM intact BL, distal pulses palpable.    Imaging:  XR demonstrating R hip fracture and R proximal humerus fracture

## 2018-05-20 NOTE — ED PROVIDER NOTE - PROGRESS NOTE DETAILS
Attending elfego Trent (orthopedic resident) for evaluation Attending isaak Trent/elda Lopez for trauma Attending isaak Trent/elda Vasquez for admission

## 2018-05-20 NOTE — CONSULT NOTE ADULT - ASSESSMENT
S/P Fall with Impacted displaced right femoral neck fracture and Comminuted displaced Right humeral neck fracture  -NPO, IVF  - F/U Ortho C/S  - Pain control  -DVT PPX  - Care per Medicine  - Trauma Sx will follow  - Above plan discussed with Dr. Lopez S/P Fall with Impacted displaced right femoral neck fracture and Comminuted impacted displaced right humeral neck fracture extending into the greater tuberosity  -NPO, IVF  - F/U Ortho C/S  - Pain control  -DVT PPX  - Care per Medicine  - Trauma Sx will follow  - Above plan discussed with Dr. Lopez

## 2018-05-20 NOTE — ED PROVIDER NOTE - CONSTITUTIONAL, MLM
normal... Chronically ill elderly woman, awake, alert, oriented to person, place, time/situation.  Appears to be in pain. Moans in pain with any movement.

## 2018-05-20 NOTE — ED PROVIDER NOTE - OBJECTIVE STATEMENT
Pt. 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   states pt. has been having urinary frequency and bladder spasms the past month and is getting up frequently through the night to urinate.  While walking throught the foyer at home she tripped over a piece of furniture and fell onto her right side.  Pt. hit the back of her head, but denies LOC.  +urinary incontinence.  +right shoulder pain and right hip pain.  Pt. denies numbness but is unable to move right shoulder or right leg. She was unable to ambulate and complaining of right hip pain so the  called 911.  Pt. is not taking any anticoagulation at this time. Onc: [facility]

## 2018-05-20 NOTE — CONSULT NOTE ADULT - SUBJECTIVE AND OBJECTIVE BOX
69 yo F with s/p mechanical fall on her back and right side. 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  states pt. has been having urinary frequency and bladder spasms the past month and is getting up frequently through the night to urinate.  While walking through the foyer at home she tripped over a piece of furniture and fell onto her right side.  Pt. hit the back of her head, but denies LOC.  +urinary incontinence.  +right shoulder pain and right hip pain.  Pt. denies numbness but is unable to move right shoulder or right leg. She was unable to ambulate and complaining of right hip pain. Pt taken Xeralto for PE but now not taking for several months.      Past Medical History:  Essential hypertension, hypertension with unspecified goal    Malignant neoplasm of ovary, unspecified laterality    Other acute pulmonary embolism    Ovarian cancer    Perforation of sigmoid colon  s/p Reymundo procedure 4/2016.    Past Surgical History:  H/O abdominal hysterectomy  removal of ovaries, tubes ,omentum,radical debulking  H/O skin graft  to abdominal wound (11/2016)  History of conization of cervix  1984  S/P cholecystectomy  2011  Status post Reymundo procedure  April 2016  Uterus disorder  removed in 2003, due to fibroids.    ROS:  Genitourinary [+]: DIFFICULTY URINATING, urinary frequency; incontinence  Musculoskeletal [+]: JOINT PAIN  ROS STATEMENT: all other ROS negative except as per HPI    Vital Signs Last 24 Hrs  T(C): 36.7 (20 May 2018 04:59), Max: 36.7 (20 May 2018 04:59)  T(F): 98 (20 May 2018 04:59), Max: 98 (20 May 2018 04:59)  HR: 89 (20 May 2018 08:00) (78 - 89)  BP: 139/84 (20 May 2018 08:00) (139/84 - 157/76)    RR: 18 (20 May 2018 04:59) (18 - 18)      Labs:  CBC Full  -  ( 20 May 2018 05:20 )  WBC Count : 18.51 K/uL  Hemoglobin : 8.7 g/dL  Hematocrit : 27.8 %  Platelet Count - Automated : 252 K/uL      20 May 2018 05:20    140    |  109    |  40     ----------------------------<  109    4.3     |  22     |  1.22     Ca    9.3        20 May 2018 05:20    TPro  7.6    /  Alb  2.9    /  TBili  0.2    /  DBili  x      /  AST  28     /  ALT  39     /  AlkPhos  126    20 May 2018 05:20    PT/INR - ( 20 May 2018 05:20 )   PT: 11.9 sec;   INR: 1.10 ratio         PTT - ( 20 May 2018 05:20 )  PTT:26.7 sec    PACS:  CT Head and C-spine Neg  Xray: < from: Xray Hip w/ Pelvis 2 or 3 Views, Right (05.20.18 @ 07:12) >  Impacted displaced right femoral neck fracture    Comminuted displaced Right humeral neck fracture        PE:  CONSTITUTIONAL: Chronically ill elderly woman, awake, alert, oriented to person, place, time/situation.  Appears to be in pain. C/O pain with any movement.  RESPIRATORY: Breath sounds clear and equal bilaterally.  CARDIAC: Normal rate, regular rhythm.  Heart sounds S1, S2 present.  No chest wall tenderness.  Port in right chest, c/d/i  GASTROINTESTINAL: Abdomen soft, non-tender, no guarding; colostomy in lower abdomen draining loose stool.    MUSCULOSKELETAL: No midline of spine tenderness, +tenderness lateral and posterior right hip.  Right LE is shorter and externally rotated.  Normal DP pulses.  No knee or lower leg tenderness.  +right anterior shoulder tenderness and decreased ROM of right upper extremity.  Normal radial pulse.   NEUROLOGICAL: Alert and oriented X 3, no focal deficits, no motor or sensory deficits. 67 yo F with s/p mechanical fall on her back and right side. 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  states pt. has been having urinary frequency and bladder spasms the past month and is getting up frequently through the night to urinate.  While walking through the foyer at home she tripped over a piece of furniture and fell onto her right side.  Pt. hit the back of her head, but denies LOC.  +urinary incontinence.  +right shoulder pain and right hip pain.  Pt. denies numbness but is unable to move right shoulder or right leg. She was unable to ambulate and complaining of right hip pain. Pt taken Xeralto for PE but now not taking for several months.      Past Medical History:  Essential hypertension, hypertension with unspecified goal    Malignant neoplasm of ovary, unspecified laterality    Other acute pulmonary embolism    Ovarian cancer    Perforation of sigmoid colon  s/p Reymundo procedure 4/2016.    Past Surgical History:  H/O abdominal hysterectomy  removal of ovaries, tubes ,omentum,radical debulking  H/O skin graft  to abdominal wound (11/2016)  History of conization of cervix  1984  S/P cholecystectomy  2011  Status post Reymundo procedure  April 2016  Uterus disorder  removed in 2003, due to fibroids.    ROS:  Genitourinary [+]: DIFFICULTY URINATING, urinary frequency; incontinence  Musculoskeletal [+]: JOINT PAIN  ROS STATEMENT: all other ROS negative except as per HPI    Vital Signs Last 24 Hrs  T(C): 36.7 (20 May 2018 04:59), Max: 36.7 (20 May 2018 04:59)  T(F): 98 (20 May 2018 04:59), Max: 98 (20 May 2018 04:59)  HR: 89 (20 May 2018 08:00) (78 - 89)  BP: 139/84 (20 May 2018 08:00) (139/84 - 157/76)    RR: 18 (20 May 2018 04:59) (18 - 18)      Labs:  CBC Full  -  ( 20 May 2018 05:20 )  WBC Count : 18.51 K/uL  Hemoglobin : 8.7 g/dL  Hematocrit : 27.8 %  Platelet Count - Automated : 252 K/uL      20 May 2018 05:20    140    |  109    |  40     ----------------------------<  109    4.3     |  22     |  1.22     Ca    9.3        20 May 2018 05:20    TPro  7.6    /  Alb  2.9    /  TBili  0.2    /  DBili  x      /  AST  28     /  ALT  39     /  AlkPhos  126    20 May 2018 05:20    PT/INR - ( 20 May 2018 05:20 )   PT: 11.9 sec;   INR: 1.10 ratio         PTT - ( 20 May 2018 05:20 )  PTT:26.7 sec    PACS:  CT Head and C-spine Neg  Xray:   Impacted displaced right femoral neck fracture  and  Comminuted impacted displaced right humeral neck fracture extending into the   greater tuberosity.    PE:  CONSTITUTIONAL: Chronically ill elderly woman, awake, alert, oriented to person, place, time/situation.  Appears to be in pain. C/O pain with any movement.  RESPIRATORY: Breath sounds clear and equal bilaterally.  CARDIAC: Normal rate, regular rhythm.  Heart sounds S1, S2 present.  No chest wall tenderness.  Port in right chest, c/d/i  GASTROINTESTINAL: Abdomen soft, non-tender, no guarding; colostomy in lower abdomen draining loose stool.    MUSCULOSKELETAL: No midline of spine tenderness, +tenderness lateral and posterior right hip.  Right LE is shorter and externally rotated.  Normal DP pulses.  No knee or lower leg tenderness.  +right anterior shoulder tenderness and decreased ROM of right upper extremity.  Normal radial pulse.   NEUROLOGICAL: Alert and oriented X 3, no focal deficits, no motor or sensory deficits.

## 2018-05-20 NOTE — ED PROVIDER NOTE - CARDIAC, MLM
Normal rate, regular rhythm.  Heart sounds S1, S2.  No murmurs, rubs or gallops.  No chest wall tenderness.  Port in right chest, c/d/i

## 2018-05-20 NOTE — PROGRESS NOTE ADULT - ASSESSMENT
68F s/p R hip hemiarthroplasty POD 0  Analgesia  DVT ppx  Incentive spirometry  WBAT BL LE  NWB RUE  RUE in sling  P/T  Discharge planning

## 2018-05-20 NOTE — ED ADULT NURSE REASSESSMENT NOTE - NS ED NURSE REASSESS COMMENT FT1
patient transported to OR with transfusion infusing, accompanied by Dr Ritchie. vss at time of transfer. ring and belongings given to .

## 2018-05-20 NOTE — PROGRESS NOTE ADULT - SUBJECTIVE AND OBJECTIVE BOX
Pt seen & examined. Pain controlled.  All vital signs stable (as per nursing flowsheet)  Gen: NAD  RLE:  Dressing clean D&I  +sensation L2-S1  +dorsiflexion/plantarflexion of ankle/hallux  +dorsalis pedis pulse  Soft compartments, - calf tenderness

## 2018-05-20 NOTE — CONSULT NOTE ADULT - ASSESSMENT
68F w/ R proximal humerus and R femoral neck fracture  NPO  IVF  Analgesia  DVT ppx  NWB RUE and RLE  RUE in sling  d/w patient need for surgical intervention for ambulation  Medically optimized for surgical procedure  Plan for definitive surgical fixation today  d/w attending and agrees w/ above

## 2018-05-20 NOTE — H&P ADULT - NSHPREVIEWOFSYSTEMS_GEN_ALL_CORE
(-)Fever, chills, cough, chest pain, sob, headache, dizziness, palpitations, abd pain, n/v/d, leg swelling  (+) R arm, R hip pain

## 2018-05-20 NOTE — H&P ADULT - NSHPLABSRESULTS_GEN_ALL_CORE
-    CARDIAC MARKERS ( 20 May 2018 05:20 )  <0.015 ng/mL / x     / 50 U/L / x     / x                                8.7    18.51 )-----------( 252      ( 20 May 2018 05:20 )             27.8     20 May 2018 05:20    140    |  109    |  40     ----------------------------<  109    4.3     |  22     |  1.22     Ca    9.3        20 May 2018 05:20    TPro  7.6    /  Alb  2.9    /  TBili  0.2    /  DBili  x      /  AST  28     /  ALT  39     /  AlkPhos  126    20 May 2018 05:20    PT/INR - ( 20 May 2018 05:20 )   PT: 11.9 sec;   INR: 1.10 ratio         PTT - ( 20 May 2018 05:20 )  PTT:26.7 sec  CAPILLARY BLOOD GLUCOSE        LIVER FUNCTIONS - ( 20 May 2018 05:20 )  Alb: 2.9 g/dL / Pro: 7.6 gm/dL / ALK PHOS: 126 U/L / ALT: 39 U/L / AST: 28 U/L / GGT: x    EKG: NSR, Nml axis, NO STT Changes

## 2018-05-21 ENCOUNTER — TRANSCRIPTION ENCOUNTER (OUTPATIENT)
Age: 68
End: 2018-05-21

## 2018-05-21 LAB
ANION GAP SERPL CALC-SCNC: 13 MMOL/L — SIGNIFICANT CHANGE UP (ref 5–17)
BUN SERPL-MCNC: 23 MG/DL — SIGNIFICANT CHANGE UP (ref 7–23)
CALCIUM SERPL-MCNC: 8.2 MG/DL — LOW (ref 8.5–10.1)
CHLORIDE SERPL-SCNC: 110 MMOL/L — HIGH (ref 96–108)
CO2 SERPL-SCNC: 19 MMOL/L — LOW (ref 22–31)
CREAT SERPL-MCNC: 0.93 MG/DL — SIGNIFICANT CHANGE UP (ref 0.5–1.3)
GLUCOSE SERPL-MCNC: 96 MG/DL — SIGNIFICANT CHANGE UP (ref 70–99)
HCT VFR BLD CALC: 29.6 % — LOW (ref 34.5–45)
HGB BLD-MCNC: 9.3 G/DL — LOW (ref 11.5–15.5)
MCHC RBC-ENTMCNC: 31.3 PG — SIGNIFICANT CHANGE UP (ref 27–34)
MCHC RBC-ENTMCNC: 31.4 GM/DL — LOW (ref 32–36)
MCV RBC AUTO: 99.7 FL — SIGNIFICANT CHANGE UP (ref 80–100)
NRBC # BLD: 0 /100 WBCS — SIGNIFICANT CHANGE UP (ref 0–0)
PLATELET # BLD AUTO: 152 K/UL — SIGNIFICANT CHANGE UP (ref 150–400)
POTASSIUM SERPL-MCNC: 4.2 MMOL/L — SIGNIFICANT CHANGE UP (ref 3.5–5.3)
POTASSIUM SERPL-SCNC: 4.2 MMOL/L — SIGNIFICANT CHANGE UP (ref 3.5–5.3)
RBC # BLD: 2.97 M/UL — LOW (ref 3.8–5.2)
RBC # FLD: 20.6 % — HIGH (ref 10.3–14.5)
SODIUM SERPL-SCNC: 142 MMOL/L — SIGNIFICANT CHANGE UP (ref 135–145)
WBC # BLD: 26.26 K/UL — HIGH (ref 3.8–10.5)
WBC # FLD AUTO: 26.26 K/UL — HIGH (ref 3.8–10.5)

## 2018-05-21 PROCEDURE — 99223 1ST HOSP IP/OBS HIGH 75: CPT

## 2018-05-21 PROCEDURE — 93010 ELECTROCARDIOGRAM REPORT: CPT

## 2018-05-21 RX ORDER — ACETAMINOPHEN 500 MG
1000 TABLET ORAL ONCE
Qty: 0 | Refills: 0 | Status: COMPLETED | OUTPATIENT
Start: 2018-05-21 | End: 2018-05-21

## 2018-05-21 RX ORDER — OXYBUTYNIN CHLORIDE 5 MG
5 TABLET ORAL THREE TIMES A DAY
Qty: 0 | Refills: 0 | Status: DISCONTINUED | OUTPATIENT
Start: 2018-05-21 | End: 2018-05-24

## 2018-05-21 RX ORDER — RIVAROXABAN 15 MG-20MG
10 KIT ORAL DAILY
Qty: 0 | Refills: 0 | Status: DISCONTINUED | OUTPATIENT
Start: 2018-05-21 | End: 2018-05-24

## 2018-05-21 RX ORDER — KETOROLAC TROMETHAMINE 30 MG/ML
15 SYRINGE (ML) INJECTION ONCE
Qty: 0 | Refills: 0 | Status: DISCONTINUED | OUTPATIENT
Start: 2018-05-21 | End: 2018-05-21

## 2018-05-21 RX ADMIN — Medication 1000 MILLIGRAM(S): at 19:16

## 2018-05-21 RX ADMIN — Medication 400 MILLIGRAM(S): at 09:44

## 2018-05-21 RX ADMIN — Medication 5 MILLIGRAM(S): at 23:39

## 2018-05-21 RX ADMIN — Medication 100 MILLIGRAM(S): at 21:29

## 2018-05-21 RX ADMIN — Medication 100 MILLIGRAM(S): at 06:00

## 2018-05-21 RX ADMIN — PANTOPRAZOLE SODIUM 40 MILLIGRAM(S): 20 TABLET, DELAYED RELEASE ORAL at 14:22

## 2018-05-21 RX ADMIN — Medication 1000 MILLIGRAM(S): at 00:11

## 2018-05-21 RX ADMIN — Medication 400 MILLIGRAM(S): at 17:48

## 2018-05-21 RX ADMIN — RIVAROXABAN 10 MILLIGRAM(S): KIT at 14:21

## 2018-05-21 RX ADMIN — Medication 100 MILLIGRAM(S): at 00:11

## 2018-05-21 RX ADMIN — Medication 1000 MILLIGRAM(S): at 10:40

## 2018-05-21 NOTE — PROGRESS NOTE ADULT - SUBJECTIVE AND OBJECTIVE BOX
Patient is 69yo female with PMhx of Ovarian Ca s/p debulking surgery, s/p ileostomy, on chemo, HTN, HLD, presents s/p fall at home. Recently the patient has had increased urgency/frequency, got up to go to the bathroom, tripped over pice of furniture, fell onto the R side of the body, hitting her head in the process, sustaining R humeral neck fracture, and R femoral neck fracture.     #Review of system- rest of review of systems are negative except as mentioned in HPI    PHYSICAL EXAM:    Vital Signs Last 24 Hrs  T(C): 36.9 (21 May 2018 10:26), Max: 37.3 (20 May 2018 14:43)  T(F): 98.5 (21 May 2018 10:26), Max: 99.2 (20 May 2018 14:43)  HR: 128 (21 May 2018 12:00) (67 - 128)  BP: 118/61 (21 May 2018 12:00) (111/96 - 171/90)  BP(mean): 76 (21 May 2018 12:00) (76 - 100)  RR: 22 (21 May 2018 12:00) (10 - 22)  SpO2: 95% (21 May 2018 10:19) (95% - 100%)    GENERAL: comfortable   HEAD:  Atraumatic, Normocephalic  EYES: EOMI, PERRLA, conjunctiva and sclera clear  HEENT: Moist mucous membranes  NECK: Supple, No JVD  NERVOUS SYSTEM:  Alert & Oriented X3, Motor Strength 5/5 B/L upper and lower extremities; DTRs 2+ intact and symmetric  CHEST/LUNG: Clear to auscultation bilaterally; No rales, rhonchi, wheezing, or rubs  HEART:S1S2 normal, no murmer  ABDOMEN: Soft, Nontender, Nondistended; Bowel sounds present  GENITOURINARY- Voiding, no palpable bladder  EXTREMITIES:  2+ Peripheral Pulses, No clubbing, cyanosis, or edema  MUSCULOSKELTAL- No muscle tenderness, Muscle tone normal,   SKIN-no rash, no lesion  PSYCH- Mood stable  LYMPH Node- No palpable lymph node    LABS:                        9.3    26.26 )-----------( 152      ( 21 May 2018 07:02 )             29.6     05-21    142  |  110<H>  |  23  ----------------------------<  96  4.2   |  19<L>  |  0.93    Ca    8.2<L>      21 May 2018 07:02    TPro  7.6  /  Alb  2.9<L>  /  TBili  0.2  /  DBili  x   /  AST  28  /  ALT  39  /  AlkPhos  126<H>  05-20    PT/INR - ( 20 May 2018 05:20 )   PT: 11.9 sec;   INR: 1.10 ratio         PTT - ( 20 May 2018 05:20 )  PTT:26.7 sec  Urinalysis Basic - ( 20 May 2018 05:25 )    Color: Yellow / Appearance: Slightly Turbid / S.010 / pH: x  Gluc: x / Ketone: Negative  / Bili: Negative / Urobili: Negative mg/dL   Blood: x / Protein: 100 mg/dL / Nitrite: Negative   Leuk Esterase: Moderate / RBC: 6-10 /HPF / WBC tntc /HPF   Sq Epi: x / Non Sq Epi: x / Bacteria: Moderate        CAPILLARY BLOOD GLUCOSE          CARDIAC MARKERS ( 20 May 2018 05:20 )  <0.015 ng/mL / x     / 50 U/L / x     / x            Standing medicine  acetaminophen   Tablet 650 milliGRAM(s) Oral every 6 hours PRN  aluminum hydroxide/magnesium hydroxide/simethicone Suspension 30 milliLiter(s) Oral four times a day PRN  diphenhydrAMINE   Capsule 25 milliGRAM(s) Oral at bedtime PRN  docusate sodium 100 milliGRAM(s) Oral three times a day  HYDROmorphone PCA (1 mG/mL) 30 milliLiter(s) PCA Continuous PCA Continuous  HYDROmorphone PCA (1 mG/mL) Rescue Clinician Bolus 0.5 milliGRAM(s) IV Push every 15 minutes PRN  naloxone Injectable 0.1 milliGRAM(s) IV Push every 3 minutes PRN  ondansetron Injectable 4 milliGRAM(s) IV Push every 6 hours PRN  ondansetron Injectable 4 milliGRAM(s) IV Push every 6 hours PRN  pantoprazole    Tablet 40 milliGRAM(s) Oral daily  polyethylene glycol 3350 17 Gram(s) Oral daily  rivaroxaban 10 milliGRAM(s) Oral daily  senna 2 Tablet(s) Oral at bedtime PRN

## 2018-05-21 NOTE — DISCHARGE NOTE ADULT - MEDICATION SUMMARY - MEDICATIONS TO CHANGE
I will SWITCH the dose or number of times a day I take the medications listed below when I get home from the hospital:    Xarelto 20 mg oral tablet  -- 1 tab(s) by mouth once a day (in the evening)    oxyCODONE 10 mg oral tablet  -- 1 tab(s) by mouth every 4 hours, As Needed pain

## 2018-05-21 NOTE — PROGRESS NOTE ADULT - ASSESSMENT
68F s/p R hip hemiarthroplasty POD 1  Analgesia  DVT ppx  Incentive spirometry  WBAT BL LE  NWB RUE in sling  P/T  Discharge planning 68F with R prox humerus fx and s/p R hip hemiarthroplasty POD 1  Analgesia  DVT ppx  Incentive spirometry  WBAT RLE  NWB RUE in sling  P/T  Discharge planning

## 2018-05-21 NOTE — DISCHARGE NOTE ADULT - CARE PROVIDER_API CALL
Nish Strong (DO), Orthopaedic Surgery  [address] [place] [address]  Phone: [phone]  Fax: [phone]

## 2018-05-21 NOTE — PROGRESS NOTE ADULT - ASSESSMENT
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

## 2018-05-21 NOTE — PROGRESS NOTE ADULT - SUBJECTIVE AND OBJECTIVE BOX
Pt seen & examined. Pain controlled. No acute events overnight  All vital signs stable (as per nursing flowsheet)  Gen: NAD  RLE:  Dressing clean D&I  +sensation L2-S1  +dorsiflexion/plantarflexion of ankle/hallux  +dorsalis pedis pulse  Soft compartments, - calf tenderness Pt seen & examined. Pain controlled. No acute events overnight  All vital signs stable (as per nursing flowsheet)  Gen: NAD  RLE:  Dressing clean D&I  +sensation L2-S1  +dorsiflexion/plantarflexion of ankle/hallux  +dorsalis pedis pulse  Soft compartments, - calf tenderness    Right Upper Extremity:  Skin intact, +sling  +AIN/PIN/M/R/U/Msc/Ax  SILT C5-T1  +Radial Pulse  Compartments soft  No calf TTP B/L

## 2018-05-21 NOTE — DISCHARGE NOTE ADULT - CARE PLAN
Principal Discharge DX:	Closed fracture of left hip, initial encounter  Goal:	walking and less pain  Assessment and plan of treatment:	Discharge Instructions  Hip Hemiarthroplasty    1. Diet: Resume previous diet  2. Activity: WBAT. Rolling walker. Posterior Hip Dislocation Precautions. Abduction Pillow while in bed for 6 weeks. Daily Physical Therapy. Rolling walker.  3. Call with: fever over 101, wound redness, drainage or open area, calf pain/calf swelling.  4. Wound Care: Remove old and Place new Aquacel bandage to hip wound every 7days. No bandage needed after staple removal.  5. RN to Remove Staples Post Op Day #14 (6/3/18) so long as wound is healed, no drainage or open area.   6. OK to Shower with Aquacel.  Avoid direct water beating on bandage.   7. DVT PE Prophylaxis: see med rec for details. Lovenox for 4 weeks.  8. Labs: Check H&H weekly while on Anticoagulation;  9. Follow Up: Orthopedics, MARTHA CASTILLO 14-21 days in office. Call to schedule. Principal Discharge DX:	Closed fracture of left hip, initial encounter  Goal:	walking and less pain, pain control, physical therapy  Assessment and plan of treatment:	Discharge Instructions  Hip Hemiarthroplasty    1. Diet: Resume previous diet  2. Activity: WBAT. Rolling walker. Posterior Hip Dislocation Precautions. Abduction Pillow while in bed for 6 weeks. Daily Physical Therapy. Rolling walker.  3. Call with: fever over 101, wound redness, drainage or open area, calf pain/calf swelling.  4. Wound Care: Remove old and Place new Aquacel bandage to hip wound every 7days. No bandage needed after staple removal.  5. RN to Remove Staples Post Op Day #14 (6/3/18) so long as wound is healed, no drainage or open area.   6. OK to Shower with Aquacel.  Avoid direct water beating on bandage.   7. DVT PE Prophylaxis: see med rec for details. Lovenox for 4 weeks.  8. Labs: Check H&H weekly while on Anticoagulation;  9. Follow Up: Orthopedics, MARTHA CASTILLO 14-21 days in office. Call to schedule.

## 2018-05-21 NOTE — INPATIENT CERTIFICATION FOR MEDICARE PATIENTS - RISKS OF ADVERSE EVENTS
Concern for cardiopulmonary deterioration/Concern for delay in diagnosis and treatment/Concern for neurologic deterioration/Concern for worsening infectious process/Concern for renal deterioration/Other:

## 2018-05-21 NOTE — PROGRESS NOTE ADULT - ATTENDING COMMENTS
s/p Right hip Hemiarthroplasty.  Right Proximal Humerus Fx    P: DC planning  DVT prophylaxis  Stables to be DC in 14 day  Pain control  F/U in office in 2 weeks

## 2018-05-21 NOTE — DISCHARGE NOTE ADULT - HOSPITAL COURSE
Orthopedic Summary  H&P:  Pt is a 68y Female  PAST MEDICAL & SURGICAL HISTORY:  Perforation of sigmoid colon: s/p Reymundo procedure 4/2016  Ovarian cancer  Other acute pulmonary embolism  Essential hypertension, hypertension with unspecified goal  Malignant neoplasm of ovary, unspecified laterality  History of conization of cervix: 1984  Uterus disorder: removed in 2003, due to fibroids  H/O skin graft: to abdominal wound (11/2016)  S/P cholecystectomy: 2011  H/O abdominal hysterectomy: removal of ovaries, tubes ,omentum,radical debulking  Status post Reymundo procedure: April 2016       Now s/p RIGHT Hip Hemiarthroplasty  for fracture. Pt is afebrile with stable vital signs. Pain is controlled. Exam reveals intact EHL FHL TA GS, +DP. Dressing is clean and dry with a New Aquacel bandage on.    Vitals**  Labs**    Hospital Course:  Patient presented to [facility] ED after a fall, found to have a femoral neck fracture. Pt was  medically/cardiac cleared prior to surgery. Prophylactic antibiotics were started before the procedure and continued for 24 hours. They were admitted after surgery to the orthopedic floor.  **Post op required **unit PRBC transfusion for acute blood loss anemia.  There were no complications during the hospital stay. All home medications were continued.    Routine consults were obtained from the Anticoagulation Team for DVT/PE prophylaxis, from Physical Therapy for posterior hip precaution teaching and twice daily PT, and pt was followed by Medicine for Co-management. Patient was placed on LOVENOX or HEPARIN SC for anticoagulation.  Pertinent home medications were continued.  Daily labs were followed.      On POD 0 there were no overnight events. Received twice daily PT and new Aquacel dressing was applied prior to discharge.The pt will likely need DC to Rehab for ongoing PT, once evaluated and ok per Medicine.  The orthopedic Attending is aware and agrees. See addendum to DC summary per medical team below for any additional info or if any changes. Orthopedic Summary  H&P:  Pt is a 68y Female  PAST MEDICAL & SURGICAL HISTORY:  Perforation of sigmoid colon: s/p Reymundo procedure 4/2016  Ovarian cancer  Other acute pulmonary embolism  Essential hypertension, hypertension with unspecified goal  Malignant neoplasm of ovary, unspecified laterality  History of conization of cervix: 1984  Uterus disorder: removed in 2003, due to fibroids  H/O skin graft: to abdominal wound (11/2016)  S/P cholecystectomy: 2011  H/O abdominal hysterectomy: removal of ovaries, tubes ,omentum,radical debulking  Status post Reymundo procedure: April 2016       Now s/p RIGHT Hip Hemiarthroplasty  for fracture. Pt is afebrile with stable vital signs. Pain is controlled. Exam reveals intact EHL FHL TA GS, +DP. Dressing is clean and dry with a New Aquacel bandage on.    Vitals**  Labs**    Hospital Course:  Patient presented to [facility] ED after a fall, found to have a femoral neck fracture. Pt was  medically/cardiac cleared prior to surgery. Prophylactic antibiotics were started before the procedure and continued for 24 hours. They were admitted after surgery to the orthopedic floor.  **Post op required **unit PRBC transfusion for acute blood loss anemia.  There were no complications during the hospital stay. All home medications were continued.    Routine consults were obtained from the Anticoagulation Team for DVT/PE prophylaxis, from Physical Therapy for posterior hip precaution teaching and twice daily PT, and pt was followed by Medicine for Co-management. Patient was placed on LOVENOX or HEPARIN SC for anticoagulation.  Pertinent home medications were continued.  Daily labs were followed.      On POD 0 there were no overnight events. Received twice daily PT and new Aquacel dressing was applied prior to discharge.The pt will likely need DC to Rehab for ongoing PT, once evaluated and ok per Medicine.  The orthopedic Attending is aware and agrees. See addendum to DC summary per medical team below for any additional info or if any changes.    Addendum - pt does not needs any chemotherapy while she will be in rehab. Her mediport need to be flushed every 4 weeks     #she has also received blood transfusion for post-op acute anemia. Her h/h stable now. Her h/h will be followed at [facility] intermittently.    #she has been treated here for uti as well for 3 days course of abx.     #Her CT abdomen and pelvis showed finding suggestive or recto-vesical fistual. Discussed with these finding with pt who is aware of it. adviseed to have outpt follow up for further management      #d/c today, time spent 65 minutes

## 2018-05-21 NOTE — PROGRESS NOTE ADULT - SUBJECTIVE AND OBJECTIVE BOX
MIKE [id number]      acetaminophen   Tablet 650 milliGRAM(s) Oral every 6 hours PRN  aluminum hydroxide/magnesium hydroxide/simethicone Suspension 30 milliLiter(s) Oral four times a day PRN  diphenhydrAMINE   Capsule 25 milliGRAM(s) Oral at bedtime PRN  docusate sodium 100 milliGRAM(s) Oral three times a day  HYDROmorphone PCA (1 mG/mL) 30 milliLiter(s) PCA Continuous PCA Continuous  HYDROmorphone PCA (1 mG/mL) Rescue Clinician Bolus 0.5 milliGRAM(s) IV Push every 15 minutes PRN  naloxone Injectable 0.1 milliGRAM(s) IV Push every 3 minutes PRN  ondansetron Injectable 4 milliGRAM(s) IV Push every 6 hours PRN  ondansetron Injectable 4 milliGRAM(s) IV Push every 6 hours PRN  pantoprazole    Tablet 40 milliGRAM(s) Oral daily  polyethylene glycol 3350 17 Gram(s) Oral daily  rivaroxaban 10 milliGRAM(s) Oral daily  senna 2 Tablet(s) Oral at bedtime PRN        Physical Exam  T(C): 36.6 (05-21-18 @ 17:18), Max: 37.3 (05-21-18 @ 05:00)  HR: 98 (05-21-18 @ 18:00) (67 - 128)  BP: 111/98 (05-21-18 @ 18:00) (111/96 - 158/77)  RR: 10 (05-21-18 @ 18:00) (10 - 24)  SpO2: 95% (05-21-18 @ 10:19) (95% - 100%)  Wt(kg): --  Constitutional  Feels well, no major complaints  Lungs-clear    Cor-RRR    Abd-soft, non tender. ostomy functioning      Ext-no edema. NV intact. R arm sling

## 2018-05-21 NOTE — DISCHARGE NOTE ADULT - MEDICATION SUMMARY - MEDICATIONS TO STOP TAKING
I will STOP taking the medications listed below when I get home from the hospital:    traMADol 50 mg oral tablet  -- 1 tab(s) by mouth every 4 hours, As Needed pain    methenamine hippurate 1 g oral tablet  -- 1 tab(s) by mouth once a day (at bedtime)    ondansetron 8 mg oral tablet, disintegrating  -- 1 tab(s) by mouth 3 times a day, As Needed - for nausea

## 2018-05-21 NOTE — DISCHARGE NOTE ADULT - PATIENT PORTAL LINK FT
You can access the [facility] Patient Portal, offered by [facility], by registering with the following website: [email or web address]

## 2018-05-21 NOTE — DISCHARGE NOTE ADULT - PLAN OF CARE
walking and less pain Discharge Instructions  Hip Hemiarthroplasty    1. Diet: Resume previous diet  2. Activity: WBAT. Rolling walker. Posterior Hip Dislocation Precautions. Abduction Pillow while in bed for 6 weeks. Daily Physical Therapy. Rolling walker.  3. Call with: fever over 101, wound redness, drainage or open area, calf pain/calf swelling.  4. Wound Care: Remove old and Place new Aquacel bandage to hip wound every 7days. No bandage needed after staple removal.  5. RN to Remove Staples Post Op Day #14 (6/3/18) so long as wound is healed, no drainage or open area.   6. OK to Shower with Aquacel.  Avoid direct water beating on bandage.   7. DVT PE Prophylaxis: see med rec for details. Lovenox for 4 weeks.  8. Labs: Check H&H weekly while on Anticoagulation;  9. Follow Up: Orthopedics, MARTHA CASTILLO 14-21 days in office. Call to schedule. walking and less pain, pain control, physical therapy

## 2018-05-21 NOTE — PHYSICAL THERAPY INITIAL EVALUATION ADULT - PERTINENT HX OF CURRENT PROBLEM, REHAB EVAL
69yo female with PMhx of Ovarian Ca s/p debulking surgery, s/p ileostomy, on chemo, HTN, HLD, presents s/p fall at home. Recently the patient has had increased urgency/frequency, got up to go to the bathroom, tripped over pice of furniture, fell onto the R side of the body, hitting her head in the process, sustaining R humeral neck fracture, and R femoral neck fracture.

## 2018-05-21 NOTE — CONSULT NOTE ADULT - ASSESSMENT
A;  67 yo female With H/O Ovarian Ca, S/P hartmanns procedure in the past, has Ileostomy, fell at her home, fx her left humerus and her Left femoral head, now In SD S/P right THR, Right humerus Non-op, high thrombosis risk      Ok with Ortho to Use xarelto    will start tomorrow am as he has received Lovenox today already    daily cbc, BMP    venodynes, MALACHI  INC mobility per ortho and PT      Thank you for the consult, will follow A;  69 yo female With H/O Ovarian Ca, S/P hartmanns procedure in the past, has Ileostomy, fell at her home, fx her left humerus and her Left femoral head, now In SD S/P right THR, Right humerus Non-op, high thrombosis risk      Ok with Ortho to Use xarelto    will start this am and cont x 35 days total  5/21------->June 25th    daily cbc, BMP    venodynes, MALACHI  INC mobility per ortho and PT      Thank you for the consult, will follow A;  67 yo female With H/O Ovarian Ca, S/P hartmanns procedure in the past, has Ileostomy, fell at her home, fx her right humerus and her right femoral head, now In SD S/P right THR, Right humerus Non-op, high thrombosis risk         cont Xarelto 10 mg po daily    daily cbc, BMP    venodynes, MALACHI  INC mobility per ortho and PT

## 2018-05-21 NOTE — PROGRESS NOTE ADULT - ASSESSMENT
Stable s/p right hip arthroplasty, right humeral fracture. Physical therapy, pain management. DVT prophylaxis

## 2018-05-22 LAB
ANION GAP SERPL CALC-SCNC: 9 MMOL/L — SIGNIFICANT CHANGE UP (ref 5–17)
BUN SERPL-MCNC: 24 MG/DL — HIGH (ref 7–23)
CALCIUM SERPL-MCNC: 7.9 MG/DL — LOW (ref 8.5–10.1)
CHLORIDE SERPL-SCNC: 107 MMOL/L — SIGNIFICANT CHANGE UP (ref 96–108)
CO2 SERPL-SCNC: 23 MMOL/L — SIGNIFICANT CHANGE UP (ref 22–31)
CREAT SERPL-MCNC: 0.9 MG/DL — SIGNIFICANT CHANGE UP (ref 0.5–1.3)
GLUCOSE SERPL-MCNC: 131 MG/DL — HIGH (ref 70–99)
HCT VFR BLD CALC: 23.4 % — LOW (ref 34.5–45)
HGB BLD-MCNC: 7.5 G/DL — LOW (ref 11.5–15.5)
MCHC RBC-ENTMCNC: 31.4 PG — SIGNIFICANT CHANGE UP (ref 27–34)
MCHC RBC-ENTMCNC: 32.1 GM/DL — SIGNIFICANT CHANGE UP (ref 32–36)
MCV RBC AUTO: 97.9 FL — SIGNIFICANT CHANGE UP (ref 80–100)
NRBC # BLD: 0 /100 WBCS — SIGNIFICANT CHANGE UP (ref 0–0)
PLATELET # BLD AUTO: 203 K/UL — SIGNIFICANT CHANGE UP (ref 150–400)
POTASSIUM SERPL-MCNC: 4.1 MMOL/L — SIGNIFICANT CHANGE UP (ref 3.5–5.3)
POTASSIUM SERPL-SCNC: 4.1 MMOL/L — SIGNIFICANT CHANGE UP (ref 3.5–5.3)
RBC # BLD: 2.39 M/UL — LOW (ref 3.8–5.2)
RBC # FLD: 20.1 % — HIGH (ref 10.3–14.5)
SODIUM SERPL-SCNC: 139 MMOL/L — SIGNIFICANT CHANGE UP (ref 135–145)
WBC # BLD: 25.01 K/UL — HIGH (ref 3.8–10.5)
WBC # FLD AUTO: 25.01 K/UL — HIGH (ref 3.8–10.5)

## 2018-05-22 PROCEDURE — 99233 SBSQ HOSP IP/OBS HIGH 50: CPT

## 2018-05-22 RX ORDER — NALOXONE HYDROCHLORIDE 4 MG/.1ML
0.1 SPRAY NASAL
Qty: 0 | Refills: 0 | Status: DISCONTINUED | OUTPATIENT
Start: 2018-05-22 | End: 2018-05-24

## 2018-05-22 RX ORDER — FLAVOXATE HCL 100 MG
100 TABLET ORAL
Qty: 0 | Refills: 0 | Status: DISCONTINUED | OUTPATIENT
Start: 2018-05-22 | End: 2018-05-24

## 2018-05-22 RX ORDER — CEFTRIAXONE 500 MG/1
1000 INJECTION, POWDER, FOR SOLUTION INTRAMUSCULAR; INTRAVENOUS EVERY 24 HOURS
Qty: 0 | Refills: 0 | Status: DISCONTINUED | OUTPATIENT
Start: 2018-05-22 | End: 2018-05-24

## 2018-05-22 RX ORDER — ACETAMINOPHEN 500 MG
1000 TABLET ORAL ONCE
Qty: 0 | Refills: 0 | Status: COMPLETED | OUTPATIENT
Start: 2018-05-22 | End: 2018-05-22

## 2018-05-22 RX ORDER — HYDROMORPHONE HYDROCHLORIDE 2 MG/ML
0.5 INJECTION INTRAMUSCULAR; INTRAVENOUS; SUBCUTANEOUS
Qty: 0 | Refills: 0 | Status: DISCONTINUED | OUTPATIENT
Start: 2018-05-22 | End: 2018-05-23

## 2018-05-22 RX ORDER — HYDROMORPHONE HYDROCHLORIDE 2 MG/ML
30 INJECTION INTRAMUSCULAR; INTRAVENOUS; SUBCUTANEOUS
Qty: 0 | Refills: 0 | Status: DISCONTINUED | OUTPATIENT
Start: 2018-05-22 | End: 2018-05-23

## 2018-05-22 RX ORDER — ONDANSETRON 8 MG/1
4 TABLET, FILM COATED ORAL EVERY 6 HOURS
Qty: 0 | Refills: 0 | Status: DISCONTINUED | OUTPATIENT
Start: 2018-05-22 | End: 2018-05-24

## 2018-05-22 RX ADMIN — Medication 5 MILLIGRAM(S): at 13:08

## 2018-05-22 RX ADMIN — Medication 400 MILLIGRAM(S): at 06:20

## 2018-05-22 RX ADMIN — HYDROMORPHONE HYDROCHLORIDE 30 MILLILITER(S): 2 INJECTION INTRAMUSCULAR; INTRAVENOUS; SUBCUTANEOUS at 03:56

## 2018-05-22 RX ADMIN — Medication 5 MILLIGRAM(S): at 05:09

## 2018-05-22 RX ADMIN — Medication 400 MILLIGRAM(S): at 22:15

## 2018-05-22 RX ADMIN — Medication 1000 MILLIGRAM(S): at 22:24

## 2018-05-22 RX ADMIN — PANTOPRAZOLE SODIUM 40 MILLIGRAM(S): 20 TABLET, DELAYED RELEASE ORAL at 13:08

## 2018-05-22 RX ADMIN — Medication 100 MILLIGRAM(S): at 17:41

## 2018-05-22 RX ADMIN — Medication 100 MILLIGRAM(S): at 05:09

## 2018-05-22 RX ADMIN — Medication 5 MILLIGRAM(S): at 21:37

## 2018-05-22 RX ADMIN — RIVAROXABAN 10 MILLIGRAM(S): KIT at 13:08

## 2018-05-22 RX ADMIN — Medication 100 MILLIGRAM(S): at 23:08

## 2018-05-22 RX ADMIN — HYDROMORPHONE HYDROCHLORIDE 30 MILLILITER(S): 2 INJECTION INTRAMUSCULAR; INTRAVENOUS; SUBCUTANEOUS at 15:50

## 2018-05-22 RX ADMIN — CEFTRIAXONE 1000 MILLIGRAM(S): 500 INJECTION, POWDER, FOR SOLUTION INTRAMUSCULAR; INTRAVENOUS at 17:41

## 2018-05-22 RX ADMIN — Medication 1000 MILLIGRAM(S): at 06:20

## 2018-05-22 NOTE — PROGRESS NOTE ADULT - ATTENDING COMMENTS
Pain control improving  Post Op Anemia    DC planning.  Posterior Hip Precautions  WBAT  Pain control.  DVTprophylaxis  F/U 2 weeks

## 2018-05-22 NOTE — CONSULT NOTE ADULT - SUBJECTIVE AND OBJECTIVE BOX
Patient is a 68y old  Female who presents with a chief complaint of Small Bowel Obstruction (21 May 2018 10:52)    HPI:  Patient is 67yo female with PMhx of Ovarian Ca s/p debulking surgery, s/p ileostomy, on chemo, HTN, HLD, admitted on 5/20 after mechanical fall at home sustaining right humeral head fracture and right femoral neck fracture s/p repair of right femoral neck fracture. Of note the patient has ostomy at site of incision from prior multiple surgeries and every once and a while gets a sharp pain then her urine will turn brown with particulate matter in it. 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 She is on PCA for orthopedic pain and also has intermittent suprapubic pain that is associated with dysuria.               PMH: as above  PSH: as above  Meds: per reconciliation sheet, noted below  MEDICATIONS  (STANDING):  cefTRIAXone Injectable. 1000 milliGRAM(s) IV Push every 24 hours  docusate sodium 100 milliGRAM(s) Oral three times a day  flavoxATE 100 milliGRAM(s) Oral four times a day  HYDROmorphone PCA (1 mG/mL) 30 milliLiter(s) PCA Continuous PCA Continuous  oxybutynin 5 milliGRAM(s) Oral three times a day  pantoprazole    Tablet 40 milliGRAM(s) Oral daily  polyethylene glycol 3350 17 Gram(s) Oral daily  rivaroxaban 10 milliGRAM(s) Oral daily    MEDICATIONS  (PRN):  acetaminophen   Tablet 650 milliGRAM(s) Oral every 6 hours PRN For Temp over 38.3 C (100.94 F)  aluminum hydroxide/magnesium hydroxide/simethicone Suspension 30 milliLiter(s) Oral four times a day PRN Indigestion  bisacodyl Suppository 10 milliGRAM(s) Rectal daily PRN If no bowel movement by POD#2  diphenhydrAMINE   Capsule 25 milliGRAM(s) Oral at bedtime PRN Insomnia  naloxone Injectable 0.1 milliGRAM(s) IV Push every 3 minutes PRN For ANY of the following changes in patient status:  A. RR LESS THAN 10 breaths per minute, B. Oxygen saturation LESS THAN 90%, C. Sedation score of 6  ondansetron Injectable 4 milliGRAM(s) IV Push every 6 hours PRN Nausea  ondansetron Injectable 4 milliGRAM(s) IV Push every 6 hours PRN Nausea and/or Vomiting  senna 2 Tablet(s) Oral at bedtime PRN Constipation    Allergies    Bananas (Unknown)  ibuprofen (Other)  Lexapro (Unknown)  Originally Entered as [Unknown] reaction to [cantelope] (Unknown)  Originally Entered as [Unknown] reaction to [cherries] (Unknown)  Peaches (Unknown)  wallnuts, paches, bananas, honedew and cantalope (Hives; Short breath)  walnut (Stomach Upset (Severe))  Wellbutrin (Hives)    Intolerances    carboplatin (Flushing (Skin); Rash)    Social: no smoking, no alcohol, no illegal drugs; no recent travel, no exposure to TB  FAMILY HISTORY:  No pertinent family history in first degree relatives  No pertinent family history in first degree relatives     no history of premature cardiovascular disease in first degree relatives  ROS: the patient denies fever, no chills, no HA, no dizziness, no sore throat, no blurry vision, no CP, no palpitations, no SOB, no cough, no claudication, no rash, no joint aches, no rectal pain or bleeding, no night sweats  All other systems reviewed and are negative    Vital Signs Last 24 Hrs  T(C): 36.7 (22 May 2018 14:26), Max: 36.8 (22 May 2018 11:00)  T(F): 98.1 (22 May 2018 14:26), Max: 98.3 (22 May 2018 11:00)  HR: 97 (22 May 2018 13:45) (83 - 113)  BP: 135/70 (22 May 2018 13:45) (106/56 - 150/91)  BP(mean): 85 (22 May 2018 13:45) (69 - 106)  RR: 22 (22 May 2018 13:45) (10 - 28)  SpO2: 99% (22 May 2018 13:45) (90% - 100%)  Daily     Daily     PE:    Constitutional: frail looking  HEENT: NC/AT, EOMI, PERRLA, conjunctivae clear; ears and nose atraumatic; pharynx clear  Neck: supple; thyroid not palpable  Back: no tenderness  Respiratory: respiratory effort normal; clear to auscultation  Cardiovascular: S1S2 regular, no murmurs  Abdomen: soft, not tender, not distended, positive BS; no liver or spleen organomegaly; midline incision with ostomy bag over ostomy  Genitourinary: no suprapubic tenderness  Lymphatic: no LN palpable  Musculoskeletal: no muscle tenderness, no joint swelling or tenderness  Neurological/ Psychiatric: AxOx3, judgement and insight normal;  moving all extremities  Skin: no rashes; no palpable lesions    Labs: all available labs reviewed                        7.5    25.01 )-----------( 203      ( 22 May 2018 06:22 )             23.4     05-22    139  |  107  |  24<H>  ----------------------------<  131<H>  4.1   |  23  |  0.90    Ca    7.9<L>      22 May 2018 06:22               Radiology: all available radiological tests reviewed    Advanced directives addressed: full resuscitation

## 2018-05-22 NOTE — CONSULT NOTE ADULT - ASSESSMENT
Patient is 69yo female with PMhx of Ovarian Ca s/p debulking surgery, s/p ileostomy, on chemo, HTN, HLD, admitted on 5/20 after mechanical fall at home sustaining right humeral head fracture and right femoral neck fracture s/p repair of right femoral neck fracture. Of note the patient has ostomy at site of incision from prior multiple surgeries and every once and a while gets a sharp pain then her urine will turn brown with particulate matter in it. 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 where her cancer care has been. She is on PCA for orthopedic pain and also has intermittent suprapubic pain that is associated with dysuria.   1. 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 other issues: Ovarian Ca s/p debulking surgery, s/p ileostomy, on chemo, HTN, HLD  - per medicine

## 2018-05-22 NOTE — PROGRESS NOTE ADULT - SUBJECTIVE AND OBJECTIVE BOX
MIKE [id number]      acetaminophen   Tablet 650 milliGRAM(s) Oral every 6 hours PRN  aluminum hydroxide/magnesium hydroxide/simethicone Suspension 30 milliLiter(s) Oral four times a day PRN  bisacodyl Suppository 10 milliGRAM(s) Rectal daily PRN  cefTRIAXone Injectable. 1000 milliGRAM(s) IV Push every 24 hours  diphenhydrAMINE   Capsule 25 milliGRAM(s) Oral at bedtime PRN  docusate sodium 100 milliGRAM(s) Oral three times a day  flavoxATE 100 milliGRAM(s) Oral four times a day  HYDROmorphone PCA (1 mG/mL) 30 milliLiter(s) PCA Continuous PCA Continuous  HYDROmorphone PCA (1 mG/mL) Rescue Clinician Bolus 0.5 milliGRAM(s) IV Push every 10 minutes PRN  naloxone Injectable 0.1 milliGRAM(s) IV Push every 3 minutes PRN  ondansetron Injectable 4 milliGRAM(s) IV Push every 6 hours PRN  ondansetron Injectable 4 milliGRAM(s) IV Push every 6 hours PRN  oxybutynin 5 milliGRAM(s) Oral three times a day  pantoprazole    Tablet 40 milliGRAM(s) Oral daily  polyethylene glycol 3350 17 Gram(s) Oral daily  rivaroxaban 10 milliGRAM(s) Oral daily  senna 2 Tablet(s) Oral at bedtime PRN        Physical Exam  T(C): 37.8 (05-22-18 @ 21:23), Max: 37.8 (05-22-18 @ 21:23)  HR: 90 (05-22-18 @ 22:00) (83 - 117)  BP: 119/63 (05-22-18 @ 22:00) (119/55 - 156/75)  RR: 16 (05-22-18 @ 22:00) (11 - 27)  SpO2: 94% (05-22-18 @ 21:00) (90% - 100%)  Wt(kg): --  Constitutional  Resting comfortably  Lungs-  clear  Cor-RRR    Abd-soft, non tender. Ostomy with loose fecal drainage      Ext-no edema

## 2018-05-22 NOTE — PROGRESS NOTE ADULT - ASSESSMENT
68F with R prox humerus fx and s/p R hip hemiarthroplasty POD 2  Analgesia  DVT ppx  Incentive spirometry  WBAT RLE  NWB RUE in sling  P/T  Discharge planning

## 2018-05-22 NOTE — PROGRESS NOTE ADULT - SUBJECTIVE AND OBJECTIVE BOX
Patient is 69yo female with PMhx of Ovarian Ca s/p debulking surgery, s/p ileostomy, on chemo, HTN, HLD, presents s/p fall at home. Recently the patient has had increased urgency/frequency, got up to go to the bathroom, tripped over pice of furniture, fell onto the R side of the body, hitting her head in the process, sustaining R humeral neck fracture, and R femoral neck fracture.     #Review of system- rest of review of systems are negative except as mentioned in HPI    PHYSICAL EXAM:    Vital Signs Last 24 Hrs  T(C): 36.7 (22 May 2018 14:26), Max: 36.8 (22 May 2018 11:00)  T(F): 98.1 (22 May 2018 14:26), Max: 98.3 (22 May 2018 11:00)  HR: 91 (22 May 2018 12:00) (83 - 113)  BP: 123/68 (22 May 2018 12:00) (106/56 - 150/91)  BP(mean): 82 (22 May 2018 12:00) (69 - 106)  RR: 14 (22 May 2018 12:00) (10 - 28)  SpO2: 98% (22 May 2018 12:00) (90% - 100%)      GENERAL: comfortable   HEAD:  Atraumatic, Normocephalic  EYES: EOMI, PERRLA, conjunctiva and sclera clear  HEENT: Moist mucous membranes  NECK: Supple, No JVD  NERVOUS SYSTEM:  Alert & Oriented X3,   CHEST/LUNG: Clear to auscultation bilaterally; No rales, rhonchi, wheezing, or rubs  HEART:S1S2 normal, no murmer  ABDOMEN: Soft, Nontender, Nondistended; Bowel sounds present  GENITOURINARY- Voiding, no palpable bladder  EXTREMITIES:  2+ Peripheral Pulses, No clubbing, cyanosis, or edema  MUSCULOSKELETAL No muscle tenderness, Muscle tone normal,   SKIN-no rash, no lesion  PSYCH- Mood stable  LYMPH Node- No palpable lymph node

## 2018-05-22 NOTE — CONSULT NOTE ADULT - CONSULT REASON
DVT/PE prophylaxis, risk stratification and Anticoagulation Management
Pyuria
R hip fracture, R proximal humerus fracture
Mechanical Fall

## 2018-05-22 NOTE — PROGRESS NOTE ADULT - SUBJECTIVE AND OBJECTIVE BOX
HPI:  Patient is 67yo female with PMhx of Ovarian Ca s/p debulking surgery, s/p ileostomy, on chemo, HTN, HLD, presents s/p fall at home. Recently the patient has had increased urgency/frequency, got up to go to the bathroom, tripped over piece of furniture, fell onto the R side of the body, hitting her head in the process, sustaining R humeral neck fracture, and R femoral neck fracture. Pt denies any cardiac history, denies CP, SOB. METs>4, has never had cardiac issues, previously tolerated surgeries under general anesthesia without any issues. (20 May 2018 10:19)      PT now is SD, S/P right THR, Non-OP right humerus fx      Consulted by Dr. Strong  for VTE prophylaxis, risk stratification, and anticoagulation management.    PAST MEDICAL & SURGICAL HISTORY:  Perforation of sigmoid colon: s/p Reymundo procedure 4/2016  Ovarian cancer  Other acute pulmonary embolism  Essential hypertension, hypertension with unspecified goal  Malignant neoplasm of ovary, unspecified laterality  History of conization of cervix: 1984  Uterus disorder: removed in 2003, due to fibroids  H/O skin graft: to abdominal wound (11/2016)  S/P cholecystectomy: 2011  H/O abdominal hysterectomy: removal of ovaries, tubes ,omentum,radical debulking  Status post Reymundo procedure: April 2016          CrCl: 31.3    Caprini VTE Risk Score: #9      IMPROVE Bleeding Risk Score #4.5    Falls Risk:   High (x  )  Mod (  )  Low (  )      FAMILY HISTORY:  No pertinent family history in first degree relatives  No pertinent family history in first degree relatives    Denies any personal or familial history of clotting or bleeding disorders.    Allergies    Bananas (Unknown)  ibuprofen (Other)  Lexapro (Unknown)  Originally Entered as [Unknown] reaction to [cantelope] (Unknown)  Originally Entered as [Unknown] reaction to [cherries] (Unknown)  Peaches (Unknown)  wallnuts, paches, bananas, honedew and cantalope (Hives; Short breath)  walnut (Stomach Upset (Severe))  Wellbutrin (Hives)  Intolerances    carboplatin (Flushing (Skin); Rash)      REVIEW OF SYSTEMS    (  )Fever	     (  )Constipation	(  )SOB				(  )Headache	(  )Dysuria  (  )Chills	     (  )Melena	(  )Dyspnea present on exertion	                    (  )Dizziness                    (  )Polyuria  (  )Nausea	     (  )Hematochezia	(  )Cough			                    (  )Syncope   	(  )Hematuria  (  )Vomiting    (  )Chest Pain	(  )Wheezing			(  )Weakness  (  )Diarrhea     (  )Palpitations	(  )Anorexia			(  )Myalgia    All other review of systems negative: Yes          PHYSICAL EXAM:    Constitutional: Appears Well    Neurological: A& O x 3    Skin: Warm    Respiratory and Thorax: normal effort; Breath sounds: normal; No rales/wheezing/rhonchi  	  Cardiovascular: S1, S2, regular, NMBR	MP ST    Gastrointestinal: BS + x 4Q, nontender,  Ileostomy draining liquid brown drainage    Genitourinary:  Bladder nondistended, nontender    Musculoskeletal:   General Right:   + muscle/joint tenderness,   normal tone, no joint swelling,   ROM: limited	    General Left:   no muscle/joint tenderness,   normal tone, no joint swelling,   ROM: full    Hip:  Right: Dressing CDI;   Right arm: In sling: fingers warm and mobile, radial pulse +. + sensation, + cap refill                      Lower extrems:   Right: no calf tenderness              negative anselmo's sign               + pedal pulses    Left:   no calf tenderness              negative anselmo's sign               + pedal pulses                            7.5    25.01 )-----------( 203      ( 22 May 2018 06:22 )             23.4       05-22    139  |  107  |  24<H>  ----------------------------<  131<H>  4.1   |  23  |  0.90    Ca    7.9<L>      22 May 2018 06:22                                9.3    26.26 )-----------( 152      ( 21 May 2018 07:02 )             29.6       05-21    142  |  110<H>  |  23  ----------------------------<  96  4.2   |  19<L>  |  0.93    Ca    8.2<L>      21 May 2018 07:02    TPro  7.6  /  Alb  2.9<L>  /  TBili  0.2  /  DBili  x   /  AST  28  /  ALT  39  /  AlkPhos  126<H>  05-20      PT/INR - ( 20 May 2018 05:20 )   PT: 11.9 sec;   INR: 1.10 ratio         PTT - ( 20 May 2018 05:20 )  PTT:26.7 sec				      Ct head:  Neg for bleed    MEDICATIONS  (STANDING):  docusate sodium 100 milliGRAM(s) Oral three times a day  HYDROmorphone PCA (1 mG/mL) 30 milliLiter(s) PCA Continuous PCA Continuous  oxybutynin 5 milliGRAM(s) Oral three times a day  pantoprazole    Tablet 40 milliGRAM(s) Oral daily  polyethylene glycol 3350 17 Gram(s) Oral daily  rivaroxaban 10 milliGRAM(s) Oral daily        DVT Prophylaxis:  LMWH                   (  )  Heparin SQ           (  )  Coumadin             (  )  Xarelto                  ( x )  Eliquis                   (  )  Venodynes           ( x )  Ambulation          ( x )  UFH                       (  )  Contraindicated  (  )

## 2018-05-22 NOTE — PROGRESS NOTE ADULT - ASSESSMENT
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

## 2018-05-22 NOTE — PROGRESS NOTE ADULT - SUBJECTIVE AND OBJECTIVE BOX
Pt S/E at bedside, no acute events overnight, pain controlled. No complaints this morning. Pt has been OOBTC with PT, hopes to walk more today.    Vital Signs Last 24 Hrs  T(C): 36.6 (21 May 2018 17:18), Max: 36.9 (21 May 2018 10:26)  T(F): 97.8 (21 May 2018 17:18), Max: 98.5 (21 May 2018 10:26)  HR: 97 (22 May 2018 03:00) (93 - 128)  BP: 124/60 (22 May 2018 02:00) (106/56 - 150/91)  BP(mean): 75 (22 May 2018 02:00) (69 - 106)  RR: 14 (22 May 2018 03:00) (10 - 28)  SpO2: 93% (22 May 2018 03:00) (93% - 98%)    Gen: NAD, AAOx3    Right Lower Extremity:  Dressing clean dry intact  +EHL/FHL/TA/GS  SILT L3-S1  +DP/PT Pulses  Compartments soft  No calf TTP B/L     Right Upper Extremity:  Skin intact, +sling  +AIN/PIN/M/R/U/Msc/Ax  SILT C5-T1  +Radial Pulse  Compartments soft  No calf TTP B/L

## 2018-05-22 NOTE — PROGRESS NOTE ADULT - ASSESSMENT
Stable s/p right hip arthroplasty, right humeral fracture. Physical therapy, pain management. 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 Will attempt to review records and films from prior hospitalization. Antibiotics as per ID. Consider repeat imaging study for fistula.

## 2018-05-22 NOTE — PROGRESS NOTE ADULT - ASSESSMENT
A;  69 yo female With H/O Ovarian Ca, S/P hartmanns procedure in the past, has Ileostomy, fell at her home, fx her right humerus and her right femoral head, now In SD S/P right THR, Right humerus Non-op, high thrombosis risk         cont Xarelto 10 mg po daily    daily cbc, BMP    venodynes, MALACHI  INC mobility per ortho and PT A;  69 yo female With H/O Ovarian Ca, S/P hartmanns procedure in the past, has Ileostomy, fell at her home, fx her right humerus and her right femoral head, now In SD S/P right THR, Right humerus Non-op, high thrombosis risk       being transfused  cont Xarelto 10 mg po daily    daily cbc, BMP    venodynes, MALACHI  INC mobility per ortho and PT

## 2018-05-23 LAB
ANION GAP SERPL CALC-SCNC: 6 MMOL/L — SIGNIFICANT CHANGE UP (ref 5–17)
BUN SERPL-MCNC: 24 MG/DL — HIGH (ref 7–23)
CALCIUM SERPL-MCNC: 8 MG/DL — LOW (ref 8.5–10.1)
CHLORIDE SERPL-SCNC: 105 MMOL/L — SIGNIFICANT CHANGE UP (ref 96–108)
CO2 SERPL-SCNC: 26 MMOL/L — SIGNIFICANT CHANGE UP (ref 22–31)
CREAT SERPL-MCNC: 0.89 MG/DL — SIGNIFICANT CHANGE UP (ref 0.5–1.3)
GLUCOSE SERPL-MCNC: 104 MG/DL — HIGH (ref 70–99)
HCT VFR BLD CALC: 27.7 % — LOW (ref 34.5–45)
HGB BLD-MCNC: 8.8 G/DL — LOW (ref 11.5–15.5)
MCHC RBC-ENTMCNC: 30.9 PG — SIGNIFICANT CHANGE UP (ref 27–34)
MCHC RBC-ENTMCNC: 31.8 GM/DL — LOW (ref 32–36)
MCV RBC AUTO: 97.2 FL — SIGNIFICANT CHANGE UP (ref 80–100)
NRBC # BLD: 0 /100 WBCS — SIGNIFICANT CHANGE UP (ref 0–0)
PLATELET # BLD AUTO: 192 K/UL — SIGNIFICANT CHANGE UP (ref 150–400)
POTASSIUM SERPL-MCNC: 4.2 MMOL/L — SIGNIFICANT CHANGE UP (ref 3.5–5.3)
POTASSIUM SERPL-SCNC: 4.2 MMOL/L — SIGNIFICANT CHANGE UP (ref 3.5–5.3)
RBC # BLD: 2.85 M/UL — LOW (ref 3.8–5.2)
RBC # FLD: 19.7 % — HIGH (ref 10.3–14.5)
SODIUM SERPL-SCNC: 137 MMOL/L — SIGNIFICANT CHANGE UP (ref 135–145)
WBC # BLD: 18.46 K/UL — HIGH (ref 3.8–10.5)
WBC # FLD AUTO: 18.46 K/UL — HIGH (ref 3.8–10.5)

## 2018-05-23 PROCEDURE — 99233 SBSQ HOSP IP/OBS HIGH 50: CPT

## 2018-05-23 RX ORDER — CLONAZEPAM 1 MG
0.5 TABLET ORAL
Qty: 0 | Refills: 0 | Status: DISCONTINUED | OUTPATIENT
Start: 2018-05-23 | End: 2018-05-24

## 2018-05-23 RX ORDER — OXYCODONE HYDROCHLORIDE 5 MG/1
10 TABLET ORAL EVERY 4 HOURS
Qty: 0 | Refills: 0 | Status: DISCONTINUED | OUTPATIENT
Start: 2018-05-23 | End: 2018-05-24

## 2018-05-23 RX ORDER — OXYCODONE HYDROCHLORIDE 5 MG/1
5 TABLET ORAL EVERY 4 HOURS
Qty: 0 | Refills: 0 | Status: DISCONTINUED | OUTPATIENT
Start: 2018-05-23 | End: 2018-05-24

## 2018-05-23 RX ORDER — HYDROMORPHONE HYDROCHLORIDE 2 MG/ML
1 INJECTION INTRAMUSCULAR; INTRAVENOUS; SUBCUTANEOUS EVERY 4 HOURS
Qty: 0 | Refills: 0 | Status: DISCONTINUED | OUTPATIENT
Start: 2018-05-23 | End: 2018-05-23

## 2018-05-23 RX ORDER — MORPHINE SULFATE 50 MG/1
2 CAPSULE, EXTENDED RELEASE ORAL EVERY 4 HOURS
Qty: 0 | Refills: 0 | Status: DISCONTINUED | OUTPATIENT
Start: 2018-05-23 | End: 2018-05-23

## 2018-05-23 RX ORDER — HYDROMORPHONE HYDROCHLORIDE 2 MG/ML
1 INJECTION INTRAMUSCULAR; INTRAVENOUS; SUBCUTANEOUS EVERY 4 HOURS
Qty: 0 | Refills: 0 | Status: DISCONTINUED | OUTPATIENT
Start: 2018-05-23 | End: 2018-05-24

## 2018-05-23 RX ADMIN — Medication 25 MILLIGRAM(S): at 23:16

## 2018-05-23 RX ADMIN — RIVAROXABAN 10 MILLIGRAM(S): KIT at 12:57

## 2018-05-23 RX ADMIN — Medication 650 MILLIGRAM(S): at 23:23

## 2018-05-23 RX ADMIN — OXYCODONE HYDROCHLORIDE 10 MILLIGRAM(S): 5 TABLET ORAL at 15:50

## 2018-05-23 RX ADMIN — HYDROMORPHONE HYDROCHLORIDE 1 MILLIGRAM(S): 2 INJECTION INTRAMUSCULAR; INTRAVENOUS; SUBCUTANEOUS at 22:38

## 2018-05-23 RX ADMIN — Medication 5 MILLIGRAM(S): at 15:50

## 2018-05-23 RX ADMIN — ONDANSETRON 4 MILLIGRAM(S): 8 TABLET, FILM COATED ORAL at 20:58

## 2018-05-23 RX ADMIN — Medication 5 MILLIGRAM(S): at 20:57

## 2018-05-23 RX ADMIN — HYDROMORPHONE HYDROCHLORIDE 1 MILLIGRAM(S): 2 INJECTION INTRAMUSCULAR; INTRAVENOUS; SUBCUTANEOUS at 22:08

## 2018-05-23 RX ADMIN — HYDROMORPHONE HYDROCHLORIDE 1 MILLIGRAM(S): 2 INJECTION INTRAMUSCULAR; INTRAVENOUS; SUBCUTANEOUS at 18:09

## 2018-05-23 RX ADMIN — Medication 100 MILLIGRAM(S): at 23:16

## 2018-05-23 RX ADMIN — Medication 100 MILLIGRAM(S): at 12:57

## 2018-05-23 RX ADMIN — HYDROMORPHONE HYDROCHLORIDE 30 MILLILITER(S): 2 INJECTION INTRAMUSCULAR; INTRAVENOUS; SUBCUTANEOUS at 02:39

## 2018-05-23 RX ADMIN — Medication 0.5 MILLIGRAM(S): at 22:46

## 2018-05-23 RX ADMIN — PANTOPRAZOLE SODIUM 40 MILLIGRAM(S): 20 TABLET, DELAYED RELEASE ORAL at 12:58

## 2018-05-23 RX ADMIN — CEFTRIAXONE 1000 MILLIGRAM(S): 500 INJECTION, POWDER, FOR SOLUTION INTRAMUSCULAR; INTRAVENOUS at 18:03

## 2018-05-23 RX ADMIN — Medication 100 MILLIGRAM(S): at 18:04

## 2018-05-23 RX ADMIN — OXYCODONE HYDROCHLORIDE 10 MILLIGRAM(S): 5 TABLET ORAL at 19:47

## 2018-05-23 RX ADMIN — OXYCODONE HYDROCHLORIDE 10 MILLIGRAM(S): 5 TABLET ORAL at 20:45

## 2018-05-23 RX ADMIN — Medication 5 MILLIGRAM(S): at 05:08

## 2018-05-23 RX ADMIN — OXYCODONE HYDROCHLORIDE 10 MILLIGRAM(S): 5 TABLET ORAL at 11:25

## 2018-05-23 RX ADMIN — Medication 100 MILLIGRAM(S): at 05:08

## 2018-05-23 NOTE — PROGRESS NOTE ADULT - ASSESSMENT
A 69 yo female With H/O Ovarian Ca, S/P hartmanns procedure in the past, has Ileostomy, fell at her home, fx her right humerus and her right femoral head, now S/P right THR, Right humerus Non-op, high thrombosis risk. Pt transferred to  today. Discussed risks/benefits of Xarelto.    PLAN:   cont Xarelto 10 mg po daily x 35 days (5/21/18 to 6/11/18)   daily cbc, BMP  maintain b/l venodynes  INC mobility per ortho and PT    Will continue to follow

## 2018-05-23 NOTE — PROGRESS NOTE ADULT - SUBJECTIVE AND OBJECTIVE BOX
Patient is a 68y old  Female who presents with a chief complaint of Small Bowel Obstruction (21 May 2018 10:52)    Date of service: 05-23-18 @ 14:25  Patient still with right shoulder pain  ROS: no fever or chills; denies dizziness, no HA, no SOB or cough, no abdominal pain, no diarrhea or constipation; no dysuria, no urinary frequency, no legs pain, no rashes    MEDICATIONS  (STANDING):  cefTRIAXone Injectable. 1000 milliGRAM(s) IV Push every 24 hours  docusate sodium 100 milliGRAM(s) Oral three times a day  flavoxATE 100 milliGRAM(s) Oral four times a day  oxybutynin 5 milliGRAM(s) Oral three times a day  pantoprazole    Tablet 40 milliGRAM(s) Oral daily  polyethylene glycol 3350 17 Gram(s) Oral daily  rivaroxaban 10 milliGRAM(s) Oral daily    MEDICATIONS  (PRN):  acetaminophen   Tablet 650 milliGRAM(s) Oral every 6 hours PRN For Temp over 38.3 C (100.94 F)  aluminum hydroxide/magnesium hydroxide/simethicone Suspension 30 milliLiter(s) Oral four times a day PRN Indigestion  bisacodyl Suppository 10 milliGRAM(s) Rectal daily PRN If no bowel movement by POD#2  diphenhydrAMINE   Capsule 25 milliGRAM(s) Oral at bedtime PRN Insomnia  HYDROmorphone PCA (1 mG/mL) Rescue Clinician Bolus 0.5 milliGRAM(s) IV Push every 10 minutes PRN Severe Pain (7 - 10)  morphine  - Injectable 2 milliGRAM(s) IV Push every 4 hours PRN Severe Pain (7 - 10)  naloxone Injectable 0.1 milliGRAM(s) IV Push every 3 minutes PRN For ANY of the following changes in patient status:  A. RR LESS THAN 10 breaths per minute, B. Oxygen saturation LESS THAN 90%, C. Sedation score of 6  ondansetron Injectable 4 milliGRAM(s) IV Push every 6 hours PRN Nausea  ondansetron Injectable 4 milliGRAM(s) IV Push every 6 hours PRN Nausea and/or Vomiting  oxyCODONE    IR 5 milliGRAM(s) Oral every 4 hours PRN Mild Pain (1 - 3)  oxyCODONE    IR 10 milliGRAM(s) Oral every 4 hours PRN Moderate Pain (4 - 6)  senna 2 Tablet(s) Oral at bedtime PRN Constipation      Vital Signs Last 24 Hrs  T(C): 36.9 (23 May 2018 13:24), Max: 37.8 (22 May 2018 21:23)  T(F): 98.5 (23 May 2018 13:24), Max: 100.1 (22 May 2018 21:23)  HR: 99 (23 May 2018 13:24) (76 - 125)  BP: 135/74 (23 May 2018 13:24) (94/50 - 156/75)  BP(mean): 82 (23 May 2018 10:00) (60 - 101)  RR: 16 (23 May 2018 13:24) (7 - 24)  SpO2: 99% (23 May 2018 13:24) (93% - 99%)    Physical Exam:          Constitutional: frail looking  HEENT: NC/AT, EOMI, PERRLA, conjunctivae clear; ears and nose atraumatic; pharynx clear  Neck: supple; thyroid not palpable  Respiratory: respiratory effort normal; clear to auscultation  Cardiovascular: S1S2 regular, no murmurs  Abdomen: soft, not tender, not distended, positive BS; no liver or spleen organomegaly; midline incision with ostomy bag over ostomy  Genitourinary: no suprapubic tenderness  Lymphatic: no LN palpable  Musculoskeletal: no muscle tenderness, no joint swelling or tenderness  Neurological/ Psychiatric: AxOx3, judgement and insight normal; right upper extremity in sling  Skin: no rashes; no palpable lesions    Labs: all available labs reviewed                        Labs:                        8.8    18.46 )-----------( 192      ( 23 May 2018 05:07 )             27.7     05-23    137  |  105  |  24<H>  ----------------------------<  104<H>  4.2   |  26  |  0.89    Ca    8.0<L>      23 May 2018 05:07             Cultures:             Radiology: all available radiological tests reviewed    Advanced directives addressed: full resuscitation

## 2018-05-23 NOTE — PROGRESS NOTE ADULT - SUBJECTIVE AND OBJECTIVE BOX
MIKE [id number]      acetaminophen   Tablet 650 milliGRAM(s) Oral every 6 hours PRN  aluminum hydroxide/magnesium hydroxide/simethicone Suspension 30 milliLiter(s) Oral four times a day PRN  bisacodyl Suppository 10 milliGRAM(s) Rectal daily PRN  cefTRIAXone Injectable. 1000 milliGRAM(s) IV Push every 24 hours  clonazePAM Tablet 0.5 milliGRAM(s) Oral two times a day PRN  diphenhydrAMINE   Capsule 25 milliGRAM(s) Oral at bedtime PRN  docusate sodium 100 milliGRAM(s) Oral three times a day  flavoxATE 100 milliGRAM(s) Oral four times a day  HYDROmorphone  Injectable 1 milliGRAM(s) SubCutaneous every 4 hours PRN  naloxone Injectable 0.1 milliGRAM(s) IV Push every 3 minutes PRN  ondansetron Injectable 4 milliGRAM(s) IV Push every 6 hours PRN  ondansetron Injectable 4 milliGRAM(s) IV Push every 6 hours PRN  oxybutynin 5 milliGRAM(s) Oral three times a day  oxyCODONE    IR 5 milliGRAM(s) Oral every 4 hours PRN  oxyCODONE    IR 10 milliGRAM(s) Oral every 4 hours PRN  pantoprazole    Tablet 40 milliGRAM(s) Oral daily  polyethylene glycol 3350 17 Gram(s) Oral daily  rivaroxaban 10 milliGRAM(s) Oral daily  senna 2 Tablet(s) Oral at bedtime PRN        Physical Exam  T(C): 37.7 (05-23-18 @ 17:27), Max: 37.7 (05-23-18 @ 17:27)  HR: 99 (05-23-18 @ 17:27) (76 - 125)  BP: 126/66 (05-23-18 @ 17:27) (94/50 - 139/64)  RR: 16 (05-23-18 @ 17:27) (7 - 23)  SpO2: 98% (05-23-18 @ 17:27) (94% - 99%)  Wt(kg): --  Constitutional C/o hip pain    Lungs-clear    Cor-RRR    Abd-soft, non tender      Ext-  no edema

## 2018-05-23 NOTE — PROGRESS NOTE ADULT - ASSESSMENT
A/P    #Right hip fracture s/p surgery   -pain control, further management as per surgical team   -discharge plan     #right humerus neck fracture- supportive care and conservative measure     #increase in frequency of urination and suprapubic pain- abx empirically for uti     #Acute anemia due to blood loss due to surgery- s/p transfusion of one unit of prbc     #DVT pr -DOAC     #Above discussed with pt and  at bedside, all questions have been answered

## 2018-05-23 NOTE — PROGRESS NOTE ADULT - SUBJECTIVE AND OBJECTIVE BOX
Pt S/E at bedside, no acute events overnight, pain controlled. No complaints this morning. Pt was able to walk to the door and sit in chair yesterday. Denies cp/sob.    Vital Signs Last 24 Hrs  T(C): 37.3 (23 May 2018 06:03), Max: 37.8 (22 May 2018 21:23)  T(F): 99.1 (23 May 2018 06:03), Max: 100.1 (22 May 2018 21:23)  HR: 91 (23 May 2018 04:00) (76 - 117)  BP: 119/59 (23 May 2018 04:00) (94/50 - 156/75)  BP(mean): 74 (23 May 2018 04:00) (60 - 95)  RR: 13 (23 May 2018 04:00) (9 - 27)  SpO2: 98% (23 May 2018 02:00) (93% - 100%)    Gen: NAD, AAOx3    Right Lower Extremity:  Dressing clean dry intact, changed today, incision well approx with staples, no erythema/drainage  +EHL/FHL/TA/GS  SILT L3-S1  +DP/PT Pulses  Compartments soft  No calf TTP B/L     Right Upper Extremity:  Skin intact, +sling  +AIN/PIN/M/R/U/Msc/Ax  SILT C5-T1  +Radial Pulse  Compartments soft  No calf TTP B/L

## 2018-05-23 NOTE — PROGRESS NOTE ADULT - SUBJECTIVE AND OBJECTIVE BOX
HPI: Patient is 67yo female with PMhx of Ovarian Ca s/p debulking surgery, s/p ileostomy, on chemo, HTN, HLD, presents s/p fall at home. Recently the patient has had increased urgency/frequency, got up to go to the bathroom, tripped over piece of furniture, fell onto the R side of the body, hitting her head in the process, sustaining R humeral neck fracture, and R femoral neck fracture. Pt denies any cardiac history, denies CP, SOB. METs>4, has never had cardiac issues, previously tolerated surgeries under general anesthesia without any issues. (20 May 2018 10:19)    PT now is SD, S/P right THR, Non-OP right humerus fx    Consulted by Dr. Strong  for VTE prophylaxis, risk stratification, and anticoagulation management.    PAST MEDICAL & SURGICAL HISTORY:  Perforation of sigmoid colon: s/p Reymundo procedure 4/2016  Ovarian cancer  Other acute pulmonary embolism  Essential hypertension, hypertension with unspecified goal  Malignant neoplasm of ovary, unspecified laterality  History of conization of cervix: 1984  Uterus disorder: removed in 2003, due to fibroids  H/O skin graft: to abdominal wound (11/2016)  S/P cholecystectomy: 2011  H/O abdominal hysterectomy: removal of ovaries, tubes ,omentum,radical debulking  Status post Reymundo procedure: April 2016    CrCl: 31.3    Caprini VTE Risk Score: #9      IMPROVE Bleeding Risk Score #4.5    Falls Risk:   High (x  )  Mod (  )  Low (  )      FAMILY HISTORY:  No pertinent family history in first degree relatives  No pertinent family history in first degree relatives    Denies any personal or familial history of clotting or bleeding disorders.    Allergies    Bananas (Unknown)  ibuprofen (Other)  Lexapro (Unknown)  Originally Entered as [Unknown] reaction to [cantelope] (Unknown)  Originally Entered as [Unknown] reaction to [cherries] (Unknown)  Peaches (Unknown)  wallnuts, paches, bananas, honedew and cantalope (Hives; Short breath)  walnut (Stomach Upset (Severe))  Wellbutrin (Hives)  Intolerances    carboplatin (Flushing (Skin); Rash)      REVIEW OF SYSTEMS    (  )Fever	     (  )Constipation	(  )SOB				(  )Headache	(  )Dysuria  (  )Chills	     (  )Melena	(  )Dyspnea present on exertion	                    (  )Dizziness                    (  )Polyuria  (  )Nausea	     (  )Hematochezia	(  )Cough			                    (  )Syncope   	(  )Hematuria  (  )Vomiting    (  )Chest Pain	(  )Wheezing			(  )Weakness  (  )Diarrhea     (  )Palpitations	(  )Anorexia			(  )Myalgia    All other review of systems negative: Yes    PHYSICAL EXAM:    Constitutional: Appears Well    Neurological: A& O x 3    Skin: Warm    Respiratory and Thorax: normal effort; Breath sounds: normal; No rales/wheezing/rhonchi  	  Cardiovascular: S1, S2, regular, NMBR	MP ST    Gastrointestinal: BS + x 4Q, nontender, Ileostomy draining liquid brown drainage    Genitourinary:  Bladder nondistended, nontender    Musculoskeletal:   General Right:   + muscle/joint tenderness,   normal tone, no joint swelling,   ROM: limited, abduction pillow in place	    General Left:   no muscle/joint tenderness,   normal tone, no joint swelling,   ROM: full    Hip:  Right: Dressing CDI;   Right arm: In sling: fingers warm and mobile, radial pulse +. intact sensation, + cap refill              Lower extrems:   Right: no calf tenderness              negative anselmo's sign               + pedal pulses    Left:   no calf tenderness              negative anselmo's sign               + pedal pulses                          8.8    18.46 )-----------( 192      ( 23 May 2018 05:07 )             27.7       05-23    137  |  105  |  24<H>  ----------------------------<  104<H>  4.2   |  26  |  0.89    Ca    8.0<L>      23 May 2018 05:07                          7.5    25.01 )-----------( 203      ( 22 May 2018 06:22 )             23.4       05-22    139  |  107  |  24<H>  ----------------------------<  131<H>  4.1   |  23  |  0.90    Ca    7.9<L>      22 May 2018 06:22    PT/INR - ( 20 May 2018 05:20 )   PT: 11.9 sec;   INR: 1.10 ratio    PTT - ( 20 May 2018 05:20 )  PTT:26.7 sec				    Ct head:  Neg for bleed    MEDICATIONS  (STANDING):  cefTRIAXone Injectable. 1000 milliGRAM(s) IV Push every 24 hours  docusate sodium 100 milliGRAM(s) Oral three times a day  flavoxATE 100 milliGRAM(s) Oral four times a day  oxybutynin 5 milliGRAM(s) Oral three times a day  pantoprazole    Tablet 40 milliGRAM(s) Oral daily  polyethylene glycol 3350 17 Gram(s) Oral daily  rivaroxaban 10 milliGRAM(s) Oral daily    DVT Prophylaxis:  LMWH                   (  )  Heparin SQ           (  )  Coumadin             (  )  Xarelto                  ( x )  Eliquis                   (  )  Venodynes           ( x )  Ambulation          ( x )  UFH                       (  )  Contraindicated  (  )

## 2018-05-23 NOTE — PROGRESS NOTE ADULT - ASSESSMENT
Stable s/p right hip arthroplasty, right humeral fracture. Physical therapy, pain management. 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 Will attempt to review records and films from prior hospitalization. Antibiotics as per ID. Will repeat imaging study for fistula.

## 2018-05-23 NOTE — PROGRESS NOTE ADULT - ASSESSMENT
68F with R prox humerus fx and s/p R hip hemiarthroplasty POD 3  Analgesia  DVT ppx  Incentive spirometry  WBAT RLE, posterior hip precautions, abduction pillow  NWB RUE in sling, pendulum/codman exercises   P/T  Discharge planning  ortho stable for DC

## 2018-05-23 NOTE — PROGRESS NOTE ADULT - SUBJECTIVE AND OBJECTIVE BOX
Patient is 69yo female with PMhx of Ovarian Ca s/p debulking surgery, s/p ileostomy, on chemo, HTN, HLD, presents s/p fall at home. Recently the patient has had increased urgency/frequency, got up to go to the bathroom, tripped over pice of furniture, fell onto the R side of the body, hitting her head in the process, sustaining R humeral neck fracture, and R femoral neck fracture.     #Review of system- rest of review of systems are negative except as mentioned in HPI    PHYSICAL EXAM:    Vital Signs Last 24 Hrs  T(C): 36.9 (23 May 2018 13:24), Max: 37.8 (22 May 2018 21:23)  T(F): 98.5 (23 May 2018 13:24), Max: 100.1 (22 May 2018 21:23)  HR: 99 (23 May 2018 13:24) (76 - 125)  BP: 135/74 (23 May 2018 13:24) (94/50 - 156/75)  BP(mean): 82 (23 May 2018 10:00) (60 - 101)  RR: 16 (23 May 2018 13:24) (7 - 24)  SpO2: 99% (23 May 2018 13:24) (94% - 99%)      GENERAL: comfortable   HEAD:  Atraumatic, Normocephalic  EYES: EOMI, PERRLA, conjunctiva and sclera clear  HEENT: Moist mucous membranes  NECK: Supple, No JVD  NERVOUS SYSTEM:  Alert & Oriented X3,   CHEST/LUNG: Clear to auscultation bilaterally; No rales, rhonchi, wheezing, or rubs  HEART:S1S2 normal, no murmer  ABDOMEN: Soft, Nontender, Nondistended; Bowel sounds present  GENITOURINARY- Voiding, no palpable bladder  EXTREMITIES:  2+ Peripheral Pulses, No clubbing, cyanosis, or edema  MUSCULOSKELETAL No muscle tenderness, Muscle tone normal,   SKIN-no rash, no lesion  PSYCH- Mood stable  LYMPH Node- No palpable lymph node

## 2018-05-23 NOTE — PROGRESS NOTE ADULT - ASSESSMENT
Patient is 67yo female with PMhx of Ovarian Ca s/p debulking surgery, s/p ileostomy, on chemo, HTN, HLD, admitted on 5/20 after mechanical fall at home sustaining right humeral head fracture and right femoral neck fracture s/p repair of right femoral neck fracture. Of note the patient has ostomy at site of incision from prior multiple surgeries and every once and a while gets a sharp pain then her urine will turn brown with particulate matter in it. 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 She is on PCA for orthopedic pain and also has intermittent suprapubic pain that is associated with dysuria.   1. 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 other issues: Ovarian Ca s/p debulking surgery, s/p ileostomy, on chemo, HTN, HLD  - per medicine

## 2018-05-24 VITALS
RESPIRATION RATE: 16 BRPM | DIASTOLIC BLOOD PRESSURE: 64 MMHG | SYSTOLIC BLOOD PRESSURE: 130 MMHG | TEMPERATURE: 100 F | HEART RATE: 98 BPM | OXYGEN SATURATION: 98 %

## 2018-05-24 LAB
ANION GAP SERPL CALC-SCNC: 7 MMOL/L — SIGNIFICANT CHANGE UP (ref 5–17)
BLD GP AB SCN SERPL QL: SIGNIFICANT CHANGE UP
BUN SERPL-MCNC: 21 MG/DL — SIGNIFICANT CHANGE UP (ref 7–23)
CALCIUM SERPL-MCNC: 8 MG/DL — LOW (ref 8.5–10.1)
CHLORIDE SERPL-SCNC: 104 MMOL/L — SIGNIFICANT CHANGE UP (ref 96–108)
CO2 SERPL-SCNC: 25 MMOL/L — SIGNIFICANT CHANGE UP (ref 22–31)
CREAT SERPL-MCNC: 0.76 MG/DL — SIGNIFICANT CHANGE UP (ref 0.5–1.3)
GLUCOSE SERPL-MCNC: 91 MG/DL — SIGNIFICANT CHANGE UP (ref 70–99)
HCT VFR BLD CALC: 23.7 % — LOW (ref 34.5–45)
HGB BLD-MCNC: 7.7 G/DL — LOW (ref 11.5–15.5)
MCHC RBC-ENTMCNC: 31.2 PG — SIGNIFICANT CHANGE UP (ref 27–34)
MCHC RBC-ENTMCNC: 32.5 GM/DL — SIGNIFICANT CHANGE UP (ref 32–36)
MCV RBC AUTO: 96 FL — SIGNIFICANT CHANGE UP (ref 80–100)
NRBC # BLD: 0 /100 WBCS — SIGNIFICANT CHANGE UP (ref 0–0)
PLATELET # BLD AUTO: 239 K/UL — SIGNIFICANT CHANGE UP (ref 150–400)
POTASSIUM SERPL-MCNC: 4 MMOL/L — SIGNIFICANT CHANGE UP (ref 3.5–5.3)
POTASSIUM SERPL-SCNC: 4 MMOL/L — SIGNIFICANT CHANGE UP (ref 3.5–5.3)
RBC # BLD: 2.47 M/UL — LOW (ref 3.8–5.2)
RBC # FLD: 18.7 % — HIGH (ref 10.3–14.5)
SODIUM SERPL-SCNC: 136 MMOL/L — SIGNIFICANT CHANGE UP (ref 135–145)
TYPE + AB SCN PNL BLD: SIGNIFICANT CHANGE UP
WBC # BLD: 14.68 K/UL — HIGH (ref 3.8–10.5)
WBC # FLD AUTO: 14.68 K/UL — HIGH (ref 3.8–10.5)

## 2018-05-24 PROCEDURE — 74177 CT ABD & PELVIS W/CONTRAST: CPT | Mod: 26

## 2018-05-24 PROCEDURE — 99233 SBSQ HOSP IP/OBS HIGH 50: CPT

## 2018-05-24 RX ORDER — OXYBUTYNIN CHLORIDE 5 MG
1 TABLET ORAL
Qty: 0 | Refills: 0 | COMMUNITY
Start: 2018-05-24

## 2018-05-24 RX ORDER — DOCUSATE SODIUM 100 MG
1 CAPSULE ORAL
Qty: 0 | Refills: 0 | COMMUNITY
Start: 2018-05-24

## 2018-05-24 RX ORDER — PANTOPRAZOLE SODIUM 20 MG/1
1 TABLET, DELAYED RELEASE ORAL
Qty: 0 | Refills: 0 | COMMUNITY
Start: 2018-05-24

## 2018-05-24 RX ORDER — ONDANSETRON 8 MG/1
1 TABLET, FILM COATED ORAL
Qty: 0 | Refills: 0 | COMMUNITY

## 2018-05-24 RX ORDER — GEMCITABINE 38 MG/ML
0 INJECTION, SOLUTION INTRAVENOUS
Qty: 0 | Refills: 0 | COMMUNITY

## 2018-05-24 RX ORDER — OXYCODONE HYDROCHLORIDE 5 MG/1
1 TABLET ORAL
Qty: 0 | Refills: 0 | COMMUNITY
Start: 2018-05-24

## 2018-05-24 RX ORDER — ESTRADIOL 4 UG/1
1 INSERT VAGINAL
Qty: 0 | Refills: 0 | COMMUNITY

## 2018-05-24 RX ORDER — POLYETHYLENE GLYCOL 3350 17 G/17G
17 POWDER, FOR SOLUTION ORAL
Qty: 0 | Refills: 0 | COMMUNITY
Start: 2018-05-24

## 2018-05-24 RX ORDER — METHENAMINE MANDELATE 1 G
1 TABLET ORAL
Qty: 0 | Refills: 0 | COMMUNITY

## 2018-05-24 RX ORDER — OXYCODONE HYDROCHLORIDE 5 MG/1
1 TABLET ORAL
Qty: 0 | Refills: 0 | COMMUNITY

## 2018-05-24 RX ORDER — RIVAROXABAN 15 MG-20MG
1 KIT ORAL
Qty: 0 | Refills: 0 | COMMUNITY
Start: 2018-05-24

## 2018-05-24 RX ORDER — MONTELUKAST 4 MG/1
1 TABLET, CHEWABLE ORAL
Qty: 0 | Refills: 0 | COMMUNITY

## 2018-05-24 RX ORDER — TRAMADOL HYDROCHLORIDE 50 MG/1
1 TABLET ORAL
Qty: 0 | Refills: 0 | COMMUNITY

## 2018-05-24 RX ADMIN — HYDROMORPHONE HYDROCHLORIDE 1 MILLIGRAM(S): 2 INJECTION INTRAMUSCULAR; INTRAVENOUS; SUBCUTANEOUS at 02:23

## 2018-05-24 RX ADMIN — Medication 5 MILLIGRAM(S): at 13:41

## 2018-05-24 RX ADMIN — OXYCODONE HYDROCHLORIDE 10 MILLIGRAM(S): 5 TABLET ORAL at 00:36

## 2018-05-24 RX ADMIN — HYDROMORPHONE HYDROCHLORIDE 1 MILLIGRAM(S): 2 INJECTION INTRAMUSCULAR; INTRAVENOUS; SUBCUTANEOUS at 10:24

## 2018-05-24 RX ADMIN — OXYCODONE HYDROCHLORIDE 10 MILLIGRAM(S): 5 TABLET ORAL at 18:19

## 2018-05-24 RX ADMIN — RIVAROXABAN 10 MILLIGRAM(S): KIT at 13:41

## 2018-05-24 RX ADMIN — Medication 100 MILLIGRAM(S): at 13:41

## 2018-05-24 RX ADMIN — Medication 5 MILLIGRAM(S): at 04:42

## 2018-05-24 RX ADMIN — HYDROMORPHONE HYDROCHLORIDE 1 MILLIGRAM(S): 2 INJECTION INTRAMUSCULAR; INTRAVENOUS; SUBCUTANEOUS at 16:17

## 2018-05-24 RX ADMIN — OXYCODONE HYDROCHLORIDE 10 MILLIGRAM(S): 5 TABLET ORAL at 05:30

## 2018-05-24 RX ADMIN — OXYCODONE HYDROCHLORIDE 10 MILLIGRAM(S): 5 TABLET ORAL at 08:18

## 2018-05-24 RX ADMIN — OXYCODONE HYDROCHLORIDE 10 MILLIGRAM(S): 5 TABLET ORAL at 04:42

## 2018-05-24 RX ADMIN — OXYCODONE HYDROCHLORIDE 10 MILLIGRAM(S): 5 TABLET ORAL at 00:04

## 2018-05-24 RX ADMIN — HYDROMORPHONE HYDROCHLORIDE 1 MILLIGRAM(S): 2 INJECTION INTRAMUSCULAR; INTRAVENOUS; SUBCUTANEOUS at 03:00

## 2018-05-24 RX ADMIN — Medication 100 MILLIGRAM(S): at 04:41

## 2018-05-24 RX ADMIN — PANTOPRAZOLE SODIUM 40 MILLIGRAM(S): 20 TABLET, DELAYED RELEASE ORAL at 13:41

## 2018-05-24 RX ADMIN — OXYCODONE HYDROCHLORIDE 10 MILLIGRAM(S): 5 TABLET ORAL at 13:41

## 2018-05-24 NOTE — PROGRESS NOTE ADULT - ASSESSMENT
A/P    #Right hip fracture s/p surgery   -pin control and rehab     #right humerus neck fracture- supportive care and conservative measure     #increase in frequency of urination and suprapubic pain-s/p abx for uti     #Acute anemia due to blood loss due to surgery- s/p transfusion of 2 unit of prbc     #Ct finding reviewed with pt and advised to have out pt follow up and management of fistula     #DVT pr -DOAC     #Above discussed with pt and  at bedside, all questions have been answered     #d/c today, please see d/c note done on 5/24 for detail. time spent 65 minutes

## 2018-05-24 NOTE — PROGRESS NOTE ADULT - PROVIDER SPECIALTY LIST ADULT
Anticoag Management
Anticoag Management
Hospitalist
Infectious Disease
Infectious Disease
Internal Medicine
Orthopedics
Surgery
Surgery
Anticoag Management
Surgery

## 2018-05-24 NOTE — PROGRESS NOTE ADULT - SUBJECTIVE AND OBJECTIVE BOX
Patient is a 68y old  Female who presents with a chief complaint of Small Bowel Obstruction (21 May 2018 10:52)    Date of service: 05-24-18 @ 12:52  Patient feels good  ROS: no fever or chills; denies dizziness, no HA, no SOB or cough, no abdominal pain, no diarrhea or constipation; no dysuria, no urinary frequency, no legs pain, no rashes    MEDICATIONS  (STANDING):  cefTRIAXone Injectable. 1000 milliGRAM(s) IV Push every 24 hours  docusate sodium 100 milliGRAM(s) Oral three times a day  flavoxATE 100 milliGRAM(s) Oral four times a day  oxybutynin 5 milliGRAM(s) Oral three times a day  pantoprazole    Tablet 40 milliGRAM(s) Oral daily  polyethylene glycol 3350 17 Gram(s) Oral daily  rivaroxaban 10 milliGRAM(s) Oral daily    MEDICATIONS  (PRN):  acetaminophen   Tablet 650 milliGRAM(s) Oral every 6 hours PRN For Temp over 38.3 C (100.94 F)  aluminum hydroxide/magnesium hydroxide/simethicone Suspension 30 milliLiter(s) Oral four times a day PRN Indigestion  bisacodyl Suppository 10 milliGRAM(s) Rectal daily PRN If no bowel movement by POD#2  clonazePAM Tablet 0.5 milliGRAM(s) Oral two times a day PRN anxiety  diphenhydrAMINE   Capsule 25 milliGRAM(s) Oral at bedtime PRN Insomnia  HYDROmorphone  Injectable 1 milliGRAM(s) SubCutaneous every 4 hours PRN Severe Pain (7 - 10)  naloxone Injectable 0.1 milliGRAM(s) IV Push every 3 minutes PRN For ANY of the following changes in patient status:  A. RR LESS THAN 10 breaths per minute, B. Oxygen saturation LESS THAN 90%, C. Sedation score of 6  ondansetron Injectable 4 milliGRAM(s) IV Push every 6 hours PRN Nausea  ondansetron Injectable 4 milliGRAM(s) IV Push every 6 hours PRN Nausea and/or Vomiting  oxyCODONE    IR 5 milliGRAM(s) Oral every 4 hours PRN Mild Pain (1 - 3)  oxyCODONE    IR 10 milliGRAM(s) Oral every 4 hours PRN Moderate Pain (4 - 6)  senna 2 Tablet(s) Oral at bedtime PRN Constipation      Vital Signs Last 24 Hrs  T(C): 37.9 (23 May 2018 23:27), Max: 37.9 (23 May 2018 23:27)  T(F): 100.3 (23 May 2018 23:27), Max: 100.3 (23 May 2018 23:27)  HR: 92 (23 May 2018 23:27) (92 - 99)  BP: 126/58 (23 May 2018 23:27) (126/58 - 135/74)  BP(mean): --  RR: 16 (23 May 2018 23:27) (16 - 16)  SpO2: 95% (23 May 2018 23:27) (95% - 99%)    Physical Exam:          Constitutional: frail looking  HEENT: NC/AT, EOMI, PERRLA, conjunctivae clear; ears and nose atraumatic; pharynx clear  Neck: supple; thyroid not palpable  Respiratory: respiratory effort normal; clear to auscultation  Cardiovascular: S1S2 regular, no murmurs  Abdomen: soft, not tender, not distended, positive BS; no liver or spleen organomegaly; midline incision with ostomy bag over ostomy  Genitourinary: no suprapubic tenderness  Lymphatic: no LN palpable  Musculoskeletal: no muscle tenderness, no joint swelling or tenderness  Neurological/ Psychiatric: AxOx3, judgement and insight normal; right upper extremity in sling  Skin: no rashes; no palpable lesions    Labs: all available labs reviewed                       Labs:                        7.7    14.68 )-----------( 239      ( 24 May 2018 06:30 )             23.7     05-24    136  |  104  |  21  ----------------------------<  91  4.0   |  25  |  0.76    Ca    8.0<L>      24 May 2018 06:30             Cultures:          Cultures:             Radiology: all available radiological tests reviewed    Advanced directives addressed: full resuscitation

## 2018-05-24 NOTE — PROGRESS NOTE ADULT - ASSESSMENT
Patient is 67yo female with PMhx of Ovarian Ca s/p debulking surgery, s/p ileostomy, on chemo, HTN, HLD, admitted on 5/20 after mechanical fall at home sustaining right humeral head fracture and right femoral neck fracture s/p repair of right femoral neck fracture. Of note the patient has ostomy at site of incision from prior multiple surgeries and every once and a while gets a sharp pain then her urine will turn brown with particulate matter in it. 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 She is on PCA for orthopedic pain and also has intermittent suprapubic pain that is associated with dysuria.   1. 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 other issues: Ovarian Ca s/p debulking surgery, s/p ileostomy, on chemo, HTN, HLD  - per medicine

## 2018-05-24 NOTE — PROGRESS NOTE ADULT - ASSESSMENT
68F with R prox humerus fx and s/p R hip hemiarthroplasty POD 4  Analgesia  DVT ppx  Incentive spirometry  WBAT RLE, posterior hip precautions, abduction pillow  NWB RUE in sling, pendulum/codman exercises   P/T  FU CT abd/pel  Cont med management per primary team  Discharge planning  ortho stable for DC

## 2018-05-24 NOTE — PROGRESS NOTE ADULT - SUBJECTIVE AND OBJECTIVE BOX
Patient is 67yo female with PMhx of Ovarian Ca s/p debulking surgery, s/p ileostomy, on chemo, HTN, HLD, presents s/p fall at home. Recently the patient has had increased urgency/frequency, got up to go to the bathroom, tripped over pice of furniture, fell onto the R side of the body, hitting her head in the process, sustaining R humeral neck fracture, and R femoral neck fracture.     #Review of system- rest of review of systems are negative except as mentioned in HPI    PHYSICAL EXAM:      Vital Signs Last 24 Hrs  T(C): 37.4 (24 May 2018 13:47), Max: 37.9 (23 May 2018 23:27)  T(F): 99.3 (24 May 2018 13:47), Max: 100.3 (23 May 2018 23:27)  HR: 98 (24 May 2018 13:47) (88 - 99)  BP: 144/65 (24 May 2018 13:47) (126/58 - 148/69)  BP(mean): --  RR: 16 (24 May 2018 13:47) (16 - 16)  SpO2: 98% (24 May 2018 13:47) (95% - 99%)    GENERAL: comfortable   HEAD:  Atraumatic, Normocephalic  EYES: EOMI, PERRLA, conjunctiva and sclera clear  HEENT: Moist mucous membranes  NECK: Supple, No JVD  NERVOUS SYSTEM:  Alert & Oriented X3,   CHEST/LUNG: Clear to auscultation bilaterally; No rales, rhonchi, wheezing, or rubs  HEART:S1S2 normal, no murmer  ABDOMEN: Soft, Nontender, Nondistended; Bowel sounds present  GENITOURINARY- Voiding, no palpable bladder  EXTREMITIES:  2+ Peripheral Pulses, No clubbing, cyanosis, or edema  MUSCULOSKELETAL No muscle tenderness, Muscle tone normal,   SKIN-no rash, no lesion  PSYCH- Mood stable  LYMPH Node- No palpable lymph node

## 2018-05-24 NOTE — PROGRESS NOTE ADULT - ASSESSMENT
A 67 yo female With H/O Ovarian Ca, S/P hartmanns procedure in the past, has Ileostomy, fell at her home, fx her right humerus and her right femoral head, now S/P right THR, Right humerus Non-op, high thrombosis risk. Pt transferred to  today. Discussed risks/benefits of Xarelto.    PLAN:   cont Xarelto 10 mg po daily x 35 days (5/21/18 to 6/11/18)   daily cbc, BMP  maintain b/l venodynes  INC mobility per ortho and PT    Will continue to follow A 67 yo female With H/O Ovarian Ca, S/P hartmanns procedure in the past, has Ileostomy, fell at her home, fx her right humerus and her right femoral head, now S/P right THR, Right humerus Non-op, high thrombosis risk. Pt transferred to  today. Discussed risks/benefits of Xarelto.    PLAN: being transfused today  cont Xarelto 10 mg po daily x 35 days (5/21/18 to 6/11/18)   daily cbc, BMP  maintain b/l venodynes  INC mobility per ortho and PT    Will continue to follow

## 2018-05-24 NOTE — PROGRESS NOTE ADULT - SUBJECTIVE AND OBJECTIVE BOX
HPI: Patient is 67yo female with PMhx of Ovarian Ca s/p debulking surgery, s/p ileostomy, on chemo, HTN, HLD, presents s/p fall at home. Recently the patient has had increased urgency/frequency, got up to go to the bathroom, tripped over piece of furniture, fell onto the R side of the body, hitting her head in the process, sustaining R humeral neck fracture, and R femoral neck fracture. Pt denies any cardiac history, denies CP, SOB. METs>4, has never had cardiac issues, previously tolerated surgeries under general anesthesia without any issues. (20 May 2018 10:19)    PT now is SD, S/P right THR, Non-OP right humerus fx    Consulted by Dr. Strong  for VTE prophylaxis, risk stratification, and anticoagulation management.    PAST MEDICAL & SURGICAL HISTORY:  Perforation of sigmoid colon: s/p Reymundo procedure 4/2016  Ovarian cancer  Other acute pulmonary embolism  Essential hypertension, hypertension with unspecified goal  Malignant neoplasm of ovary, unspecified laterality  History of conization of cervix: 1984  Uterus disorder: removed in 2003, due to fibroids  H/O skin graft: to abdominal wound (11/2016)  S/P cholecystectomy: 2011  H/O abdominal hysterectomy: removal of ovaries, tubes ,omentum,radical debulking  Status post Reymundo procedure: April 2016    CrCl: 31.3    Caprini VTE Risk Score: #9      IMPROVE Bleeding Risk Score #4.5    Falls Risk:   High (x  )  Mod (  )  Low (  )      FAMILY HISTORY:  No pertinent family history in first degree relatives  No pertinent family history in first degree relatives    Denies any personal or familial history of clotting or bleeding disorders.    Allergies    Bananas (Unknown)  ibuprofen (Other)  Lexapro (Unknown)  Originally Entered as [Unknown] reaction to [cantelope] (Unknown)  Originally Entered as [Unknown] reaction to [cherries] (Unknown)  Peaches (Unknown)  wallnuts, paches, bananas, honedew and cantalope (Hives; Short breath)  walnut (Stomach Upset (Severe))  Wellbutrin (Hives)  Intolerances    carboplatin (Flushing (Skin); Rash)      REVIEW OF SYSTEMS    (  )Fever	     (  )Constipation	(  )SOB				(  )Headache	(  )Dysuria  (  )Chills	     (  )Melena	(  )Dyspnea present on exertion	                    (  )Dizziness                    (  )Polyuria  (  )Nausea	     (  )Hematochezia	(  )Cough			                    (  )Syncope   	(  )Hematuria  (  )Vomiting    (  )Chest Pain	(  )Wheezing			(  )Weakness  (  )Diarrhea     (  )Palpitations	(  )Anorexia			(  )Myalgia    All other review of systems negative: Yes    PHYSICAL EXAM:    Constitutional: Appears Well    Neurological: A& O x 3    Skin: Warm    Respiratory and Thorax: normal effort; Breath sounds: normal; No rales/wheezing/rhonchi  	  Cardiovascular: S1, S2, regular, NMBR	MP ST    Gastrointestinal: BS + x 4Q, nontender, Ileostomy draining liquid brown drainage    Genitourinary:  Bladder nondistended, nontender    Musculoskeletal:   General Right:   + muscle/joint tenderness,   normal tone, no joint swelling,   ROM: limited, abduction pillow in place	    General Left:   no muscle/joint tenderness,   normal tone, no joint swelling,   ROM: full    Hip:  Right: Dressing CDI;   Right arm: In sling: fingers warm and mobile, radial pulse +. intact sensation, + cap refill              Lower extrems:   Right: no calf tenderness              negative anselmo's sign               + pedal pulses    Left:   no calf tenderness              negative anselmo's sign               + pedal pulses                          7.7    14.68 )-----------( 239      ( 24 May 2018 06:30 )             23.7       05-24    136  |  104  |  21  ----------------------------<  91  4.0   |  25  |  0.76    Ca    8.0<L>      24 May 2018 06:30                                  8.8    18.46 )-----------( 192      ( 23 May 2018 05:07 )             27.7       05-23    137  |  105  |  24<H>  ----------------------------<  104<H>  4.2   |  26  |  0.89    Ca    8.0<L>      23 May 2018 05:07                          7.5    25.01 )-----------( 203      ( 22 May 2018 06:22 )             23.4       05-22    139  |  107  |  24<H>  ----------------------------<  131<H>  4.1   |  23  |  0.90    Ca    7.9<L>      22 May 2018 06:22    PT/INR - ( 20 May 2018 05:20 )   PT: 11.9 sec;   INR: 1.10 ratio    PTT - ( 20 May 2018 05:20 )  PTT:26.7 sec				    Ct head:  Neg for bleed    MEDICATIONS  (STANDING):  cefTRIAXone Injectable. 1000 milliGRAM(s) IV Push every 24 hours  docusate sodium 100 milliGRAM(s) Oral three times a day  flavoxATE 100 milliGRAM(s) Oral four times a day  oxybutynin 5 milliGRAM(s) Oral three times a day  pantoprazole    Tablet 40 milliGRAM(s) Oral daily  polyethylene glycol 3350 17 Gram(s) Oral daily  rivaroxaban 10 milliGRAM(s) Oral daily    DVT Prophylaxis:  LMWH                   (  )  Heparin SQ           (  )  Coumadin             (  )  Xarelto                  ( x )  Eliquis                   (  )  Venodynes           ( x )  Ambulation          ( x )  UFH                       (  )  Contraindicated  (  )

## 2018-05-24 NOTE — PROGRESS NOTE ADULT - SUBJECTIVE AND OBJECTIVE BOX
Pt S/E at bedside, no acute events overnight, pain controlled. Pt going for CT abd/pel today to further evaluate fistula. No complaints.    Vital Signs Last 24 Hrs  T(C): 37.9 (23 May 2018 23:27), Max: 37.9 (23 May 2018 23:27)  T(F): 100.3 (23 May 2018 23:27), Max: 100.3 (23 May 2018 23:27)  HR: 92 (23 May 2018 23:27) (92 - 125)  BP: 126/58 (23 May 2018 23:27) (119/58 - 139/64)  BP(mean): 82 (23 May 2018 10:00) (73 - 82)  RR: 16 (23 May 2018 23:27) (7 - 17)  SpO2: 95% (23 May 2018 23:27) (94% - 99%)    Gen: NAD, AAOx3    Right Lower Extremity:  Dressing clean dry intact  +EHL/FHL/TA/GS  SILT L3-S1  +DP/PT Pulses  Compartments soft  No calf TTP B/L     Right Upper Extremity:  Skin intact, +sling  +AIN/PIN/M/R/U/Msc/Ax  SILT C5-T1  +Radial Pulse  Compartments soft  No calf TTP B/L

## 2018-05-25 LAB — SURGICAL PATHOLOGY FINAL REPORT - CH: SIGNIFICANT CHANGE UP

## 2018-05-29 DIAGNOSIS — Z86.711 PERSONAL HISTORY OF PULMONARY EMBOLISM: ICD-10-CM

## 2018-05-29 DIAGNOSIS — D62 ACUTE POSTHEMORRHAGIC ANEMIA: ICD-10-CM

## 2018-05-29 DIAGNOSIS — N39.0 URINARY TRACT INFECTION, SITE NOT SPECIFIED: ICD-10-CM

## 2018-05-29 DIAGNOSIS — Z90.722 ACQUIRED ABSENCE OF OVARIES, BILATERAL: ICD-10-CM

## 2018-05-29 DIAGNOSIS — Z93.2 ILEOSTOMY STATUS: ICD-10-CM

## 2018-05-29 DIAGNOSIS — N32.1 VESICOINTESTINAL FISTULA: ICD-10-CM

## 2018-05-29 DIAGNOSIS — Z92.21 PERSONAL HISTORY OF ANTINEOPLASTIC CHEMOTHERAPY: ICD-10-CM

## 2018-05-29 DIAGNOSIS — Z79.01 LONG TERM (CURRENT) USE OF ANTICOAGULANTS: ICD-10-CM
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
## 2018-05-29 DIAGNOSIS — S72.091A OTHER FRACTURE OF HEAD AND NECK OF RIGHT FEMUR, INITIAL ENCOUNTER FOR CLOSED FRACTURE: ICD-10-CM

## 2018-05-29 DIAGNOSIS — I10 ESSENTIAL (PRIMARY) HYPERTENSION: ICD-10-CM
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
## 2018-05-29 DIAGNOSIS — C56.9 MALIGNANT NEOPLASM OF UNSPECIFIED OVARY: ICD-10-CM

## 2018-05-29 DIAGNOSIS — Z90.710 ACQUIRED ABSENCE OF BOTH CERVIX AND UTERUS: ICD-10-CM

## 2018-05-29 DIAGNOSIS — Z90.49 ACQUIRED ABSENCE OF OTHER SPECIFIED PARTS OF DIGESTIVE TRACT: ICD-10-CM

## 2018-05-29 DIAGNOSIS — E78.5 HYPERLIPIDEMIA, UNSPECIFIED: ICD-10-CM

## 2018-05-29 DIAGNOSIS — S42.251A DISPLACED FRACTURE OF GREATER TUBEROSITY OF RIGHT HUMERUS, INITIAL ENCOUNTER FOR CLOSED FRACTURE: ICD-10-CM

## 2018-07-12 NOTE — CONSULT NOTE ADULT - PROBLEM SELECTOR RECOMMENDATION 3
NGT decompression
Coronary artery disease of native artery of native heart with stable angina pectoris

## 2018-07-16 PROBLEM — K63.1 PERFORATION OF INTESTINE (NONTRAUMATIC): Chronic | Status: ACTIVE | Noted: 2017-04-10

## 2018-07-25 NOTE — H&P PST ADULT - HEMATOLOGY/LYMPHATICS
1.  EGD/colonoscopy under MAC to r/o PUD, gastritis, IBD such as Crohn's disease etc.   2.  Start bowel prep today  3.  Clear liquid diet today  4.  NPO after midnight.  5.  Continue PPI once a day  6.  Antispasmodics
details…

## 2018-07-30 ENCOUNTER — APPOINTMENT (OUTPATIENT)
Dept: GYNECOLOGIC ONCOLOGY | Facility: CLINIC | Age: 68
End: 2018-07-30
Payer: MEDICARE

## 2018-07-30 VITALS
BODY MASS INDEX: 18.27 KG/M2 | DIASTOLIC BLOOD PRESSURE: 60 MMHG | WEIGHT: 107 LBS | HEIGHT: 64 IN | SYSTOLIC BLOOD PRESSURE: 95 MMHG

## 2018-07-30 DIAGNOSIS — C56.9 MALIGNANT NEOPLASM OF UNSPECIFIED OVARY: ICD-10-CM

## 2018-07-30 DIAGNOSIS — N32.2 VESICAL FISTULA, NOT ELSEWHERE CLASSIFIED: ICD-10-CM

## 2018-07-30 DIAGNOSIS — C78.6 SECONDARY MALIGNANT NEOPLASM OF RETROPERITONEUM AND PERITONEUM: ICD-10-CM

## 2018-07-30 PROCEDURE — 99214 OFFICE O/P EST MOD 30 MIN: CPT

## 2018-07-31 PROBLEM — C78.6 METASTASIS TO PERITONEUM: Status: ACTIVE | Noted: 2017-05-05

## 2018-07-31 PROBLEM — N32.2 BLADDER FISTULA: Status: ACTIVE | Noted: 2018-01-25

## 2018-09-04 ENCOUNTER — INBOUND DOCUMENT (OUTPATIENT)
Age: 68
End: 2018-09-04

## 2018-09-18 ENCOUNTER — INBOUND DOCUMENT (OUTPATIENT)
Age: 68
End: 2018-09-18

## 2018-09-19 NOTE — ED ADULT NURSE NOTE - PRO INTERPRETER NEED 2
This medication is for anxiety and has been prescribed by patient's PCP for 2 years.  She should contact Dr. Saucedo for a RF.   English

## 2018-10-16 ENCOUNTER — INBOUND DOCUMENT (OUTPATIENT)
Age: 68
End: 2018-10-16

## 2018-10-17 ENCOUNTER — OTHER (OUTPATIENT)
Age: 68
End: 2018-10-17

## 2018-10-29 ENCOUNTER — APPOINTMENT (OUTPATIENT)
Dept: GYNECOLOGIC ONCOLOGY | Facility: CLINIC | Age: 68
End: 2018-10-29

## 2019-01-01 NOTE — DISCHARGE NOTE ADULT - NS DC ANGIO PCI YN
Baby girl born at 38.4 wks via  to 31 yo , O+ blood type mother. Maternal history significant for thyroid cancer s/p thyroidectomy, on levothyroxine. Prenatal history not significant. PNL nr/immune/neg. GBS - on . SROM at 0230, clear fluid, rom 14hrs. Baby emerged vigorous and crying; was w/d/s/s with APGARs of 9/9. Mom would like to breast/bottle feed,  no to Hep B. EOS 0.1.    Since admission to the NBN, baby has been feeding well, stooling and making wet diapers. Vitals have remained stable. Baby received routine  care. Bilirubin was __ at __ hours of life, which is __ risk zone. Baby lost an acceptable amount of weight, down __% from birth weight.     See below for CCHD, auditory screening, and Hepatitis B vaccine status.  Patient is stable for discharge to home after receiving routine  care education and instructions to follow up with pediatrician appointment in 1-2 days. no Baby girl born at 38.4 wks via  to 31 yo , O+ blood type mother. Maternal history significant for thyroid cancer s/p thyroidectomy, on levothyroxine. Prenatal history not significant. PNL nr/immune/neg. GBS - on . SROM at 0230, clear fluid, rom 14hrs. Baby emerged vigorous and crying; was w/d/s/s with APGARs of 9/9. Mom would like to breast/bottle feed,  no to Hep B. EOS 0.1.    Since admission to the NBN, baby has been feeding well, stooling and making wet diapers. Vitals have remained stable. Baby received routine  care. Bilirubin was 7.4 at 36 hours of life, which is low intermediate risk zone. Baby lost an acceptable amount of weight, down 5.10% from birth weight.     See below for CCHD, auditory screening, and Hepatitis B vaccine status.  Patient is stable for discharge to home after receiving routine  care education and instructions to follow up with pediatrician appointment in 1-2 days. Baby girl born at 38.4 wks via  to 33 yo , O+ blood type mother. Maternal history significant for thyroid cancer s/p thyroidectomy, on levothyroxine. Prenatal history not significant. PNL nr/immune/neg. GBS - on . SROM at 0230, clear fluid, rom 14hrs. Baby emerged vigorous and crying; was w/d/s/s with APGARs of 9/9. Mom would like to breast/bottle feed,  no to Hep B. EOS 0.1.    Since admission to the NBN, baby has been feeding well, stooling and making wet diapers. Vitals have remained stable. Baby received routine  care. Bilirubin was 7.4 at 36 hours of life, which is low intermediate risk zone. Baby lost an acceptable amount of weight, down 5.10% from birth weight.     See below for CCHD, auditory screening, and Hepatitis B vaccine status.  Patient is stable for discharge to home after receiving routine  care education and instructions to follow up with pediatrician appointment in 1-2 days.      Attending Discharge Exam:    General: alert, awake, good tone, pink   HEENT: very small anterior fontanelle, Eyes: Red light reflex positive bilaterally, Ears: normal set bilaterally, No anomaly, Nose: patent, Throat: clear, no cleft lip or palate, Tongue: normal Neck: clavicles intact bilaterally  Lungs: Clear to auscultation bilaterally, no wheezes, no crackles  CVS: S1,S2 normal, no murmur, femoral pulses palpable bilaterally  Abdomen: soft, no masses, no organomegaly, not distended  Umbilical stump: intact, dry  : donnie 1, patent anus  Extremities: FROM x 4, no hip clicks bilaterally  Skin: intact, no rashes, capillary refill < 2 seconds  Neuro: symmetric sugn reflex bilaterally, good tone, + suck reflex, + grasp reflex    AF 1mm in size - likely d/t overlapping sutures - infant otherwise well appearing - monitor improvement in size as sutures open up -- f/u at PMD office  I saw and examined this baby for discharge. Tolerating feeds well.  Please see above for discharge weight and bilirubin.  I reviewed baby's vitals prior to discharge.  Baby's Hearing test results, Hepatitis B vaccine status, Congenital Heart Screen Results, and Hospital course reviewed.  Anticipatory guidance discussed with mother: cord care, car safety, crib safety (Back to sleep), Tummy time, Rectal temp  >100.4 = fever = if baby is less than 2 months of age: Call Pediatrician immediately or bring baby to closest ER     Baby is stable for discharge and will follow up with PMD in 1-2 days after discharge  I spent > 30 minutes with the patient and the patient's family on direct patient care and discharge planning.     Shilpa Brower MD

## 2019-09-02 NOTE — ED PROVIDER NOTE - PROGRESS NOTE DETAILS
no JW As the supervising resident-in-charge (DONNIE), I have performed a face to face bedside history and physical examination of this patient. I have discussed the history, examination, review of systems, assessment and plan of management with the archie resident. I have reviewed the electronic medical record and amended it to reflect my history, review of systems, physical exam, assessment and plan. HUBERT PT evaluated by Dr. Zhou.  Discussed with surgery. 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  Requested wound care consult and placement of new ostomy on site.  Contacted wound care, nurse in route. RYAN Tobias: Wound care nurse en route to see patient. Will place ostomy on site as per Dr. Zhou site is now a new percutaneous ostomy site.

## 2020-07-30 NOTE — ED PROVIDER NOTE - ABDOMINAL EXAM
done
tender.../DISTENDED/pt withdistended tender abdomen  abd hyperresonant  ostomy withsmall amount of material

## 2020-09-16 NOTE — H&P PST ADULT - OPHTHALMOLOGIC
Subjective   Patient ID: Alisa is a 55 year old female.    Chief Complaint   Patient presents with   • Office Visit     no concerns at this time   • Annual Exam     yearly check up     Wellness physical examination  Anxiety screen done  Depression screen done  Sleep apnea screen done  Fall prevention screen done  Mini cognition screen done  Chronic care management for hypothyroidism allergic rhinitis, asthma.  Current medications reviewed      Patient's medications, allergies, past medical, surgical, social and family histories were reviewed and updated as appropriate.    Review of Systems   Constitutional: Negative.    HENT: Negative.    Eyes: Negative.    Respiratory: Negative.    Cardiovascular: Negative.    Gastrointestinal: Negative.    Endocrine: Negative.    Genitourinary: Negative.    Musculoskeletal: Negative.    Skin: Negative.    Allergic/Immunologic: Positive for environmental allergies.   Hematological: Negative.    Psychiatric/Behavioral: Negative.    All other systems reviewed and are negative.      Objective   Physical Exam  Vitals signs and nursing note reviewed.   Constitutional:       Appearance: She is well-developed.   HENT:      Head: Normocephalic and atraumatic.      Right Ear: External ear normal.      Left Ear: External ear normal.      Nose: Nose normal.   Eyes:      Conjunctiva/sclera: Conjunctivae normal.      Pupils: Pupils are equal, round, and reactive to light.   Neck:      Musculoskeletal: Normal range of motion.   Cardiovascular:      Rate and Rhythm: Normal rate and regular rhythm.      Heart sounds: Normal heart sounds.   Pulmonary:      Effort: Pulmonary effort is normal.      Breath sounds: Normal breath sounds.   Abdominal:      General: Bowel sounds are normal.      Palpations: Abdomen is soft.   Musculoskeletal: Normal range of motion.   Skin:     General: Skin is warm.   Neurological:      Mental Status: She is alert.   Psychiatric:         Behavior: Behavior normal.          Thought Content: Thought content normal.         Judgment: Judgment normal.         Assessment   Problem List Items Addressed This Visit        Behavioral    Generalized anxiety disorder - Primary     Psychological condition is improving with treatment.  Continue current treatment regimen.  Regular aerobic exercise.  Psychological condition will be reassessed in 3 months.            Nervous    Tobacco dependency     Tobacco use is unchanged.  Smoking cessation counseling was provided.  Tobacco use will be reassessed in 3 months.            Respiratory    Asthma, stable     Asthma is improving with treatment.  The patient is experiencing no daytime asthma symptoms. She is experiencing no nighttime asthma symptoms.  Personalized, written asthma management plan given.  Asthma information handout given.  Patient to keep asthma diary.  Discussed monitoring symptoms and use of quick-relief medications and contacting us early in the course of exacerbations.             Relevant Medications    budesonide-formoterol (SYMBICORT) 80-4.5 MCG/ACT inhaler    Seasonal allergic rhinitis     stable            Digestive    Gastro-esophageal reflux disease without esophagitis     No symptoms            Endocrine    Hypothyroidism, adult     Lab ordered         Relevant Orders    CBC WITH DIFFERENTIAL    COMPREHENSIVE METABOLIC PANEL    GLYCOHEMOGLOBIN    HEPATITIS C ANTIBODY WITH REFLEX    LIPID PANEL WITH REFLEX    URIC ACID    VITAMIN B12    VITAMIN D -25 HYDROXY    THYROID STIMULATING HORMONE    URINALYSIS WITH MICROSCOPY EXAM W/O C/S    Hypercholesterolemia    Relevant Orders    CBC WITH DIFFERENTIAL    COMPREHENSIVE METABOLIC PANEL    GLYCOHEMOGLOBIN    HEPATITIS C ANTIBODY WITH REFLEX    LIPID PANEL WITH REFLEX    URIC ACID    VITAMIN B12    VITAMIN D -25 HYDROXY    THYROID STIMULATING HORMONE    URINALYSIS WITH MICROSCOPY EXAM W/O C/S      Other Visit Diagnoses     Annual physical exam        Relevant Orders    CBC WITH  DIFFERENTIAL    COMPREHENSIVE METABOLIC PANEL    GLYCOHEMOGLOBIN    HEPATITIS C ANTIBODY WITH REFLEX    LIPID PANEL WITH REFLEX    URIC ACID    VITAMIN B12    VITAMIN D -25 HYDROXY    THYROID STIMULATING HORMONE    URINALYSIS WITH MICROSCOPY EXAM W/O C/S    Flu vaccine need        Relevant Orders    INFLUENZA QUADRIVALENT SPLIT 0.5 ML VACC MULTIDOSE, IM (FLUAVAL,FLUZONE)    INFLUENZA QUADRIVALENT SPLIT 0.5 ML VACC MULTIDOSE, IM (FLUAVAL,FLUZONE)    Well woman exam (no gynecological exam)        Relevant Orders    SERVICE TO OB GYN    Visit for screening mammogram        Relevant Orders    MAMMO SCREENING BILATERAL    Post-menopausal        Relevant Orders    BD DEXA AXIAL SKELETON    Need for pneumococcal vaccination        Relevant Orders    PNEUMOCOCCAL POLYSACCHARIDE ADULT VACC, IM/SQ (PNEUMOVAX 23)    Need for shingles vaccine        Relevant Orders    ZOSTER SHINGLES RECOMBINANT ADJUVANTED IM(SHINGRIX)    Need for Tdap vaccination        Relevant Orders    TETANUS DIPHTHERIA ACELLULAR PERTUSSIS VACC, 11+ YRS (ADACEL)    Colon cancer screening        Relevant Orders    SERVICE TO GASTROENTEROLOGY          Pap smears and HPV testing as according to ACOG guidelines.  Mammograms yearly  Fasting lipid panel and glucose  Also recommend the following:  Healthy eating habits, Continue to exercise regularly, Increase exercise, Adequate intake of dietary calcium, Breast awareness and STD screening with each new partner or potential exposure  Continue with yearly breast and pelvic exams.  Continue with breast and pelvic exams every 1-2 years  DEXA scan  Thyroid studies  Referral for colonoscopy    Health Maintenance Summary     Pneumococcal Vaccine 0-64 (1 of 1 - PPSV23)  Overdue since 2/16/1969    DTaP/Tdap/Td Vaccine (1 - Tdap)  Order placed this encounter    Cervical Cancer Screening HPV CO-Testing (Every 5 Years)  Overdue since 2/16/1993    Colorectal Cancer Screening-Colonoscopy (Every 10 Years)  Overdue since  2/16/2013    Shingles Vaccine (1 of 2)  Order placed this encounter    Hepatitis C Screening (Once)  Order placed this encounter    Influenza Vaccine (1)  Due since 9/1/2020    Breast Cancer Screening (Every 2 Years)  Order placed this encounter    Depression Screening (Yearly)  Next due on 9/16/2021    Hepatitis B Vaccine   Aged Out    Meningococcal Vaccine   Aged Out    HPV Vaccine   Aged Out          PLAN    Counseled on safety practices, safe sex, protection as it applies to you.  Self breast examination  ( applies to females and males in certain situations) taught and recommended .  Counseled on alcohol usage and associated health risks and benefits.  Consult on illicit drug usage and associated health risks.  Counseled on tobacco education done.  Medical compliance with the plan discussed and risk of noncompliance discussed.  Cancer screening recommendations discussed ; breast and cervical, prostate, colon(gender specific)  Preventive diet and exercise related, lifestyle changes discussed and recommended.  Weight goals reviewed and discussed in detail.  Injury prevention counseling completed, seatbelts, helmets, smoke detectors, safe to storage of firearms, Internet safety, sun safety discussed.  Counseled to reduce caffeine intake.  Patient education completed on disease process, etiology, prognosis.  Patient expressed understanding of the plan.  Proper usage and side effect of medications reviewed and discussed.    Cardiovascular screening and counseling, the risk and benefits of screening were discussed, counseling was given on maintaining a healthy diet, counseling was given on maintaining a healthy weight, counseling was given on ways to improve cholesterol, counseling was given on ways to improve blood pressure and check lipid panel as advised.    Diabetes screening and counseling, risk and benefits of screening were discussed, counseling was given on maintaining a healthy diet, counseling was given on  maintaining a healthy weight, periodic blood sugar check as advised.    Colorectal cancer screening and counseling, the risk and benefits of screening were discussed and counseling was given on ways to eat high-fiber diet.    Breast cancer screening and counseling, risk and benefits of screening were discussed and screening mammogram as well as follow-up and self breast examination discussed as well.    Cervical cancer screening and counseling, risk and benefits of screening were discussed.    Osteoporosis screening and counseling, risk and benefits of screening were discussed, counseling was given on obtaining adequate amount of calcium and vitamin D on a daily basis, counseling was given on importance of regular weightbearing exercises and DEXA scan as advised.    Glaucoma screening and counseling, risk and benefits of screening were discussed and ophthalmology consultation advised/optometry consultation.    HIV screening and counseling, risk and benefits of screening were discussed and patient has declined screening.    Immunizations, risks and benefits of influenza vaccination were discussed with the patient.  Influenza vaccination is recommended annually, risk and benefits of pneumococcal vaccination were discussed with the patient, hepatitis B prevention counseling was provided, risk and benefits of hepatitis vaccination series were discussed with the patient, risk and benefits of Zostavax vaccine were discussed with the patient, the risk and benefits of Tdap vaccination were discussed with the patient and risk and benefits of Td vaccination discussed with the patient.      Return to clinic as clinically indicated as discussed with patient who verbalized understanding of and in agreement with the plan.  Refer to orders.  Total time of encounter was 60 minutes and 35 minutes was spent counseling.  Greater than 50% of total time was spent counseling and coordinating care, face-to-face activities to coordinate  care.  Schedule follow up: in 2 weeks   details…

## 2020-11-30 NOTE — DISCHARGE NOTE ADULT - CARE PROVIDERS DIRECT ADDRESSES
Final Anesthesia Post-op Assessment    Patient: Anjana Blandon  Procedure(s) Performed: LAPAROSCOPIC ASSISTED EXTENDED RIGHT COLECTOMY - RIGHTCOLONOSCOPY  Anesthesia type: General    Vitals Value Taken Time   Temp 36.7 11/30/20 1539   Pulse 61 11/30/20 1535   Resp 20 11/30/20 1535   SpO2 96 % 11/30/20 1535   /57 11/30/20 1535            No complications documented. Anesthesia Post-op Note      Patient: Anjana Blandon        Mental status recovered: patient participates in evaluation.    VS noted above and are stable.    Respiratory function satisfactory; airway patent.    Cardiovascular function and hydration status satisfactory.    Adequate pain control.    Nausea and vomiting control satisfactory.    No apparent anesthetic complications.       [email or web address]

## 2020-12-16 PROBLEM — Z87.440 HISTORY OF URINARY TRACT INFECTION: Status: RESOLVED | Noted: 2017-08-02 | Resolved: 2020-12-16

## 2021-12-22 NOTE — ED ADULT TRIAGE NOTE - PAIN RATING/NUMBER SCALE (0-10): REST
Referred by: Vanna Regan MD; Medical Diagnosis (from order):    Diagnosis Information      Diagnosis    723.4 (ICD-9-CM) - M54.12 (ICD-10-CM) - Cervical radiculopathy    729.1 (ICD-9-CM) - M79.7 (ICD-10-CM) - Fibromyalgia                Daily Treatment Note    Visit:  4     SUBJECTIVE                                                                                                               12/22/21: Pt reports that she is feeling better overall.     12/13/21: Pt reports that she has had better day as of late, but did have one bad day but she attributes that to weather-related arthritis.     12/6/21: Pt reports compliance with HEP, although the doorway stretch was painful so she stopped performing it. Therapist advised and replaced that as part of HEP.    11/29/21 Initial evaluation: (pt arrived 15 min late - some test may be administered next visit as a result) Pt had cervical fusion and R rotator cuff repair in last 2 years. Pt had therapy at this clinic, which helped. Pt is having more discomfort in thoracic spine than her cervical spine and is noting more edema in her hands, although sensation has remained stable.   Pain / Symptoms:  Pain/symptom is: constant  Pain rating (out of 10): Current: 3     OBJECTIVE                                                                                                                     Observation:   Comments / Details: Improved posture, without cueing      TREATMENT                                                                                                                initial evaluation completed    Therapeutic Exercise:  + = added/performed  - = deleted/not performed  TB = Theraband  DB = Dumbbell  +UBE warm-up 2.5 min forward and 2.5 min backward  -Supine transverse abdominal holds VS Therapist  -Supine transverse abdominal holds with alternating leg lifts  -Supine transverse abdominal holds with alternating leg extensions  -Dead bug   -Seated transverse  abdominal holds with alternating leg lifts  -Seated transverse abdominal holds with opposite leg and arm lifts  -Standing with aerobics ball on wall transverse abdominal holds with alternating leg lifts  +Pulley low rows and shoulder extensions 10# 10 x 3 each  +Fany step away's (backward, forward L, forward R) 10#*  +Pulley push-away 10# each leg*   +Stick push/pull holds  +\"Lawnmower walk\"  +Serratus punches with green TB 10 x 3*             Manual Therapy:  Myofascial Release = MFR  Soft tissue massage = STM  Upper trap = UT  Levator = Lev  + = added/performed  - = deleted/not performed  +Prone Thoracic P/A Grade 2-3  +Prone Thoracic paraspinal MFR, bilateral      Neuromuscular Re-Education:  + = added/performed  - = deleted/not performed        Skilled input: verbal instruction/cues    Writer verbally educated and received verbal consent for hand placement, positioning of patient, and techniques to be performed today from patient for clothing adjustments for techniques, therapist position for techniques and hand placement and palpation for techniques as described above and how they are pertinent to the patient's plan of care.    Home Exercise Program/Education Materials: Access Code: [email or web address]  URL: [email or web address]  Date: 12/22/2021  Prepared by: Vishnu Hurtado    Exercises  Cervical Extension AROM with Strap - 3 x daily - 7 x weekly - 10 reps  Standing Bicep Stretch at Wall - 3 x daily - 7 x weekly - 60-90 hold  Standing Shoulder Horizontal Abduction with Resistance - 1 x daily - 7 x weekly - 3 sets - 10 reps  Shoulder External Rotation and Scapular Retraction with Resistance - 1 x daily - 7 x weekly - 3 sets - 10 reps  Squatting Anti-Rotation Press - 1 x daily - 7 x weekly - 3 sets - 10 reps  Standing Serratus Punch with Resistance - 1 x daily - 7 x weekly - 3 sets - 10 reps         ASSESSMENT                                                                                                              12/22/21: Pt continues to improve scapular and core stability. Updated HEP today. Will continue to progress POC.    12/13/21: Pt continues to have palpable tension throughout thoracic spine, but is demonstrated improved AROM and posture. Continue to progress stabilization activities.     12/6/21: Pt reported decrease in pain after manual therapy. Pt verbalized understanding of HEP changes and was able to replicate core exercise without c/o pain.     Initial evaluation: Pt has mechanical difficulties with cervical and thoracic spine from compensations after two upper quadrant surgeries. Pt has struggles with cervical and thoracic mobility in the past and has noted improvement with exercise. Pt has tight paraspinals and poor scapulohumeral rhythm , which limits her movement and she compensates as a result. The patient will benefit from skilled PT intervention with focus on patient education, therapeutic exercise, and manual therapy techniques. Failure to intervene may lead to permanent function loss and could increase chronicity of this disorder, impeding upon pt's overall function and quality of life.    Pain/symptoms after session (out of 10): 3 and 2  Patient Education:   Results of above outlined education: Demonstrates understanding and Verbalizes understanding      PLAN                                                                                                                           Suggestions for next session as indicated: Progress per plan of care      GOALS                                                                                                                           Long Term Goals: to be met by end of plan of care  1. Pt to bend cervical spine enough to look down and scan environment consistent with household activities without pain limiting her.  2. Pt to sit/stand to work on computer/screen prn daily without pain limiting her.  3. Pt to sing prn daily for leisure  activities without thoracic impairment.       Therapy procedure time and total treatment time can be found documented on the Time Entry flowsheet   10

## 2022-01-19 NOTE — DISCHARGE NOTE ADULT - CARE PLAN
Apixaban/Eliquis is used to treat and prevent blood clots. If you are not able to swallow the tablets whole, they may be crushed and mixed in water, apple juice, or applesauce and promptly taken within four hours. Never skip a dose of Apixaban/Eliquis. If you forget to take your Apixaban/Eliquis, take a dose as soon as you remember. If it is almost time for your next Apixaban/Eliquis dose, wait until then and take a regular dose. DO NOT take an extra pill to ‘catch up’.  NEVER TAKE A DOUBLE DOSE. Notify your doctor that you missed a dose. Take Apixaban/Eliquis at the same time each morning and evening. Apixaban/Eliquis may be taken with other medication or food. Principal Discharge DX:	Small bowel obstruction due to adhesions  Goal:	resume diet  Instructions for follow-up, activity and diet:	monitor  Secondary Diagnosis:	Malignant neoplasm of ovary, unspecified laterality  Goal:	f/u oncology  Secondary Diagnosis:	Essential hypertension, hypertension with unspecified goal

## 2022-03-03 NOTE — ED ADULT NURSE NOTE - BP NONINVASIVE DIASTOLIC (MM HG)
Called patient and explained the need to stay within [facility] to obtain the PT care she is requesting. Patient stated she will call insurance to confirm that information given is accurate. Questions encouraged and answered. Patient verbalized understanding of information given.  
From: Kristina Delong  To: Martina Everett  Sent: 3/3/2022 11:29 AM CST  Subject: PT referral to a different clinic    Dear Dr. Mack,  I hope you are doing well. I went to the dirt PT appointment for my coccyx at [facility] today. Looks like they will continue with the sessions every week. I was wondering if you can refer me to a more convenient location. There is a [facility] on [address] [place] [address] or an [facility] on [facility]. Both will be covered by our insurance if there is a referral from the primary. Rock went to [facility] before; June went to [place] and all the sessions were covered. [place] would be closer to my house.  If you can do another referral, I’d either come and get the physical copy from the  or I can maybe use the electronic copy.    Please let me know what you think,  Best   vesna   
70

## 2022-06-20 NOTE — DISCHARGE NOTE ADULT - MEDICATION SUMMARY - MEDICATIONS TO TAKE
I will START or STAY ON the medications listed below when I get home from the hospital:    simvastatin 10 mg oral tablet  -- 1 tab(s) by mouth once a day (at bedtime)  -- Indication: For SBO (small bowel obstruction)    carvedilol 6.25 mg oral tablet  -- 1 tab(s) by mouth 2 times a day  -- Indication: For SBO (small bowel obstruction)    miconazole 2% topical cream  -- 1 application on skin 2 times a day  -- Indication: For SBO (small bowel obstruction) Attending Attestation (For Attendings USE Only)...

## 2022-10-12 NOTE — H&P PST ADULT - FUNCTIONAL LEVEL PRIOR: TOILETING
Pt is out of facility at a follow up dr león for LLE during PT attempt to treat. Will check back on pt.    (0) independent

## 2023-10-12 NOTE — H&P PST ADULT - NS PRO OT REFERRAL QUES 1 YN
Diclofenac gel as directed 4 times daily as needed for foot pain.   Discontinue use if you do not have any benefit from this gel after 2 to 3 days    Try applying ice to the foot to see if this helps    Continue arch support in your shoes    Follow-up with podiatry next week for reevaluation if symptoms do not improve
no

## 2024-02-02 NOTE — DISCHARGE NOTE ADULT - LAUNCH MEDICATION RECONCILIATION
Called patient and she said she wanted to move her appt up, so appt rescheduled to 3/18/24. Patient still denies pain in lump on her left side, and is still concerned something may be wrong. Patient will discuss next steps at her appt, and whether further imaging in needed.   <<-----Click here for Discharge Medication Review

## 2024-04-14 NOTE — DISCHARGE NOTE ADULT - MEDICATION SUMMARY - MEDICATIONS TO STOP TAKING
I will STOP taking the medications listed below when I get home from the hospital:    Ceftin 250 mg oral tablet  -- 1 tab(s) by mouth 2 times a day MDD:2   -- Finish all this medication unless otherwise directed by prescriber.  Medication should be taken with plenty of water.  Take with food or milk. Wrist Watch

## 2024-05-22 NOTE — ED ADULT NURSE NOTE - BREATH SOUNDS, MLM
Arrived to the Infusion Center.  Bavencio completed. Patient tolerated without problems.   Any issues or concerns during appointment: no.  Patient aware of next infusion appointment on 6/5/24 (date) at 1015   Patient instructed to call provider with temperature of 100.4 or greater or nausea/vomiting/ diarrhea or pain not controlled by medications  Discharged ambulatory.     Clear

## 2024-12-04 NOTE — ED ADULT NURSE NOTE - NS ED NURSE LEVEL OF CONSCIOUSNESS ORIENTATION
Debra Denise is a 83 year old female that presents in clinic today for the following:     Chief Complaint   Patient presents with    Follow Up     1. Blood pressure medication  2. Pt reports dizziness and nausea  3. TIA      The patient's allergies and medications were reviewed. The patient's vitals were obtained without incident.     [place], [place]   Primary Care Clinic   Oriented - self; Oriented - place; Oriented - time

## 2025-01-10 NOTE — ED PROVIDER NOTE - PSH
H/O abdominal hysterectomy  removal of ovaries, tubes ,omentum,radical debulking  H/O skin graft  to abdominal wound (11/2016)  History of conization of cervix  1984  S/P cholecystectomy  2011  Status post Reymundo procedure  April 2016  Uterus disorder  removed in 2003, due to fibroids Assistance with ambulation/Assistance OOB with selected safe patient handling equipment/Communicate Risk of Fall with Harm to all staff/Monitor gait and stability/Reinforce activity limits and safety measures with patient and family/Sit up slowly, dangle for a short time, stand at bedside before walking/Tailored Fall Risk Interventions/Use of alarms - bed, chair and/or voice tab/Visual Cue: Yellow wristband and red socks/Bed in lowest position, wheels locked, appropriate side rails in place/Call bell, personal items and telephone in reach/Instruct patient to call for assistance before getting out of bed or chair/Non-slip footwear when patient is out of bed/[place] to call system/Physically safe environment - no spills, clutter or unnecessary equipment/Purposeful Proactive Rounding/Room/bathroom lighting operational, light cord in reach

## 2025-03-03 NOTE — ED PROVIDER NOTE - RELIEVING FACTORS
----- Message from Homa GLEASON sent at 3/3/2025 12:49 PM CST -----  Regarding: lab order  This patient has a lab appointment 03/07/2025. There are no orders available. Please place lab orders if needed. Thank you.   none